# Patient Record
Sex: MALE | Race: WHITE | Employment: FULL TIME | URBAN - METROPOLITAN AREA
[De-identification: names, ages, dates, MRNs, and addresses within clinical notes are randomized per-mention and may not be internally consistent; named-entity substitution may affect disease eponyms.]

---

## 2017-04-10 RX ORDER — LANOLIN ALCOHOL/MO/W.PET/CERES
1000 CREAM (GRAM) TOPICAL DAILY
COMMUNITY
End: 2019-09-23

## 2017-04-10 RX ORDER — BISMUTH SUBSALICYLATE 262 MG
1 TABLET,CHEWABLE ORAL DAILY
COMMUNITY
End: 2019-09-23

## 2017-04-10 RX ORDER — AMLODIPINE BESYLATE 2.5 MG/1
2.5 TABLET ORAL DAILY
COMMUNITY
End: 2019-09-23

## 2017-04-10 NOTE — PERIOP NOTES
[facility]  Ambulatory Surgery Unit  Pre-operative Instructions    Surgery/Procedure Date  04/12/2017            Tentative Arrival Time will call day       1. On the day of your surgery/procedure, please report to the Ambulatory Surgery Unit Registration Desk and sign in at your designated time. The Ambulatory Surgery Unit is located in [facility] on the [facility] side of the [facility] across from the [address] [place] [address]. Please have all of your health insurance cards and a photo ID. 2. You must have someone with you to drive you home, as you should not drive a car for 24 hours following anesthesia. Please make arrangements for a responsible adult friend or family member to stay with you for at least the first 24 hours after your surgery. 3. Do not have anything to eat or drink (including water, gum, mints, coffee, juice) after midnight   04/11/2017  . This may not apply to medications prescribed by your physician. (Please note below the special instructions with medications to take the morning of surgery, if applicable.)    4. We recommend you do not drink any alcoholic beverages for 24 hours before and after your surgery. 5. Stop all Aspirin and non-steroidal anti-inflammatory drugs (i.e. Advil, Aleve) as directed by your surgeon's office. Stop all vitamins and herbal supplements seven days prior to your surgery. If you are currently taking Plavix, Coumadin, or other blood-thinning agents, contact your surgeon for instructions. 6. In an effort to help prevent surgical site infection, we ask that you shower with an anti-bacterial soap (i.e. Dial or Safeguard) for 3 days prior to and on the morning of surgery, using a fresh towel after each shower. Do not apply any lotions, powders or deodorants after the shower on the day of your procedure. If applicable, please do not shave the operative site for 48 hours prior to surgery. 7. Wear comfortable clothes.   Wear glasses instead of contacts. Do not bring any money or jewelry. Do not wear make-up, particularly mascara, the morning of your surgery. Do not wear nail polish, particularly if you are having foot /hand surgery. Wear your hair loose or down, no ponytails, buns, dorothy pins or clips. All body piercings must be removed. 8. You should understand that if you do not follow these instructions your surgery may be cancelled. If your physical condition changes (i.e. fever, cold or flu) please contact your surgeon as soon as possible. 9. It is important that you be on time. If a situation occurs where you may be late, or if you have any questions or problems, please call [phone].    10. Your surgery time may be subject to change. You will receive a phone call the day prior to surgery to confirm your arrival time. 11. Pediatric patients: please bring a change of clothes, diapers, bottle/sippy cup, pacifier, etc.      Special Instructions:    MEDICATIONS TO TAKE THE MORNING OF SURGERY WITH A SIP OF WATER: Norvasc      I understand a pre-operative phone call will be made to verify my surgery time. In the event that I am not available, I give permission for a message to be left on my answering service and/or with another person? yes         ___________________      ___________________  _________  Pt verbalized understanding of preop instructions via telephone.   (Signature of Patient)          (Witness)                              (Date and Time)

## 2017-04-11 ENCOUNTER — ANESTHESIA EVENT (OUTPATIENT)
Dept: SURGERY | Age: 52
End: 2017-04-11
Payer: COMMERCIAL

## 2017-04-12 ENCOUNTER — ANESTHESIA (OUTPATIENT)
Dept: SURGERY | Age: 52
End: 2017-04-12
Payer: COMMERCIAL

## 2017-04-12 ENCOUNTER — HOSPITAL ENCOUNTER (OUTPATIENT)
Age: 52
Setting detail: OUTPATIENT SURGERY
Discharge: HOME | End: 2017-04-12
Attending: OTOLARYNGOLOGY | Admitting: OTOLARYNGOLOGY
Payer: COMMERCIAL

## 2017-04-12 ENCOUNTER — APPOINTMENT (OUTPATIENT)
Dept: GENERAL RADIOLOGY | Age: 52
End: 2017-04-12
Attending: ANESTHESIOLOGY
Payer: COMMERCIAL

## 2017-04-12 VITALS
BODY MASS INDEX: 24.87 KG/M2 | OXYGEN SATURATION: 92 % | RESPIRATION RATE: 11 BRPM | WEIGHT: 200 LBS | TEMPERATURE: 98.3 F | HEIGHT: 75 IN | SYSTOLIC BLOOD PRESSURE: 135 MMHG | HEART RATE: 99 BPM | DIASTOLIC BLOOD PRESSURE: 85 MMHG

## 2017-04-12 PROCEDURE — 77030013079 HC BLNKT BAIR HGGR 3M -A: Performed by: ANESTHESIOLOGY

## 2017-04-12 PROCEDURE — 77030012602 HC SPNG PTTY NEUR J&J -B: Performed by: OTOLARYNGOLOGY

## 2017-04-12 PROCEDURE — 77030006689 HC BLD OPHTH BVR BD -A: Performed by: OTOLARYNGOLOGY

## 2017-04-12 PROCEDURE — 74011000250 HC RX REV CODE- 250: Performed by: OTOLARYNGOLOGY

## 2017-04-12 PROCEDURE — 77030008356 HC SPLNT NSL INT MEDT -B: Performed by: OTOLARYNGOLOGY

## 2017-04-12 PROCEDURE — 76210000050 HC AMBSU PH II REC 0.5 TO 1 HR: Performed by: OTOLARYNGOLOGY

## 2017-04-12 PROCEDURE — 77030002916 HC SUT ETHLN J&J -A: Performed by: OTOLARYNGOLOGY

## 2017-04-12 PROCEDURE — 74011250636 HC RX REV CODE- 250/636

## 2017-04-12 PROCEDURE — 74011250637 HC RX REV CODE- 250/637: Performed by: OTOLARYNGOLOGY

## 2017-04-12 PROCEDURE — 77030014006 HC SPNG HEMSTAT J&J -A: Performed by: OTOLARYNGOLOGY

## 2017-04-12 PROCEDURE — 77030020255 HC SOL INJ LR 1000ML BG: Performed by: OTOLARYNGOLOGY

## 2017-04-12 PROCEDURE — 74011250636 HC RX REV CODE- 250/636: Performed by: OTOLARYNGOLOGY

## 2017-04-12 PROCEDURE — 74011250636 HC RX REV CODE- 250/636: Performed by: ANESTHESIOLOGY

## 2017-04-12 PROCEDURE — 77030002974 HC SUT PLN J&J -A: Performed by: OTOLARYNGOLOGY

## 2017-04-12 PROCEDURE — 76210000036 HC AMBSU PH I REC 1.5 TO 2 HR: Performed by: OTOLARYNGOLOGY

## 2017-04-12 PROCEDURE — 76030000001 HC AMB SURG OR TIME 1 TO 1.5: Performed by: OTOLARYNGOLOGY

## 2017-04-12 PROCEDURE — 77030002888 HC SUT CHRMC J&J -A: Performed by: OTOLARYNGOLOGY

## 2017-04-12 PROCEDURE — 74011000250 HC RX REV CODE- 250

## 2017-04-12 PROCEDURE — 77030026438 HC STYL ET INTUB CARD -A: Performed by: ANESTHESIOLOGY

## 2017-04-12 PROCEDURE — 74011000272 HC RX REV CODE- 272: Performed by: OTOLARYNGOLOGY

## 2017-04-12 PROCEDURE — 74011000250 HC RX REV CODE- 250: Performed by: ANESTHESIOLOGY

## 2017-04-12 PROCEDURE — 77030006907 HC BLD SNUS SHV MEDT -C: Performed by: OTOLARYNGOLOGY

## 2017-04-12 PROCEDURE — 77030018836 HC SOL IRR NACL ICUM -A: Performed by: OTOLARYNGOLOGY

## 2017-04-12 PROCEDURE — 71010 XR CHEST PORT: CPT

## 2017-04-12 PROCEDURE — 77030021668 HC NEB PREFIL KT VYRM -A: Performed by: OTOLARYNGOLOGY

## 2017-04-12 PROCEDURE — 76060000062 HC AMB SURG ANES 1 TO 1.5 HR: Performed by: OTOLARYNGOLOGY

## 2017-04-12 PROCEDURE — 77030008684 HC TU ET CUF COVD -B: Performed by: ANESTHESIOLOGY

## 2017-04-12 RX ORDER — ROCURONIUM BROMIDE 10 MG/ML
INJECTION, SOLUTION INTRAVENOUS AS NEEDED
Status: DISCONTINUED | OUTPATIENT
Start: 2017-04-12 | End: 2017-04-12 | Stop reason: HOSPADM

## 2017-04-12 RX ORDER — SODIUM CHLORIDE, SODIUM LACTATE, POTASSIUM CHLORIDE, CALCIUM CHLORIDE 600; 310; 30; 20 MG/100ML; MG/100ML; MG/100ML; MG/100ML
INJECTION, SOLUTION INTRAVENOUS
Status: DISCONTINUED | OUTPATIENT
Start: 2017-04-12 | End: 2017-04-12 | Stop reason: HOSPADM

## 2017-04-12 RX ORDER — CLINDAMYCIN PHOSPHATE 900 MG/50ML
900 INJECTION INTRAVENOUS ONCE
Status: COMPLETED | OUTPATIENT
Start: 2017-04-12 | End: 2017-04-12

## 2017-04-12 RX ORDER — BACITRACIN ZINC 500 UNIT/G
OINTMENT (GRAM) TOPICAL ONCE
Status: COMPLETED | OUTPATIENT
Start: 2017-04-12 | End: 2017-04-12

## 2017-04-12 RX ORDER — ONDANSETRON 2 MG/ML
INJECTION INTRAMUSCULAR; INTRAVENOUS AS NEEDED
Status: DISCONTINUED | OUTPATIENT
Start: 2017-04-12 | End: 2017-04-12 | Stop reason: HOSPADM

## 2017-04-12 RX ORDER — GLYCOPYRROLATE 0.2 MG/ML
INJECTION INTRAMUSCULAR; INTRAVENOUS AS NEEDED
Status: DISCONTINUED | OUTPATIENT
Start: 2017-04-12 | End: 2017-04-12 | Stop reason: HOSPADM

## 2017-04-12 RX ORDER — FENTANYL CITRATE 50 UG/ML
25 INJECTION, SOLUTION INTRAMUSCULAR; INTRAVENOUS
Status: DISCONTINUED | OUTPATIENT
Start: 2017-04-12 | End: 2017-04-12 | Stop reason: HOSPADM

## 2017-04-12 RX ORDER — DEXAMETHASONE SODIUM PHOSPHATE 4 MG/ML
INJECTION, SOLUTION INTRA-ARTICULAR; INTRALESIONAL; INTRAMUSCULAR; INTRAVENOUS; SOFT TISSUE AS NEEDED
Status: DISCONTINUED | OUTPATIENT
Start: 2017-04-12 | End: 2017-04-12 | Stop reason: HOSPADM

## 2017-04-12 RX ORDER — MORPHINE SULFATE 10 MG/ML
2 INJECTION, SOLUTION INTRAMUSCULAR; INTRAVENOUS
Status: DISCONTINUED | OUTPATIENT
Start: 2017-04-12 | End: 2017-04-12 | Stop reason: HOSPADM

## 2017-04-12 RX ORDER — NEOSTIGMINE METHYLSULFATE 1 MG/ML
INJECTION INTRAVENOUS AS NEEDED
Status: DISCONTINUED | OUTPATIENT
Start: 2017-04-12 | End: 2017-04-12 | Stop reason: HOSPADM

## 2017-04-12 RX ORDER — DEXAMETHASONE SODIUM PHOSPHATE 4 MG/ML
10 INJECTION, SOLUTION INTRA-ARTICULAR; INTRALESIONAL; INTRAMUSCULAR; INTRAVENOUS; SOFT TISSUE EVERY 4 HOURS
Status: DISCONTINUED | OUTPATIENT
Start: 2017-04-12 | End: 2017-04-12 | Stop reason: HOSPADM

## 2017-04-12 RX ORDER — SODIUM CHLORIDE 0.9 % (FLUSH) 0.9 %
5-10 SYRINGE (ML) INJECTION EVERY 8 HOURS
Status: DISCONTINUED | OUTPATIENT
Start: 2017-04-12 | End: 2017-04-12 | Stop reason: HOSPADM

## 2017-04-12 RX ORDER — ALBUTEROL SULFATE 0.83 MG/ML
SOLUTION RESPIRATORY (INHALATION)
Status: COMPLETED
Start: 2017-04-12 | End: 2017-04-12

## 2017-04-12 RX ORDER — SODIUM CHLORIDE 0.9 % (FLUSH) 0.9 %
5-10 SYRINGE (ML) INJECTION AS NEEDED
Status: DISCONTINUED | OUTPATIENT
Start: 2017-04-12 | End: 2017-04-12 | Stop reason: HOSPADM

## 2017-04-12 RX ORDER — SODIUM CHLORIDE, SODIUM LACTATE, POTASSIUM CHLORIDE, CALCIUM CHLORIDE 600; 310; 30; 20 MG/100ML; MG/100ML; MG/100ML; MG/100ML
25 INJECTION, SOLUTION INTRAVENOUS CONTINUOUS
Status: DISCONTINUED | OUTPATIENT
Start: 2017-04-12 | End: 2017-04-12 | Stop reason: HOSPADM

## 2017-04-12 RX ORDER — OXYCODONE AND ACETAMINOPHEN 5; 325 MG/1; MG/1
1 TABLET ORAL ONCE
Status: DISCONTINUED | OUTPATIENT
Start: 2017-04-12 | End: 2017-04-12 | Stop reason: HOSPADM

## 2017-04-12 RX ORDER — HYDROMORPHONE HYDROCHLORIDE 2 MG/ML
INJECTION, SOLUTION INTRAMUSCULAR; INTRAVENOUS; SUBCUTANEOUS AS NEEDED
Status: DISCONTINUED | OUTPATIENT
Start: 2017-04-12 | End: 2017-04-12 | Stop reason: HOSPADM

## 2017-04-12 RX ORDER — DIPHENHYDRAMINE HYDROCHLORIDE 50 MG/ML
12.5 INJECTION, SOLUTION INTRAMUSCULAR; INTRAVENOUS AS NEEDED
Status: DISCONTINUED | OUTPATIENT
Start: 2017-04-12 | End: 2017-04-12 | Stop reason: HOSPADM

## 2017-04-12 RX ORDER — OXYMETAZOLINE HCL 0.05 %
3 SPRAY, NON-AEROSOL (ML) NASAL ONCE
Status: COMPLETED | OUTPATIENT
Start: 2017-04-12 | End: 2017-04-12

## 2017-04-12 RX ORDER — ALBUTEROL SULFATE 0.83 MG/ML
2.5 SOLUTION RESPIRATORY (INHALATION)
Status: COMPLETED | OUTPATIENT
Start: 2017-04-12 | End: 2017-04-12

## 2017-04-12 RX ORDER — PROPOFOL 10 MG/ML
INJECTION, EMULSION INTRAVENOUS AS NEEDED
Status: DISCONTINUED | OUTPATIENT
Start: 2017-04-12 | End: 2017-04-12 | Stop reason: HOSPADM

## 2017-04-12 RX ORDER — HYDROMORPHONE HYDROCHLORIDE 1 MG/ML
.2-.5 INJECTION, SOLUTION INTRAMUSCULAR; INTRAVENOUS; SUBCUTANEOUS ONCE
Status: DISCONTINUED | OUTPATIENT
Start: 2017-04-12 | End: 2017-04-12 | Stop reason: HOSPADM

## 2017-04-12 RX ORDER — OXYMETAZOLINE HCL 0.05 %
2 SPRAY, NON-AEROSOL (ML) NASAL ONCE
Status: COMPLETED | OUTPATIENT
Start: 2017-04-12 | End: 2017-04-12

## 2017-04-12 RX ORDER — MIDAZOLAM HYDROCHLORIDE 1 MG/ML
INJECTION, SOLUTION INTRAMUSCULAR; INTRAVENOUS AS NEEDED
Status: DISCONTINUED | OUTPATIENT
Start: 2017-04-12 | End: 2017-04-12

## 2017-04-12 RX ORDER — LIDOCAINE HYDROCHLORIDE 10 MG/ML
0.1 INJECTION, SOLUTION EPIDURAL; INFILTRATION; INTRACAUDAL; PERINEURAL AS NEEDED
Status: DISCONTINUED | OUTPATIENT
Start: 2017-04-12 | End: 2017-04-12 | Stop reason: HOSPADM

## 2017-04-12 RX ORDER — FENTANYL CITRATE 50 UG/ML
INJECTION, SOLUTION INTRAMUSCULAR; INTRAVENOUS AS NEEDED
Status: DISCONTINUED | OUTPATIENT
Start: 2017-04-12 | End: 2017-04-12 | Stop reason: HOSPADM

## 2017-04-12 RX ORDER — MIDAZOLAM HYDROCHLORIDE 1 MG/ML
INJECTION, SOLUTION INTRAMUSCULAR; INTRAVENOUS AS NEEDED
Status: DISCONTINUED | OUTPATIENT
Start: 2017-04-12 | End: 2017-04-12 | Stop reason: HOSPADM

## 2017-04-12 RX ADMIN — OXYMETAZOLINE HYDROCHLORIDE 3 SPRAY: 5 SPRAY NASAL at 09:43

## 2017-04-12 RX ADMIN — HYDROMORPHONE HYDROCHLORIDE 1 MG: 2 INJECTION, SOLUTION INTRAMUSCULAR; INTRAVENOUS; SUBCUTANEOUS at 11:12

## 2017-04-12 RX ADMIN — FENTANYL CITRATE 100 MCG: 50 INJECTION, SOLUTION INTRAMUSCULAR; INTRAVENOUS at 11:03

## 2017-04-12 RX ADMIN — NEOSTIGMINE METHYLSULFATE 3 MG: 1 INJECTION INTRAVENOUS at 11:59

## 2017-04-12 RX ADMIN — DEXAMETHASONE SODIUM PHOSPHATE 4 MG: 4 INJECTION, SOLUTION INTRA-ARTICULAR; INTRALESIONAL; INTRAMUSCULAR; INTRAVENOUS; SOFT TISSUE at 11:15

## 2017-04-12 RX ADMIN — PROPOFOL 100 MG: 10 INJECTION, EMULSION INTRAVENOUS at 11:35

## 2017-04-12 RX ADMIN — ALBUTEROL SULFATE 2.5 MG: 2.5 SOLUTION RESPIRATORY (INHALATION) at 13:04

## 2017-04-12 RX ADMIN — ONDANSETRON 4 MG: 2 INJECTION INTRAMUSCULAR; INTRAVENOUS at 11:15

## 2017-04-12 RX ADMIN — SODIUM CHLORIDE, SODIUM LACTATE, POTASSIUM CHLORIDE, AND CALCIUM CHLORIDE 25 ML/HR: 600; 310; 30; 20 INJECTION, SOLUTION INTRAVENOUS at 09:39

## 2017-04-12 RX ADMIN — ROCURONIUM BROMIDE 50 MG: 10 INJECTION, SOLUTION INTRAVENOUS at 11:03

## 2017-04-12 RX ADMIN — LIDOCAINE HYDROCHLORIDE 0.1 ML: 10 INJECTION, SOLUTION EPIDURAL; INFILTRATION; INTRACAUDAL; PERINEURAL at 09:39

## 2017-04-12 RX ADMIN — DEXAMETHASONE SODIUM PHOSPHATE 10 MG: 4 INJECTION, SOLUTION INTRAMUSCULAR; INTRAVENOUS at 09:45

## 2017-04-12 RX ADMIN — SODIUM CHLORIDE, SODIUM LACTATE, POTASSIUM CHLORIDE, CALCIUM CHLORIDE: 600; 310; 30; 20 INJECTION, SOLUTION INTRAVENOUS at 11:10

## 2017-04-12 RX ADMIN — GLYCOPYRROLATE 0.6 MG: 0.2 INJECTION INTRAMUSCULAR; INTRAVENOUS at 11:57

## 2017-04-12 RX ADMIN — PROPOFOL 200 MG: 10 INJECTION, EMULSION INTRAVENOUS at 11:03

## 2017-04-12 RX ADMIN — MIDAZOLAM HYDROCHLORIDE 2 MG: 1 INJECTION, SOLUTION INTRAMUSCULAR; INTRAVENOUS at 10:53

## 2017-04-12 RX ADMIN — ALBUTEROL SULFATE 2.5 MG: 0.83 SOLUTION RESPIRATORY (INHALATION) at 13:04

## 2017-04-12 RX ADMIN — HYDROMORPHONE HYDROCHLORIDE 1 MG: 2 INJECTION, SOLUTION INTRAMUSCULAR; INTRAVENOUS; SUBCUTANEOUS at 11:37

## 2017-04-12 RX ADMIN — CLINDAMYCIN PHOSPHATE 900 MG: 18 INJECTION, SOLUTION INTRAVENOUS at 11:10

## 2017-04-12 NOTE — ANESTHESIA POSTPROCEDURE EVALUATION
Post-Anesthesia Evaluation and Assessment    Patient: Zara Miranda MRN: [id number]  SSN: [id number]    YOB: 1965  Age: 45 y.o. Sex: male       Cardiovascular Function/Vital Signs  Visit Vitals    /84    Pulse 93    Temp 36.8 °C (98.3 °F)    Resp 12    Ht 6' 3\" (1.905 m)    Wt 90.7 kg (200 lb)    SpO2 (!) 89%    BMI 25 kg/m2       Patient is status post general anesthesia for Procedure(s):  SEPTOPLASTY, TURBINATE REDUCTION. Nausea/Vomiting: None    Postoperative hydration reviewed and adequate. Pain:  Pain Scale 1: Numeric (0 - 10) (04/12/17 1245)  Pain Intensity 1: 0 (04/12/17 1245)   Managed    Neurological Status:   Neuro (WDL): Exceptions to WDL (04/12/17 1213)  Neuro  Neurologic State: Alert (04/12/17 1217)  LUE Motor Response: Purposeful (04/12/17 1213)  LLE Motor Response: Purposeful (04/12/17 1213)  RUE Motor Response: Purposeful (04/12/17 1213)  RLE Motor Response: Purposeful (04/12/17 1213)   At baseline    Mental Status and Level of Consciousness: Arousable    Pulmonary Status:   O2 Device: 02 face tent (04/12/17 1213)   Adequate oxygenation and airway patent    Complications related to anesthesia: None    Post-anesthesia assessment completed.  No concerns    Signed By: Opal Garcia MD     April 12, 2017

## 2017-04-12 NOTE — DISCHARGE INSTRUCTIONS
[facility], [facility]. Rupesh Jimenez M.D., Ph.D., F.A.C.S. [address], [address] [facility]  [phone]    POST OPERATIVE INSTRUCTIONS-SEPTOPLASTY    The following instructions are designed to help you recover from surgery as easily as possible. Taking care of yourself can prevent complications. It is very important that you read this sheet often and follow the instructions carefully while you are at home. Your doctor or nurse will be happy to answer any questions. 1. DIET:  You may resume your normal diet in 24 hours. We suggest nothing heavy or greasy and avoid dairy products the first 24 hours. It is important to remember that good overall diet and health promotes healing. 2.  ACTIVITY RESTRICTIONS:  The following guidelines should be followed until your doctor tells you otherwise:   Do not blow your nose, sniff and if you have to sneeze or cough-do with mouth open   Do not lift anything over five pounds.  Do not bend over. Avoid doing things like tying your shoes or picking up things off the floor.  Do not do heavy exercise or play contact sports   Do not strain for a bowel movement. If you are constipated, take a stool softener or a gentle laxative.  Go back to work or school in one week. 3.  HOW TO TAKE CARE OF YOUR NOSE AND SINUSES:  Take the antibiotic prescribed by your doctor. Call if you have any problems or questions. We expect bloody drainage from your nose. You are to change the dressing below your nose as needed and expect to do this 10 to 12 times the first day. We want the drainage to come forward and not down your throat. We suggest you rest and sleep elevated on several pillows on your side. You will have less drainage each day and the swelling will reduce in several days. Call the office if you have bloody saturated gauze more than once an hour.     Clean the nasal openings twice daily with a Q-tip soaked in hydrogen peroxide until clear of all crusts. Take the pain medication prescribed by your doctor as needed for discomfort. No Aspirin, Motrin, Alleve, Advil or similar products for 3 weeks. You make take Tylenol (Acetaminophen) when you no longer need prescribed medication. Do not take Tylenol on top of pain medication because Tylenol is in the pain medication. Avoid smoke, dust, fumes or anything else that might irritate the nose. Protect yourself from any unexpected injury to your nose or face, such as being hit by small children or a restless bedmate. You may find that a cool mist room humidifier is helpful in decreasing the amount of dryness in your nose and mouth. After surgery you should use a nasal saline spray such as Ayr or Simply Saline. Use 4 sprays in each nostril every two hours while awake to help prevent crusts from forming in your nose. 4. RETURN APPOINTMENT:   You need to make a follow-up appointment with your doctor in 5 days. At this time your nose will be gently and carefully examined and your packing will be removed. 5. NOTIFY YOUR DOCTOR IF YOU HAVE:    A sudden increase in the amount of bleeding from the nose   A fever above 101 degrees F, which persists despite increasing the amount of fluids you take. A person with a fever should try to drink one cup of water each waking hour.  Persistent sharp pain that is not relieved by the pain medication you receive.  Increased swelling or redness of your nose. If you have any questions or concerns following your surgery do not hesitate to contact our office at [phone]     DO  [facility], [address] [facility] TAKE NARCOTIC PAIN MEDICATIONS WITH FOOD   Narcotics tend to be constipating, we suggest taking a stool softener such as Colace or Miralax (follow package instructions). DO NOT DRIVE WHILE TAKING NARCOTIC PAIN MEDICATIONS.     DO NOT TAKE SLEEPING MEDICATIONS OR ANTIANXIETY MEDICATIONS WHILE TAKING NARCOTIC PAIN MEDICATIONS,  ESPECIALLY THE NIGHT OF ANESTHESIA. CPAP PATIENTS BE SURE TO WEAR MACHINE WHENEVER NAPPING OR SLEEPING. DISCHARGE SUMMARY from Nurse    The following personal items collected during your admission are returned to you:   Dental Appliance: Dental Appliances: None  Vision: Visual Aid: Glasses  Hearing Aid:    Jewelry: Jewelry: None  Clothing: Clothing: Other (comment) (belonging bag)  Other Valuables: Other Valuables: None  Valuables sent to safe:        PATIENT INSTRUCTIONS:    After General Anesthesia or Intravenous Sedation, for 24 hours or while taking prescription Narcotics:        Someone should be with you for the next 24 hours. For your own safety, a responsible adult must drive you home. · Limit your activities  · Recommended activity: Rest today, up with assistance today. Do not climb stairs or shower unattended for the next 24 hours. · Please start with a soft bland diet and advance as tolerated (no nausea) to regular diet. · If you have a sore throat you should try the following: fluids, warm salt water gargles, or throat lozenges. If it does not improve after several days please follow up with your primary physician. · Do not drive and operate hazardous machinery  · Do not make important personal or business decisions  · Do  not drink alcoholic beverages  · If you have not urinated within 8 hours after discharge, please contact your surgeon on call.     Report the following to your surgeon:  · Excessive pain, swelling, redness or odor of or around the surgical area  · Temperature over 100.5  · Nausea and vomiting lasting longer than 4 hours or if unable to take medications  · Any signs of decreased circulation or nerve impairment to extremity: change in color, persistent  numbness, tingling, coldness or increase pain      · You will receive a Post Operative Call from one of the Recovery Room Nurses on the day after your surgery to check on you. It is very important for us to know how you are recovering after your surgery. If you have an issue or need to speak with someone, please call your surgeon, do not wait for the post operative call. · You may receive an e-mail or letter in the mail from [facility] regarding your experience with us in the Ambulatory Surgery Unit. Your feedback is valuable to us and we appreciate your participation in the survey. · If the above instructions are not adequate, please contact Mavis Charlton RN, [facility] anesthesia Nurse Manager or our Anesthesiologist, at [phone]. If you are having problems after your surgery, call the physician at his office number. · We wish you a speedy recovery ? What to do at Home:      *  Please give a list of your current medications to your Primary Care Provider. *  Please update this list whenever your medications are discontinued, doses are      changed, or new medications (including over-the-counter products) are added. *  Please carry medication information at all times in case of emergency situations. These are general instructions for a healthy lifestyle:    No smoking/ No tobacco products/ Avoid exposure to second hand smoke    Surgeon General's Warning:  Quitting smoking now greatly reduces serious risk to your health. Obesity, smoking, and sedentary lifestyle greatly increases your risk for illness    A healthy diet, regular physical exercise & weight monitoring are important for maintaining a healthy lifestyle    You may be retaining fluid if you have a history of heart failure or if you experience any of the following symptoms:  Weight gain of 3 pounds or more overnight or 5 pounds in a week, increased swelling in our hands or feet or shortness of breath while lying flat in bed.   Please call your doctor as soon as you notice any of these symptoms; do not wait until your next office visit. Recognize signs and symptoms of STROKE:    F-face looks uneven    A-arms unable to move or move even    S-speech slurred or non-existent    T-time-call 911 as soon as signs and symptoms begin-DO NOT go       Back to bed or wait to see if you get better-TIME IS BRAIN. If you have not received your influenza and/or pneumococcal vaccine, please follow up with your primary care physician. The discharge information has been reviewed with the patient and caregiver. The patient and caregiver verbalized understanding.

## 2017-04-12 NOTE — OP NOTES
[address], [address]   OP NOTE       Name:  Deena Mcclendon   MR#:  [id number]   :  1965   Account #:  [de-identified]    Surgery Date:  2017   Date of Adm:  2017       PREOPERATIVE DIAGNOSES:   1. Nasal septal deviation. 2. Turbinate hypertrophy. POSTOPERATIVE DIAGNOSES:   1. Nasal septal deviation. 2. Turbinate hypertrophy. PROCEDURES PERFORMED:   1. Septoplasty. 2. Bilateral inferior turbinate submucosal resection with therapeutic   outfracture. SURGEON: Linda Azar. Yvonne Browning MD    ANESTHESIA: General endotracheal tube anesthesia. ESTIMATED BLOOD LOSS:  35 mL. COMPLICATIONS: No surgical complications observed. SPECIMENS REMOVED:  None. INDICATIONS: The patient is a 58-year-old male with a long history of   chronic nasal airway obstruction which has been refractory to medical   therapy. A preoperative nasal endoscopy confirmed the presence of a   markedly deviated and obstructive septal deflection with hypertrophy of   the inferior turbinates. Preoperatively, the alternatives, potential   benefits, and possible risks of the procedure were explained to the   patient, who understood and requested to proceed. DESCRIPTION OF PROCEDURE: The patient was brought to the   operating room, placed supine on the operating table, and following the   smooth induction of general endotracheal tube anesthesia, the table   was turned 90 degrees and the patient was positioned for operation. Xylocaine 1% with 1:100,000 epinephrine was injected in a   submucoperichondrial and submucoperiosteal fashion from anterior to   posterior. Further oxymetazoline on neurosurgical cottonoids was   placed intranasally. A routine Betadine prep and drape was completed. A [address] [place] blade was used to initiate a left hemitransfixion incision.    The submucoperichondrial and submucoperiosteal elevation of   membranes was carried out from anterior to posterior. The   osteocartilaginous junction was identified and  with a caudal   calibrated elevator. The deformed and obstructing portions of posterior   osseous septum were removed with a Justo-Barker bone punch. A spur arising from the maxillary crest was removed with a 4 mm   osteotome. A 2 mm strip of anterosuperior quadrangular cartilage was   removed to allow a swinging door return to midline of the planar   portions of quadrangular cartilage. Satisfactory reduction of the nasal   septal constituents was observed. Attention was turned to turbinate   reduction. Using the StraightShot handpiece with a 2 mm turbinate blade   attached, a submucosal resection of the right and left inferior turbinate   soft tissues was carried out. After adequate soft tissue had been   resected, a Boies elevator was used to perform a therapeutic   outfracture of the inferior turbinate on each side. A satisfactory nasal   airway was subsequently observed and attention was turned to   closure. Interrupted 4-0 chromic was used to approximate the margins of the   left hemitransfixion incision. A running 4-0 plain gut suture was used to   approximate septal membranes to the midline. Douglass ventilated splints   were placed and secured with a nylon mattress suture. Bacitracin-  coated Gelfoam was placed on each side of the nasal cavity to further   support the inferior turbinates in a lateralized position. With application   of a sterile nasal dressing, the patient's procedure was concluded. The   patient was aroused from general anesthesia, extubated in the   operating room, and transferred to the recovery area in satisfactory   condition.         Bianka Sims MD      [address] [place] [address] / GRIC[place]   D:  04/12/2017   12:13   T:  04/12/2017   12:50   Job #:  [id number]

## 2017-04-12 NOTE — BRIEF OP NOTE
BRIEF OPERATIVE NOTE    Date of Procedure: 4/12/2017   Preoperative Diagnosis: DEVIATED SEPTUM, TURBINATE HYPERTROPHY  Postoperative Diagnosis: DEVIATED SEPTUM, TURBINATE HYPERTROPHY    Procedure(s):  SEPTOPLASTY, TURBINATE REDUCTION  Surgeon(s) and Role:     * Gonzalo Tompkins MD - Primary            Surgical Staff:  Circ-1: Gerald Cole  Circ-Relief: Miguel Regalado RN  Scrub Tech-1: Fern Michael  Scrub Tech-Relief: Jamaal Sierra  Event Time In   Incision Start 1121   Incision Close 1200     Anesthesia: General   Estimated Blood Loss: 25 ml  Specimens: * No specimens in log *   Findings: as expected   Complications: no surgical complications  Implants: * No implants in log *

## 2017-04-12 NOTE — ANESTHESIA PREPROCEDURE EVALUATION
Anesthetic History   No history of anesthetic complications            Review of Systems / Medical History  Patient summary reviewed, nursing notes reviewed and pertinent labs reviewed    Pulmonary          Smoker (1/2 ppd, none x 1 week)      Comments: Smoker  Deviated Septum  Turbinate Hypertrophy  Pulmonary Sarcoidosis   Neuro/Psych   Within defined limits           Cardiovascular    Hypertension              Exercise tolerance: >4 METS     GI/Hepatic/Renal  Within defined limits              Endo/Other             Other Findings   Comments: Gout  Alcohol - 1/2 gallon bourbon per week    sacoidosis of the lungs; asymptomatic           Physical Exam    Airway  Mallampati: II  TM Distance: 4 - 6 cm  Neck ROM: normal range of motion   Mouth opening: Normal     Cardiovascular  Regular rate and rhythm,  S1 and S2 normal,  no murmur, click, rub, or gallop  Rhythm: regular  Rate: normal      Pertinent negatives: No murmur   Dental      Comments: Cracked upper left molar   Pulmonary  Breath sounds clear to auscultation               Abdominal  GI exam deferred       Other Findings            Anesthetic Plan    ASA: 2  Anesthesia type: general    Monitoring Plan: BIS      Induction: Intravenous  Anesthetic plan and risks discussed with: Patient

## 2017-04-12 NOTE — PERIOP NOTES
June Garcia  1965  [id number]    Situation:  Verbal report given from: LARRY Macdonald RN and JOELLEN [place] CRNA  Procedure: Procedure(s):  SEPTOPLASTY, TURBINATE REDUCTION    Background:    Preoperative diagnosis: DEVIATED SEPTUM, TURBINATE HYPERTROPHY    Postoperative diagnosis: DEVIATED SEPTUM, TURBINATE HYPERTROPHY    :  Dr. Zach Eden    Assistant(s): Circ-1: Haylee Liu  Circ-Relief: Paige Howell RN  Scrub Tech-1: Fern Michael  Scrub Tech-Relief: Apryl Marlow    Specimens: * No specimens in log *    Assessment:  Intra-procedure medications         Anesthesia gave intra-procedure sedation and medications, see anesthesia flow sheet     Intravenous fluids: LR@ KVO     Vital signs stable       Recommendation:    Permission to share finding with family or friend yes    [phone] Pt continues with humidified face shield, encoured cough/deep breathing. Able to bring sats up to 95-96 % but unable to sustain, sats range from 88-91%  1304 Albuterol nebulizer treatment given per Dr. Jerry Hidalgo order. Pt using incentive spirometer prior to this and o2 sats still remaining high 80's, low 90's. 1315  to bedside, ordered stat CXR.  notified of pt's oxygen sat level and that CXR ordered. Report given to KARIME Pulliam RN to assume care of pt. Pt alert/awake, denies discomfort/nausea. 1740 [place] done

## 2017-04-12 NOTE — PERIOP NOTES
18  Dr. Trey Dinero updated by JACQUELINE Ladd,RN about low oxygen saturation and plan for cxr per Dr. Feliciano Ngo. Dr. Trey Dinero request to be notified about cxr results. [id number] CXR done. On 100% face mask. Oxygen saturation 98%. Placed on blow by at 70%    1416 CXR results back. Results shown to Dr. Feliciano Ngo and called to Dr. Trey Dinero. Oxygen saturation low at 92%,. Lungs remain clear. Dr. Trey Dinero and Dr. Feliciano Nog states ok for pt to be d/charley home.

## 2017-04-12 NOTE — IP AVS SNAPSHOT
[address] [facility] 
[phone] Patient: Haydee Carranza MRN: [id number] :1965 You are allergic to the following Allergen Reactions Contrast Agent (Iodine) Itching Penicillin G Hives Recent Documentation Height Weight BMI Smoking Status 1.905 m 90.7 kg 25 kg/m2 Light Tobacco Smoker Emergency Contacts Name Discharge Info Relation Home Work Mobile [address] [facility] CAREGIVER [3] Other Relative [6] [phone] Edrie Jamiljenn     [phone] About your hospitalization You were admitted on:  2017 You last received care in the:  [facility] PACU You were discharged on:  2017 Unit phone number:  [phone] Why you were hospitalized Your primary diagnosis was:  Not on File Providers Seen During Your Hospitalizations Provider Role Specialty Primary office phone Rigo Marie MD Attending Provider Otolaryngology [phone] Your Primary Care Physician (PCP) Primary Care Physician Office Phone Office Fax Arely Glory [phone] Follow-up Information Follow up With Details Comments Contact Info MD Sonal Small [address] [facility] 
[phone] Current Discharge Medication List  
  
CONTINUE these medications which have NOT CHANGED Dose & Instructions Dispensing Information Comments Morning Noon Evening Bedtime  
 cyanocobalamin 1,000 mcg tablet Your last dose was: Your next dose is:    
   
   
 Dose:  1000 mcg Take 1,000 mcg by mouth daily. Indications: PREVENTION OF VITAMIN B12 DEFICIENCY Refills:  0  
     
   
   
   
  
 multivitamin tablet Commonly known as:  ONE A DAY Your last dose was: Your next dose is:    
   
   
 Dose:  1 Tab Take 1 Tab by mouth daily. Indications: VITAMIN DEFICIENCY PREVENTION Refills:  0 NORVASC 2.5 mg tablet Generic drug:  amLODIPine Your last dose was: Your next dose is:    
   
   
 Dose:  2.5 mg Take 2.5 mg by mouth daily. Indications: hypertension Refills:  0 Discharge Instructions [facility]. Lito Hernández M.D., Ph.D., F.A.C.S. Otolaryngology 95  [facility] [address], [address] [facility] 
[phone] POST OPERATIVE INSTRUCTIONS-SEPTOPLASTY The following instructions are designed to help you recover from surgery as easily as possible. Taking care of yourself can prevent complications. It is very important that you read this sheet often and follow the instructions carefully while you are at home. Your doctor or nurse will be happy to answer any questions. 1. DIET: 
You may resume your normal diet in 24 hours. We suggest nothing heavy or greasy and avoid dairy products the first 24 hours. It is important to remember that good overall diet and health promotes healing. 2.  ACTIVITY RESTRICTIONS: 
The following guidelines should be followed until your doctor tells you otherwise: ? Do not blow your nose, sniff and if you have to sneeze or cough-do with mouth open ? Do not lift anything over five pounds. ? Do not bend over. Avoid doing things like tying your shoes or picking up things off the floor. ? Do not do heavy exercise or play contact sports ? Do not strain for a bowel movement. If you are constipated, take a stool softener or a gentle laxative. ? Go back to work or school in one week. 3.  HOW TO TAKE CARE OF YOUR NOSE AND SINUSES: 
Take the antibiotic prescribed by your doctor. Call if you have any problems or questions. We expect bloody drainage from your nose.   You are to change the dressing below your nose as needed and expect to do this 10 to 12 times the first day. We want the drainage to come forward and not down your throat. We suggest you rest and sleep elevated on several pillows on your side. You will have less drainage each day and the swelling will reduce in several days. Call the office if you have bloody saturated gauze more than once an hour. Clean the nasal openings twice daily with a Q-tip soaked in hydrogen peroxide until clear of all crusts. Take the pain medication prescribed by your doctor as needed for discomfort. No Aspirin, Motrin, Alleve, Advil or similar products for 3 weeks. You make take Tylenol (Acetaminophen) when you no longer need prescribed medication. Do not take Tylenol on top of pain medication because Tylenol is in the pain medication. Avoid smoke, dust, fumes or anything else that might irritate the nose. Protect yourself from any unexpected injury to your nose or face, such as being hit by small children or a restless bedmate. You may find that a cool mist room humidifier is helpful in decreasing the amount of dryness in your nose and mouth. After surgery you should use a nasal saline spray such as Ayr or Simply Saline. Use 4 sprays in each nostril every two hours while awake to help prevent crusts from forming in your nose. 4. RETURN APPOINTMENT:  
You need to make a follow-up appointment with your doctor in 5 days. At this time your nose will be gently and carefully examined and your packing will be removed. 5. NOTIFY YOUR DOCTOR IF YOU HAVE:  
? A sudden increase in the amount of bleeding from the nose ? A fever above 101 degrees F, which persists despite increasing the amount of fluids you take. A person with a fever should try to drink one cup of water each waking hour. ? Persistent sharp pain that is not relieved by the pain medication you receive. ? Increased swelling or redness of your nose. If you have any questions or concerns following your surgery do not hesitate to contact our office at [phone] DO NOT TAKE TYLENOL/ACETAMINOPHEN WITH PERCOCET, LORTAB, [address] TAKE NARCOTIC PAIN MEDICATIONS WITH FOOD Narcotics tend to be constipating, we suggest taking a stool softener such as Colace or Miralax (follow package instructions). DO NOT DRIVE WHILE TAKING NARCOTIC PAIN MEDICATIONS. DO NOT TAKE SLEEPING MEDICATIONS OR ANTIANXIETY MEDICATIONS WHILE TAKING NARCOTIC PAIN MEDICATIONS,  ESPECIALLY THE NIGHT OF ANESTHESIA. CPAP PATIENTS BE SURE TO WEAR MACHINE WHENEVER NAPPING OR SLEEPING. DISCHARGE SUMMARY from Nurse The following personal items collected during your admission are returned to you:  
Dental Appliance: Dental Appliances: None Vision: Visual Aid: Glasses Hearing Aid:   
Jewelry: Jewelry: None Clothing: Clothing: Other (comment) (belonging bag) Other Valuables: Other Valuables: None Valuables sent to safe:   
 
 
PATIENT INSTRUCTIONS: 
 
 
F-face looks uneven A-arms unable to move or move even S-speech slurred or non-existent T-time-call 911 as soon as signs and symptoms begin-DO NOT go Back to bed or wait to see if you get better-TIME IS BRAIN. If you have not received your influenza and/or pneumococcal vaccine, please follow up with your primary care physician. The discharge information has been reviewed with the patient and caregiver. The patient and caregiver verbalized understanding. Discharge Orders None Introducing [facility]! [facility] introduces [employer] patient portal. Now you can access parts of your medical record, email your doctor's office, and request medication refills online. 1. In your internet browser, go to [email or web address] 2. Click on the First Time User? Click Here link in the Sign In box. You will see the New Member Sign Up page. 3. Enter your [employer] Access Code exactly as it appears below. You will not need to use this code after youve completed the sign-up process. If you do not sign up before the expiration date, you must request a new code. · [employer] Access Code: [email or web address] Expires: 6/25/2017  2:04 PM 
 
4. Enter the last four digits of your Social Security Number (xxxx) and Date of Birth (mm/dd/yyyy) as indicated and click Submit. You will be taken to the next sign-up page. 5. Create a Unified ID. This will be your Unified login ID and cannot be changed, so think of one that is secure and easy to remember. 6. Create a Unified password. You can change your password at any time. 7. Enter your Password Reset Question and Answer. This can be used at a later time if you forget your password. 8. Enter your e-mail address. You will receive e-mail notification when new information is available in [address] 9. Click Sign Up. You can now view and download portions of your medical record. 10. Click the Download Summary menu link to download a portable copy of your medical information. If you have questions, please visit the Frequently Asked Questions section of the Unified website. Remember, Unified is NOT to be used for urgent needs. For medical emergencies, dial 911. Now available from your iPhone and Android! General Information Please provide this summary of care documentation to your next provider. Patient Signature:  ____________________________________________________________ Date:  ____________________________________________________________  
  
Anna Tatum Provider Signature:  ____________________________________________________________ Date:  ____________________________________________________________

## 2019-08-29 ENCOUNTER — HOSPITAL ENCOUNTER (OUTPATIENT)
Dept: CT IMAGING | Age: 54
Discharge: HOME | End: 2019-08-29
Attending: OTOLARYNGOLOGY
Payer: COMMERCIAL

## 2019-08-29 DIAGNOSIS — J32.9 CHRONIC SINUSITIS: ICD-10-CM

## 2019-08-29 PROCEDURE — 70486 CT MAXILLOFACIAL W/O DYE: CPT

## 2019-09-26 ENCOUNTER — ANESTHESIA EVENT (OUTPATIENT)
Dept: SURGERY | Age: 54
End: 2019-09-26
Payer: COMMERCIAL

## 2019-09-27 ENCOUNTER — HOSPITAL ENCOUNTER (OUTPATIENT)
Age: 54
Setting detail: OUTPATIENT SURGERY
Discharge: HOME | End: 2019-09-27
Attending: OTOLARYNGOLOGY | Admitting: OTOLARYNGOLOGY
Payer: COMMERCIAL

## 2019-09-27 ENCOUNTER — ANESTHESIA (OUTPATIENT)
Dept: SURGERY | Age: 54
End: 2019-09-27
Payer: COMMERCIAL

## 2019-09-27 VITALS
OXYGEN SATURATION: 95 % | RESPIRATION RATE: 17 BRPM | HEIGHT: 75 IN | SYSTOLIC BLOOD PRESSURE: 143 MMHG | BODY MASS INDEX: 23.5 KG/M2 | WEIGHT: 189 LBS | HEART RATE: 84 BPM | DIASTOLIC BLOOD PRESSURE: 95 MMHG | TEMPERATURE: 98.6 F

## 2019-09-27 PROCEDURE — 77030036727 HC DEV INFL BLN SNUS SE DISP ACCL -B: Performed by: OTOLARYNGOLOGY

## 2019-09-27 PROCEDURE — 76030000019 HC AMB SURG 1 TO 1.5 HR INTENSV-TIER 1: Performed by: OTOLARYNGOLOGY

## 2019-09-27 PROCEDURE — 77030021352 HC CBL LD SYS DISP COVD -B: Performed by: OTOLARYNGOLOGY

## 2019-09-27 PROCEDURE — 74011250636 HC RX REV CODE- 250/636: Performed by: ANESTHESIOLOGY

## 2019-09-27 PROCEDURE — 77030003666 HC NDL SPINAL BD -A: Performed by: OTOLARYNGOLOGY

## 2019-09-27 PROCEDURE — 77030020255 HC SOL INJ LR 1000ML BG: Performed by: OTOLARYNGOLOGY

## 2019-09-27 PROCEDURE — 76210000040 HC AMBSU PH I REC FIRST 0.5 HR: Performed by: OTOLARYNGOLOGY

## 2019-09-27 PROCEDURE — 88305 TISSUE EXAM BY PATHOLOGIST: CPT

## 2019-09-27 PROCEDURE — 74011250636 HC RX REV CODE- 250/636: Performed by: NURSE ANESTHETIST, CERTIFIED REGISTERED

## 2019-09-27 PROCEDURE — 74011000250 HC RX REV CODE- 250: Performed by: OTOLARYNGOLOGY

## 2019-09-27 PROCEDURE — 74011250636 HC RX REV CODE- 250/636: Performed by: OTOLARYNGOLOGY

## 2019-09-27 PROCEDURE — C1887 CATHETER, GUIDING: HCPCS | Performed by: OTOLARYNGOLOGY

## 2019-09-27 PROCEDURE — 77030008684 HC TU ET CUF COVD -B: Performed by: NURSE ANESTHETIST, CERTIFIED REGISTERED

## 2019-09-27 PROCEDURE — 76210000057 HC AMBSU PH II REC 1 TO 1.5 HR: Performed by: OTOLARYNGOLOGY

## 2019-09-27 PROCEDURE — 77030006908 HC BLD SNUS SHV MEDT -D: Performed by: OTOLARYNGOLOGY

## 2019-09-27 PROCEDURE — 74011000250 HC RX REV CODE- 250: Performed by: NURSE ANESTHETIST, CERTIFIED REGISTERED

## 2019-09-27 PROCEDURE — 74011250637 HC RX REV CODE- 250/637

## 2019-09-27 PROCEDURE — 88304 TISSUE EXAM BY PATHOLOGIST: CPT

## 2019-09-27 PROCEDURE — 77030034511 HC PK NSL XEROGEL DISOLV ENTM -C: Performed by: OTOLARYNGOLOGY

## 2019-09-27 PROCEDURE — 76060000062 HC AMB SURG ANES 1 TO 1.5 HR: Performed by: OTOLARYNGOLOGY

## 2019-09-27 PROCEDURE — 77030018836 HC SOL IRR NACL ICUM -A: Performed by: OTOLARYNGOLOGY

## 2019-09-27 PROCEDURE — 74011000250 HC RX REV CODE- 250: Performed by: ANESTHESIOLOGY

## 2019-09-27 PROCEDURE — 77030020256 HC SOL INJ NACL 0.9%  500ML: Performed by: OTOLARYNGOLOGY

## 2019-09-27 PROCEDURE — 74011250637 HC RX REV CODE- 250/637: Performed by: OTOLARYNGOLOGY

## 2019-09-27 PROCEDURE — 77030041022 HC TRKR INSTR ENT NAV MEDT -C: Performed by: OTOLARYNGOLOGY

## 2019-09-27 RX ORDER — OXYMETAZOLINE HCL 0.05 %
2 SPRAY, NON-AEROSOL (ML) NASAL ONCE
Status: COMPLETED | OUTPATIENT
Start: 2019-09-27 | End: 2019-09-27

## 2019-09-27 RX ORDER — SODIUM CHLORIDE 0.9 % (FLUSH) 0.9 %
5-40 SYRINGE (ML) INJECTION EVERY 8 HOURS
Status: DISCONTINUED | OUTPATIENT
Start: 2019-09-27 | End: 2019-09-27 | Stop reason: HOSPADM

## 2019-09-27 RX ORDER — ROCURONIUM BROMIDE 10 MG/ML
INJECTION, SOLUTION INTRAVENOUS AS NEEDED
Status: DISCONTINUED | OUTPATIENT
Start: 2019-09-27 | End: 2019-09-27 | Stop reason: HOSPADM

## 2019-09-27 RX ORDER — ONDANSETRON 2 MG/ML
INJECTION INTRAMUSCULAR; INTRAVENOUS AS NEEDED
Status: DISCONTINUED | OUTPATIENT
Start: 2019-09-27 | End: 2019-09-27 | Stop reason: HOSPADM

## 2019-09-27 RX ORDER — LABETALOL HYDROCHLORIDE 5 MG/ML
INJECTION, SOLUTION INTRAVENOUS
Status: DISCONTINUED
Start: 2019-09-27 | End: 2019-09-27 | Stop reason: HOSPADM

## 2019-09-27 RX ORDER — DEXAMETHASONE SODIUM PHOSPHATE 4 MG/ML
INJECTION, SOLUTION INTRA-ARTICULAR; INTRALESIONAL; INTRAMUSCULAR; INTRAVENOUS; SOFT TISSUE AS NEEDED
Status: DISCONTINUED | OUTPATIENT
Start: 2019-09-27 | End: 2019-09-27 | Stop reason: HOSPADM

## 2019-09-27 RX ORDER — MORPHINE SULFATE 10 MG/ML
2 INJECTION, SOLUTION INTRAMUSCULAR; INTRAVENOUS
Status: DISCONTINUED | OUTPATIENT
Start: 2019-09-27 | End: 2019-09-27 | Stop reason: HOSPADM

## 2019-09-27 RX ORDER — DEXAMETHASONE SODIUM PHOSPHATE 4 MG/ML
10 INJECTION, SOLUTION INTRA-ARTICULAR; INTRALESIONAL; INTRAMUSCULAR; INTRAVENOUS; SOFT TISSUE ONCE
Status: COMPLETED | OUTPATIENT
Start: 2019-09-27 | End: 2019-09-27

## 2019-09-27 RX ORDER — GLYCOPYRROLATE 0.2 MG/ML
INJECTION INTRAMUSCULAR; INTRAVENOUS AS NEEDED
Status: DISCONTINUED | OUTPATIENT
Start: 2019-09-27 | End: 2019-09-27 | Stop reason: HOSPADM

## 2019-09-27 RX ORDER — LABETALOL HCL 20 MG/4 ML
10 SYRINGE (ML) INTRAVENOUS ONCE
Status: COMPLETED | OUTPATIENT
Start: 2019-09-27 | End: 2019-09-27

## 2019-09-27 RX ORDER — LACTULOSE 10 G/15ML
10 SOLUTION ORAL; RECTAL DAILY
COMMUNITY
End: 2022-01-01

## 2019-09-27 RX ORDER — PROPOFOL 10 MG/ML
INJECTION, EMULSION INTRAVENOUS AS NEEDED
Status: DISCONTINUED | OUTPATIENT
Start: 2019-09-27 | End: 2019-09-27 | Stop reason: HOSPADM

## 2019-09-27 RX ORDER — CLINDAMYCIN PHOSPHATE 900 MG/50ML
900 INJECTION INTRAVENOUS ONCE
Status: COMPLETED | OUTPATIENT
Start: 2019-09-27 | End: 2019-09-27

## 2019-09-27 RX ORDER — FENTANYL CITRATE 50 UG/ML
INJECTION, SOLUTION INTRAMUSCULAR; INTRAVENOUS AS NEEDED
Status: DISCONTINUED | OUTPATIENT
Start: 2019-09-27 | End: 2019-09-27 | Stop reason: HOSPADM

## 2019-09-27 RX ORDER — SODIUM CHLORIDE 0.9 % (FLUSH) 0.9 %
5-40 SYRINGE (ML) INJECTION AS NEEDED
Status: DISCONTINUED | OUTPATIENT
Start: 2019-09-27 | End: 2019-09-27 | Stop reason: HOSPADM

## 2019-09-27 RX ORDER — OXYCODONE AND ACETAMINOPHEN 5; 325 MG/1; MG/1
1 TABLET ORAL
Status: DISCONTINUED | OUTPATIENT
Start: 2019-09-27 | End: 2019-09-27 | Stop reason: HOSPADM

## 2019-09-27 RX ORDER — FENTANYL CITRATE 50 UG/ML
25 INJECTION, SOLUTION INTRAMUSCULAR; INTRAVENOUS
Status: DISCONTINUED | OUTPATIENT
Start: 2019-09-27 | End: 2019-09-27 | Stop reason: HOSPADM

## 2019-09-27 RX ORDER — HYDROMORPHONE HYDROCHLORIDE 1 MG/ML
.2-.5 INJECTION, SOLUTION INTRAMUSCULAR; INTRAVENOUS; SUBCUTANEOUS ONCE
Status: DISCONTINUED | OUTPATIENT
Start: 2019-09-27 | End: 2019-09-27 | Stop reason: HOSPADM

## 2019-09-27 RX ORDER — OXYMETAZOLINE HCL 0.05 %
SPRAY, NON-AEROSOL (ML) NASAL
Status: COMPLETED
Start: 2019-09-27 | End: 2019-09-27

## 2019-09-27 RX ORDER — HYDROMORPHONE HYDROCHLORIDE 2 MG/ML
INJECTION, SOLUTION INTRAMUSCULAR; INTRAVENOUS; SUBCUTANEOUS AS NEEDED
Status: DISCONTINUED | OUTPATIENT
Start: 2019-09-27 | End: 2019-09-27 | Stop reason: HOSPADM

## 2019-09-27 RX ORDER — MIDAZOLAM HYDROCHLORIDE 1 MG/ML
INJECTION, SOLUTION INTRAMUSCULAR; INTRAVENOUS AS NEEDED
Status: DISCONTINUED | OUTPATIENT
Start: 2019-09-27 | End: 2019-09-27 | Stop reason: HOSPADM

## 2019-09-27 RX ORDER — BACITRACIN ZINC 500 UNIT/G
OINTMENT (GRAM) TOPICAL ONCE
Status: COMPLETED | OUTPATIENT
Start: 2019-09-27 | End: 2019-09-27

## 2019-09-27 RX ORDER — OXYMETAZOLINE HCL 0.05 %
3 SPRAY, NON-AEROSOL (ML) NASAL ONCE
Status: COMPLETED | OUTPATIENT
Start: 2019-09-27 | End: 2019-09-27

## 2019-09-27 RX ORDER — LIDOCAINE HYDROCHLORIDE 20 MG/ML
INJECTION, SOLUTION EPIDURAL; INFILTRATION; INTRACAUDAL; PERINEURAL AS NEEDED
Status: DISCONTINUED | OUTPATIENT
Start: 2019-09-27 | End: 2019-09-27 | Stop reason: HOSPADM

## 2019-09-27 RX ORDER — SODIUM CHLORIDE, SODIUM LACTATE, POTASSIUM CHLORIDE, CALCIUM CHLORIDE 600; 310; 30; 20 MG/100ML; MG/100ML; MG/100ML; MG/100ML
25 INJECTION, SOLUTION INTRAVENOUS CONTINUOUS
Status: DISCONTINUED | OUTPATIENT
Start: 2019-09-27 | End: 2019-09-27 | Stop reason: HOSPADM

## 2019-09-27 RX ORDER — TRIAMCINOLONE ACETONIDE 1 MG/G
CREAM TOPICAL ONCE
Status: DISCONTINUED | OUTPATIENT
Start: 2019-09-27 | End: 2019-09-27 | Stop reason: HOSPADM

## 2019-09-27 RX ORDER — DIPHENHYDRAMINE HYDROCHLORIDE 50 MG/ML
12.5 INJECTION, SOLUTION INTRAMUSCULAR; INTRAVENOUS AS NEEDED
Status: DISCONTINUED | OUTPATIENT
Start: 2019-09-27 | End: 2019-09-27 | Stop reason: HOSPADM

## 2019-09-27 RX ORDER — LIDOCAINE HYDROCHLORIDE 10 MG/ML
0.1 INJECTION, SOLUTION EPIDURAL; INFILTRATION; INTRACAUDAL; PERINEURAL AS NEEDED
Status: DISCONTINUED | OUTPATIENT
Start: 2019-09-27 | End: 2019-09-27 | Stop reason: HOSPADM

## 2019-09-27 RX ORDER — LIDOCAINE HYDROCHLORIDE AND EPINEPHRINE 10; 10 MG/ML; UG/ML
1.5 INJECTION, SOLUTION INFILTRATION; PERINEURAL ONCE
Status: COMPLETED | OUTPATIENT
Start: 2019-09-27 | End: 2019-09-27

## 2019-09-27 RX ORDER — NEOSTIGMINE METHYLSULFATE 1 MG/ML
INJECTION, SOLUTION INTRAVENOUS AS NEEDED
Status: DISCONTINUED | OUTPATIENT
Start: 2019-09-27 | End: 2019-09-27 | Stop reason: HOSPADM

## 2019-09-27 RX ORDER — GENTAMICIN SULFATE 1 MG/G
CREAM TOPICAL ONCE
Status: DISCONTINUED | OUTPATIENT
Start: 2019-09-27 | End: 2019-09-27 | Stop reason: HOSPADM

## 2019-09-27 RX ADMIN — DEXAMETHASONE SODIUM PHOSPHATE 10 MG: 4 INJECTION, SOLUTION INTRAMUSCULAR; INTRAVENOUS at 08:51

## 2019-09-27 RX ADMIN — HYDROMORPHONE HYDROCHLORIDE 0.5 MG: 2 INJECTION, SOLUTION INTRAMUSCULAR; INTRAVENOUS; SUBCUTANEOUS at 10:28

## 2019-09-27 RX ADMIN — ROCURONIUM BROMIDE 40 MG: 10 INJECTION INTRAVENOUS at 10:03

## 2019-09-27 RX ADMIN — HYDROMORPHONE HYDROCHLORIDE 0.5 MG: 2 INJECTION, SOLUTION INTRAMUSCULAR; INTRAVENOUS; SUBCUTANEOUS at 10:42

## 2019-09-27 RX ADMIN — SODIUM CHLORIDE, SODIUM LACTATE, POTASSIUM CHLORIDE, AND CALCIUM CHLORIDE 25 ML/HR: 600; 310; 30; 20 INJECTION, SOLUTION INTRAVENOUS at 08:51

## 2019-09-27 RX ADMIN — Medication 3 MG: at 11:06

## 2019-09-27 RX ADMIN — PROPOFOL 200 MG: 10 INJECTION, EMULSION INTRAVENOUS at 10:03

## 2019-09-27 RX ADMIN — Medication 3 SPRAY: at 09:00

## 2019-09-27 RX ADMIN — LIDOCAINE HYDROCHLORIDE 40 MG: 20 INJECTION, SOLUTION EPIDURAL; INFILTRATION; INTRACAUDAL; PERINEURAL at 10:03

## 2019-09-27 RX ADMIN — FENTANYL CITRATE 100 MCG: 50 INJECTION, SOLUTION INTRAMUSCULAR; INTRAVENOUS at 10:03

## 2019-09-27 RX ADMIN — LABETALOL HYDROCHLORIDE 10 MG: 5 INJECTION INTRAVENOUS at 12:13

## 2019-09-27 RX ADMIN — ONDANSETRON HYDROCHLORIDE 4 MG: 2 INJECTION, SOLUTION INTRAMUSCULAR; INTRAVENOUS at 10:51

## 2019-09-27 RX ADMIN — OXYMETAZOLINE HCL 3 SPRAY: 0.05 SPRAY NASAL at 09:00

## 2019-09-27 RX ADMIN — GLYCOPYRROLATE 0.5 MG: 0.2 INJECTION, SOLUTION INTRAMUSCULAR; INTRAVENOUS at 10:59

## 2019-09-27 RX ADMIN — DEXAMETHASONE SODIUM PHOSPHATE 4 MG: 4 INJECTION, SOLUTION INTRAMUSCULAR; INTRAVENOUS at 11:09

## 2019-09-27 RX ADMIN — MIDAZOLAM HYDROCHLORIDE 2 MG: 1 INJECTION, SOLUTION INTRAMUSCULAR; INTRAVENOUS at 09:58

## 2019-09-27 RX ADMIN — CLINDAMYCIN PHOSPHATE 900 MG: 18 INJECTION, SOLUTION INTRAVENOUS at 09:58

## 2019-09-27 NOTE — PERIOP NOTES
Patient: Jh Gardner MRN: [id number]  SSN: [id number]   YOB: 1965  Age: 62 y.o. Sex: male     Patient is status post Procedure(s):  IMAGE GUIDANCE FUNCTIONAL ENDOSCOPIC WITH BILATERAL MAXILLARY ANTROSTOMY, ETHMOIDECTOMY WITH EXPLORATION RIGHT NASOFRONTAL DUCT,  BALLON SINUPLASTY. Surgeon(s) and Role:     * Aditya Fish MD - Primary    Local/Dose/Irrigation:  SEE MAR                  Peripheral IV 09/27/19 Left Forearm (Active)   Site Assessment Clean, dry, & intact 9/27/2019  8:46 AM   Phlebitis Assessment 0 9/27/2019  8:46 AM   Infiltration Assessment 0 9/27/2019  8:46 AM   Dressing Status New 9/27/2019  8:46 AM   Dressing Type Transparent;Tape 9/27/2019  8:46 AM   Hub Color/Line Status Pink; Infusing 9/27/2019  8:46 AM            Airway - Endotracheal Tube 09/27/19 Oral (Active)   Line Rinku Lips 9/27/2019 12:00 AM                   Dressing/Packing:  Wound Nose-Dressing Type: 2 x 2;Other (Comment)(XEROGEL NASAL/EPISTAXIS PACK X2) (09/27/19 0900)

## 2019-09-27 NOTE — PERIOP NOTES
Sugey Bennett  1965  [id number]    Situation:  Verbal report given from: JOELLEN North CRNA and DICK HainesRN  Procedure: Procedure(s):  IMAGE GUIDANCE FUNCTIONAL ENDOSCOPIC WITH BILATERAL MAXILLARY ANTROSTOMY, ETHMOIDECTOMY WITH EXPLORATION RIGHT NASOFRONTAL DUCT,  BALLON SINUPLASTY    Background:    Preoperative diagnosis: CHRONIC SINUSITIS    Postoperative diagnosis: CHRONIC SINUSITIS    :  Dr. Jojo Amaya    Assistant(s): Circ-1: Janis Bolton RN  Scrub Tech-1: Rolf Casanova    Specimens:   ID Type Source Tests Collected by Time Destination   1 : Nilo Ballesteros MD 9/27/2019 1036 Pathology   2 : Nilo Ballesteros MD 9/27/2019 1041 Pathology       Assessment:  Intra-procedure medications         Anesthesia gave intra-procedure sedation and medications, see anesthesia flow sheet     Intravenous fluids: LR@ KVO     Vital signs stable       Recommendation:    Permission to share finding with sister Ladigema Pulidos

## 2019-09-27 NOTE — PERIOP NOTES
Permission received to review discharge instructions and discuss private health information with Da Grmialdo, sister.

## 2019-09-27 NOTE — DISCHARGE INSTRUCTIONS
[facility], [place] Ross. Ryan Tellez M.D., Ph.D., F.A.C.S. [address], [address] [facility]  [phone]    POST OPERATIVE INSTRUCTIONS-SINUS SURGERY    The following instructions are designed to help you recover from surgery as easily as possible. Taking care of yourself can prevent complications. It is very important that you read this sheet often and follow the instructions carefully while you are at home. Your doctor or nurse will be happy to answer any questions. 1. DIET:  You may resume your normal diet in 24 hours. We suggest nothing heavy or greasy and avoid dairy products the first 24 hours. It is important to remember that good overall diet and health promotes healing. 2.  ACTIVITY RESTRICTIONS:  The following guidelines should be followed until your doctor tells you otherwise:   Do not blow your nose, sniff and if you have to sneeze or cough-do with mouth open   Do not lift anything over five pounds.  Do not bend over. Avoid doing things like tying your shoes or picking up things off the floor.  Do not do heavy exercise or play contact sports   Do not strain for a bowel movement. If you are constipated, take a stool softener or a gentle laxative.  Go back to work or school in one week. 3.  HOW TO TAKE CARE OF YOUR NOSE AND SINUSES:  Take the antibiotic prescribed by your doctor. Call if you have any problems or questions. We expect bloody drainage from your nose. You are to change the dressing below your nose as needed and expect to do this 10 to 12 times the first day. We want the drainage to come forward and not down your throat. We suggest you rest and sleep elevated on several pillows on your side. You will have less drainage each day and the swelling will reduce in several days. Call the office if you have bloody saturated gauze more than once an hour.     Clean the nasal openings twice daily with a Q-tip soaked in hydrogen peroxide until clear of all crusts. Take the pain medication prescribed by your doctor as needed for discomfort. No Aspirin, Motrin, Alleve, Advil or similar products for 3 weeks. You make take Tylenol (Acetaminophen) when you no longer need prescribed medication. Do not take Tylenol on top of pain medication because Tylenol is in the pain medication. Avoid smoke, dust, fumes or anything else that might irritate the nose. Protect yourself from any unexpected injury to your nose or face, such as being hit by small children or a restless bedmate. You may find that a cool mist room humidifier is helpful in decreasing the amount of dryness in your nose and mouth. After surgery you should use a nasal saline spray such as Ayr or Simply Saline. Use 4 sprays in each nostril every two hours while awake to help prevent crusts from forming in your nose. 4. RETURN APPOINTMENT:   You need to make a follow-up appointment with your doctor in 6 days. At this time your nose will be gently and carefully examined and your packing will be removed. 5. NOTIFY YOUR DOCTOR IF YOU HAVE:    A sudden increase in the amount of bleeding from the nose   A fever above 101 degrees F, which persists despite increasing the amount of fluids you take. A person with a fever should try to drink one cup of water each waking hour.  Persistent sharp pain that is not relieved by the pain medication you receive.  Increased swelling or redness of your nose. If you have any questions or concerns following your surgery do not hesitate to contact our office at [phone]     DO  [facility], [address] [facility], [address] [facility] TAKE NARCOTIC PAIN MEDICATIONS WITH FOOD     Narcotics tend to be constipating, we suggest taking a stool softener such as Colace or Miralax (follow package instructions).     DO NOT DRIVE WHILE TAKING NARCOTIC PAIN MEDICATIONS. DO NOT TAKE SLEEPING MEDICATIONS OR ANTIANXIETY MEDICATIONS WHILE TAKING NARCOTIC PAIN MEDICATIONS,  ESPECIALLY THE NIGHT OF ANESTHESIA! CPAP PATIENTS BE SURE TO WEAR MACHINE WHENEVER NAPPING OR SLEEPING! DISCHARGE SUMMARY from Nurse    The following personal items collected during your admission are returned to you:   Dental Appliance: Dental Appliances: None  Vision: Visual Aid: None  Hearing Aid:    Jewelry: Jewelry: None  Clothing: Clothing: With patient  Other Valuables: Other Valuables: None  Valuables sent to safe:        PATIENT INSTRUCTIONS:    After General Anesthesia or Intravenous Sedation, for 24 hours or while taking prescription Narcotics:        Someone should be with you for the next 24 hours. For your own safety, a responsible adult must drive you home. · Limit your activities  · Recommended activity: Rest today, up with assistance today. Do not climb stairs or shower unattended for the next 24 hours. · Please start with a soft bland diet and advance as tolerated (no nausea) to regular diet. · If you have a sore throat you should try the following: fluids, warm salt water gargles, or throat lozenges. If it does not improve after several days please follow up with your primary physician. · Do not drive and operate hazardous machinery  · Do not make important personal or business decisions  · Do  not drink alcoholic beverages  · If you have not urinated within 8 hours after discharge, please contact your surgeon on call.     Report the following to your surgeon:  · Excessive pain, swelling, redness or odor of or around the surgical area  · Temperature over 100.5  · Nausea and vomiting lasting longer than 4 hours or if unable to take medications  · Any signs of decreased circulation or nerve impairment to extremity: change in color, persistent  numbness, tingling, coldness or increase pain      · You will receive a Post Operative Call from one of the Recovery Room Nurses on the day after your surgery to check on you. It is very important for us to know how you are recovering after your surgery. If you have an issue or need to speak with someone, please call your surgeon, do not wait for the post operative call. · You may receive an e-mail or letter in the mail from [employer] regarding your experience with us in the Ambulatory Surgery Unit. Your feedback is valuable to us and we appreciate your participation in the survey. · If the above instructions are not adequate or you are having problems after your surgery, call the physician at their office number. · We wish you a speedy recovery ? What to do at Home:      *  Please give a list of your current medications to your Primary Care Provider. *  Please update this list whenever your medications are discontinued, doses are      changed, or new medications (including over-the-counter products) are added. *  Please carry medication information at all times in case of emergency situations. If you have not received your influenza and/or pneumococcal vaccine, please follow up with your primary care physician. The discharge information has been reviewed with the patient and caregiver. The patient and caregiver verbalized understanding.

## 2019-09-27 NOTE — ANESTHESIA PREPROCEDURE EVALUATION
Relevant Problems   No relevant active problems       Anesthetic History   No history of anesthetic complications            Review of Systems / Medical History  Patient summary reviewed, nursing notes reviewed and pertinent labs reviewed    Pulmonary          Smoker (quit 06/19)      Comments: Sarcoidosis   Neuro/Psych              Cardiovascular    Hypertension              Exercise tolerance: >4 METS  Comments: MVP   GI/Hepatic/Renal           Liver disease (cirrhosis )     Endo/Other        Arthritis (gout)     Other Findings   Comments: H/o heavy ETOH abuse; quit 10/18         Physical Exam    Airway  Mallampati: III  TM Distance: 4 - 6 cm  Neck ROM: normal range of motion   Mouth opening: Normal     Cardiovascular    Rhythm: regular  Rate: normal         Dental    Dentition: Caps/crowns     Pulmonary  Breath sounds clear to auscultation               Abdominal  GI exam deferred       Other Findings            Anesthetic Plan    ASA: 2  Anesthesia type: general          Induction: Intravenous  Anesthetic plan and risks discussed with: Patient

## 2019-09-27 NOTE — BRIEF OP NOTE
BRIEF OPERATIVE NOTE    Date of Procedure: 9/27/2019   Preoperative Diagnosis: CHRONIC BILATERAL MAXILLARY, ETHMOID RIGHT FRONTAL SINUSITIS  Postoperative Diagnosis: CHRONIC BILATERAL MAXILLARY, ETHMOID RIGHT FRONTAL SINUSITIS    Procedure(s):  IMAGE GUIDANCE FUNCTIONAL ENDOSCOPIC BILATERAL MAXILLARY ANTROSTOMY, ETHMOIDECTOMY WITH EXPLORATION RIGHT NASOFRONTAL DUCT WITH BALLON SINUPLASTY  Surgeon(s) and Role:     * Troy Castaneda MD - Primary         Surgical Assistant: None    Surgical Staff:  Circ-1: Ady Eubanks RN  Scrub Tech-1: Michaela Obando  Event Time In Time Out   Incision Start 1022    Incision Close 1059      Anesthesia: General   Estimated Blood Loss: 40 ml  Specimens:   ID Type Source Tests Collected by Time Destination   1 : Troy Martin MD 9/27/2019 1036 Pathology   2 : Gee Salvador MD 9/27/2019 1041 Pathology      Findings: As expected. Complications: None apparent.   Implants: * No implants in log *

## 2019-09-27 NOTE — ANESTHESIA POSTPROCEDURE EVALUATION
Procedure(s):  IMAGE GUIDANCE FUNCTIONAL ENDOSCOPIC WITH BILATERAL MAXILLARY ANTROSTOMY, ETHMOIDECTOMY WITH EXPLORATION RIGHT NASOFRONTAL DUCT,  BALLON SINUPLASTY. general    Anesthesia Post Evaluation      Multimodal analgesia: multimodal analgesia used between 6 hours prior to anesthesia start to PACU discharge  Patient location during evaluation: bedside  Patient participation: complete - patient participated  Level of consciousness: awake and alert  Pain score: 0  Airway patency: patent  Anesthetic complications: no  Cardiovascular status: acceptable, blood pressure returned to baseline and hypertensive  Respiratory status: acceptable  Hydration status: acceptable  Comments: Pt's BP runs high. He has been off medication for awhile. Told him to watch BP & referred to PCP.   Post anesthesia nausea and vomiting:  none      Vitals Value Taken Time   /101 9/27/2019 11:25 AM   Temp 36.8 °C (98.2 °F) 9/27/2019 11:16 AM   Pulse 104 9/27/2019 11:25 AM   Resp 18 9/27/2019 11:25 AM   SpO2 93 % 9/27/2019 11:25 AM

## 2019-09-28 NOTE — OP NOTES
[address]  OPERATIVE REPORT    Name:  Magdalena Calloway  MR#:  [id number]  :  1965  ACCOUNT #:  [de-identified]  DATE OF SERVICE:  2019    PREOPERATIVE DIAGNOSIS:  Chronic maxillary, ethmoid and right frontal sinusitis. POSTOPERATIVE DIAGNOSIS:  Chronic maxillary, ethmoid and right frontal sinusitis. PROCEDURE PERFORMED:  Bilateral endoscopic image guidance ethmoidectomy, bilateral endoscopic image guidance maxillary antrostomy, exploration of right nasofrontal duct with balloon sinuplasty. INDICATION:  The patient is a 73-year-old male with a long history of chronic sinusitis difficulty, which has been refractory to medical therapy. Preoperatively, the alternatives, potential benefits and possible risks of the procedure were explained to the patient who understood and requested to proceed. SURGEON:  Christelle Jacobson MD    ASSISTANT:  None. ANESTHESIA:  General endotracheal tube anesthesia. COMPLICATIONS:  None apparent. SPECIMENS REMOVED:  1. Contents of left middle meatus. 2.  Contents of right middle meatus. IMPLANTS:  None. ESTIMATED BLOOD LOSS:  40 mL. PROCEDURE:  The patient received 0.25% oxymetazoline in the preanesthesia holding area. The patient was brought to the operating room, placed supine on the operating table and following the smooth induction of general endotracheal tube anesthesia, the table was turned 90 degrees and the patient was positioned for operation. Further oxymetazoline on neurosurgical cottonoids was placed intranasally. The nasal vibrissae were trimmed. The Fusion image guidance system was applied and tested and found to function satisfactorily. A routine Betadine prep and drape was completed. Using a combination of 0-degree and 30-degree nasal telescopes, the nasal cavity was examined. Bilateral middle turbinate polypoid changes were observed consistent with the patient's longstanding chronic sinus condition.   No specific isolated intranasal polyps were specifically identified. 1% Xylocaine with epinephrine was injected to the middle meatus, middle turbinate and sphenopalatine artery regions bilaterally. After satisfactory vasoconstriction was observed, attention was turned to the left sinus cavity. Using a [place] elevator, the left middle turbinate was medialized to give access to the middle meatus. A sharp sickle knife was used to make an uncinate incision, which was carried inferiorly and posteriorly. Care was taken with use of the image guidance to avoid the nasolacrimal duct. A straight Blakesley forceps was used to remove the uncinate process and surrounding edematous mucosa. An anterior ethmoidectomy was carried out with use of the Blakesley forceps. The margins of the mucosa of the maxillary antrostomy and ethmoid cavity were optimized with a StraightShot handpiece and 4-mm sinus blade. A satisfactory maxillary antrostomy and ethmoid cavity was observed and attention was turned to the contralateral side. In nearly identical fashion, the right middle turbinate was medialized to give access to the right middle meatus. Edematous mucosa was incised to allow creation of a right maxillary antrostomy. Further dissection of the anterior ethmoid bulla was carried out with a straight Blakesley forceps. Once this was accomplished, the StraightShot handpiece and 4-mm sinus blade was used to further enlarge the right maxillary antrostomy and optimize the margins of mucosa at the right ethmoid cavity. The image guidance system was used throughout and bilaterally to allow identification and preservation of the lamina papyracea and fovea ethmoidalis. Once this was completed, attention was turned to exploration of the right nasofrontal duct. The right nasofrontal duct was crowded by surrounding agger nasi and anterior ethmoid cells prior to the patient's procedure.   It was felt that right nasofrontal duct balloon sinuplasty would be beneficial and limiting recurrence of his sinus difficulties. The balloon apparatus was brought into the field and after passage of the light catheter to the right frontal region and with confident transillumination of the right frontal bone, the balloon was passed and inflated superiorly and inferiorly. Once this was accomplished, satisfactory and relatively easy cannulation of the nasal frontal duct could be accomplished. The nasal cavity was subsequently irrigated after removal of the balloon apparatus. The final inspection using 0-degree and 30-degree telescopes showed satisfactory ethmoid and maxillary surgical sites. A sheet of XeroGel was placed to the sinus cavity on each side to conclude the patient's operation. The patient was subsequently aroused from general anesthesia, extubated in the operating room and transferred to the recovery area in satisfactory condition.       Alberto Baig MD [email or web address]  D:  09/27/2019 11:32  T:  09/27/2019 21:27  JOB #:  [id number]  CC:  DO Michael Tillman MD

## 2020-06-02 ENCOUNTER — HOSPITAL ENCOUNTER (OUTPATIENT)
Dept: ULTRASOUND IMAGING | Age: 55
Discharge: HOME | End: 2020-06-02
Attending: FAMILY MEDICINE
Payer: COMMERCIAL

## 2020-06-02 DIAGNOSIS — N50.89 TESTICULAR MASS: ICD-10-CM

## 2020-06-02 PROCEDURE — 76870 US EXAM SCROTUM: CPT

## 2022-01-01 ENCOUNTER — TELEPHONE (OUTPATIENT)
Dept: HEMATOLOGY | Age: 57
End: 2022-01-01

## 2022-01-01 ENCOUNTER — APPOINTMENT (OUTPATIENT)
Dept: GENERAL RADIOLOGY | Age: 57
DRG: 871 | End: 2022-01-01
Attending: EMERGENCY MEDICINE
Payer: COMMERCIAL

## 2022-01-01 ENCOUNTER — HOSPITAL ENCOUNTER (OUTPATIENT)
Dept: PULMONOLOGY | Age: 57
Discharge: HOME | End: 2022-04-25
Attending: INTERNAL MEDICINE
Payer: COMMERCIAL

## 2022-01-01 ENCOUNTER — HOSPITAL ENCOUNTER (OUTPATIENT)
Dept: NON INVASIVE DIAGNOSTICS | Age: 57
Discharge: HOME | End: 2022-04-28
Attending: INTERNAL MEDICINE
Payer: COMMERCIAL

## 2022-01-01 ENCOUNTER — TRANSCRIBE ORDER (OUTPATIENT)
Dept: SCHEDULING | Age: 57
End: 2022-01-01

## 2022-01-01 ENCOUNTER — ANESTHESIA EVENT (OUTPATIENT)
Dept: ENDOSCOPY | Age: 57
End: 2022-01-01
Payer: COMMERCIAL

## 2022-01-01 ENCOUNTER — HOSPITAL ENCOUNTER (EMERGENCY)
Age: 57
Discharge: OTHER INSTITUTION | End: 2022-05-16
Attending: EMERGENCY MEDICINE | Admitting: EMERGENCY MEDICINE
Payer: COMMERCIAL

## 2022-01-01 ENCOUNTER — HOSPITAL ENCOUNTER (OUTPATIENT)
Dept: ULTRASOUND IMAGING | Age: 57
Discharge: HOME | End: 2022-04-11
Attending: INTERNAL MEDICINE
Payer: COMMERCIAL

## 2022-01-01 ENCOUNTER — HOSPITAL ENCOUNTER (OUTPATIENT)
Dept: MRI IMAGING | Age: 57
Discharge: HOME | End: 2022-05-03
Attending: INTERNAL MEDICINE
Payer: COMMERCIAL

## 2022-01-01 ENCOUNTER — HOSPITAL ENCOUNTER (OUTPATIENT)
Dept: NUCLEAR MEDICINE | Age: 57
Discharge: HOME | End: 2022-04-28
Attending: INTERNAL MEDICINE
Payer: COMMERCIAL

## 2022-01-01 ENCOUNTER — OFFICE VISIT (OUTPATIENT)
Dept: HEMATOLOGY | Age: 57
End: 2022-01-01
Payer: COMMERCIAL

## 2022-01-01 ENCOUNTER — HOSPITAL ENCOUNTER (OUTPATIENT)
Dept: CT IMAGING | Age: 57
Discharge: HOME | End: 2022-03-10
Attending: FAMILY MEDICINE

## 2022-01-01 ENCOUNTER — ANESTHESIA (OUTPATIENT)
Dept: ENDOSCOPY | Age: 57
End: 2022-01-01
Payer: COMMERCIAL

## 2022-01-01 ENCOUNTER — HOSPITAL ENCOUNTER (INPATIENT)
Age: 57
LOS: 4 days | Discharge: HOME HEALTH | DRG: 871 | End: 2022-05-13
Attending: EMERGENCY MEDICINE | Admitting: INTERNAL MEDICINE
Payer: COMMERCIAL

## 2022-01-01 ENCOUNTER — HOSPITAL ENCOUNTER (OUTPATIENT)
Dept: ULTRASOUND IMAGING | Age: 57
Discharge: HOME | End: 2022-03-14
Attending: FAMILY MEDICINE
Payer: COMMERCIAL

## 2022-01-01 ENCOUNTER — APPOINTMENT (OUTPATIENT)
Dept: GENERAL RADIOLOGY | Age: 57
End: 2022-01-01
Attending: EMERGENCY MEDICINE
Payer: COMMERCIAL

## 2022-01-01 ENCOUNTER — HOSPITAL ENCOUNTER (OUTPATIENT)
Age: 57
Setting detail: OUTPATIENT SURGERY
Discharge: HOME | End: 2022-04-07
Attending: INTERNAL MEDICINE | Admitting: INTERNAL MEDICINE
Payer: COMMERCIAL

## 2022-01-01 ENCOUNTER — APPOINTMENT (OUTPATIENT)
Dept: CT IMAGING | Age: 57
DRG: 871 | End: 2022-01-01
Attending: EMERGENCY MEDICINE
Payer: COMMERCIAL

## 2022-01-01 ENCOUNTER — HOSPITAL ENCOUNTER (OUTPATIENT)
Dept: CT IMAGING | Age: 57
Discharge: HOME | End: 2022-05-03
Attending: FAMILY MEDICINE
Payer: COMMERCIAL

## 2022-01-01 ENCOUNTER — HOSPITAL ENCOUNTER (OUTPATIENT)
Dept: GENERAL RADIOLOGY | Age: 57
Discharge: HOME | End: 2022-03-03
Payer: COMMERCIAL

## 2022-01-01 ENCOUNTER — APPOINTMENT (OUTPATIENT)
Dept: NON INVASIVE DIAGNOSTICS | Age: 57
DRG: 871 | End: 2022-01-01
Attending: INTERNAL MEDICINE
Payer: COMMERCIAL

## 2022-01-01 VITALS
HEART RATE: 106 BPM | BODY MASS INDEX: 22.38 KG/M2 | SYSTOLIC BLOOD PRESSURE: 161 MMHG | RESPIRATION RATE: 11 BRPM | HEIGHT: 75 IN | WEIGHT: 180 LBS | OXYGEN SATURATION: 99 % | DIASTOLIC BLOOD PRESSURE: 73 MMHG | TEMPERATURE: 97.7 F

## 2022-01-01 VITALS
RESPIRATION RATE: 20 BRPM | TEMPERATURE: 98 F | DIASTOLIC BLOOD PRESSURE: 66 MMHG | SYSTOLIC BLOOD PRESSURE: 141 MMHG | WEIGHT: 186 LBS | HEIGHT: 75 IN | OXYGEN SATURATION: 94 % | HEART RATE: 103 BPM | BODY MASS INDEX: 23.13 KG/M2

## 2022-01-01 VITALS
TEMPERATURE: 97.7 F | HEART RATE: 103 BPM | WEIGHT: 186 LBS | DIASTOLIC BLOOD PRESSURE: 53 MMHG | BODY MASS INDEX: 23.13 KG/M2 | SYSTOLIC BLOOD PRESSURE: 119 MMHG | OXYGEN SATURATION: 98 % | RESPIRATION RATE: 16 BRPM | HEIGHT: 75 IN

## 2022-01-01 VITALS
TEMPERATURE: 98.5 F | RESPIRATION RATE: 12 BRPM | HEART RATE: 106 BPM | HEIGHT: 75 IN | WEIGHT: 186 LBS | DIASTOLIC BLOOD PRESSURE: 86 MMHG | BODY MASS INDEX: 23.13 KG/M2 | OXYGEN SATURATION: 97 % | SYSTOLIC BLOOD PRESSURE: 147 MMHG

## 2022-01-01 VITALS
HEART RATE: 106 BPM | TEMPERATURE: 97.2 F | OXYGEN SATURATION: 98 % | RESPIRATION RATE: 17 BRPM | SYSTOLIC BLOOD PRESSURE: 125 MMHG | DIASTOLIC BLOOD PRESSURE: 67 MMHG

## 2022-01-01 VITALS
BODY MASS INDEX: 23.13 KG/M2 | SYSTOLIC BLOOD PRESSURE: 136 MMHG | TEMPERATURE: 96.7 F | DIASTOLIC BLOOD PRESSURE: 71 MMHG | HEIGHT: 75 IN | WEIGHT: 186 LBS | OXYGEN SATURATION: 99 % | RESPIRATION RATE: 17 BRPM | HEART RATE: 81 BPM

## 2022-01-01 VITALS
WEIGHT: 186.07 LBS | HEIGHT: 75 IN | SYSTOLIC BLOOD PRESSURE: 119 MMHG | BODY MASS INDEX: 23.14 KG/M2 | DIASTOLIC BLOOD PRESSURE: 53 MMHG

## 2022-01-01 VITALS — WEIGHT: 186 LBS | HEIGHT: 75 IN | BODY MASS INDEX: 23.13 KG/M2

## 2022-01-01 DIAGNOSIS — D72.829 LEUKOCYTOSIS, UNSPECIFIED TYPE: ICD-10-CM

## 2022-01-01 DIAGNOSIS — D68.9 COAGULOPATHY (HCC): ICD-10-CM

## 2022-01-01 DIAGNOSIS — R31.9 HEMATURIA: ICD-10-CM

## 2022-01-01 DIAGNOSIS — K70.30 ALCOHOLIC CIRRHOSIS OF LIVER WITHOUT ASCITES (HCC): ICD-10-CM

## 2022-01-01 DIAGNOSIS — R04.2 HEMOPTYSIS: Primary | ICD-10-CM

## 2022-01-01 DIAGNOSIS — D64.9 ANEMIA, UNSPECIFIED TYPE: ICD-10-CM

## 2022-01-01 DIAGNOSIS — R17 JAUNDICE: ICD-10-CM

## 2022-01-01 DIAGNOSIS — K70.31 ASCITES DUE TO ALCOHOLIC CIRRHOSIS (HCC): ICD-10-CM

## 2022-01-01 DIAGNOSIS — D72.819 LEUCOPENIA: ICD-10-CM

## 2022-01-01 DIAGNOSIS — R04.2 COUGHING UP BLOOD: Primary | ICD-10-CM

## 2022-01-01 DIAGNOSIS — R04.2 HEMOPTYSIS: ICD-10-CM

## 2022-01-01 DIAGNOSIS — K70.9 ALCOHOLIC LIVER DISEASE (HCC): ICD-10-CM

## 2022-01-01 DIAGNOSIS — K70.30 ALCOHOLIC CIRRHOSIS OF LIVER WITHOUT ASCITES (HCC): Primary | ICD-10-CM

## 2022-01-01 DIAGNOSIS — A41.9 SEPSIS, DUE TO UNSPECIFIED ORGANISM, UNSPECIFIED WHETHER ACUTE ORGAN DYSFUNCTION PRESENT (HCC): ICD-10-CM

## 2022-01-01 DIAGNOSIS — K74.60 CIRRHOSIS (HCC): ICD-10-CM

## 2022-01-01 DIAGNOSIS — K70.31 ASCITES DUE TO ALCOHOLIC CIRRHOSIS (HCC): Primary | ICD-10-CM

## 2022-01-01 DIAGNOSIS — K76.6 PORTAL HYPERTENSIVE GASTROPATHY (HCC): ICD-10-CM

## 2022-01-01 DIAGNOSIS — K74.60 CIRRHOSIS (HCC): Primary | ICD-10-CM

## 2022-01-01 DIAGNOSIS — R31.9 URINARY TRACT INFECTION WITH HEMATURIA, SITE UNSPECIFIED: ICD-10-CM

## 2022-01-01 DIAGNOSIS — R41.0 ACUTE DELIRIUM: ICD-10-CM

## 2022-01-01 DIAGNOSIS — S27.2XXA TRAUMATIC HEMOPNEUMOTHORAX, INITIAL ENCOUNTER: Primary | ICD-10-CM

## 2022-01-01 DIAGNOSIS — K31.89 PORTAL HYPERTENSIVE GASTROPATHY (HCC): ICD-10-CM

## 2022-01-01 DIAGNOSIS — K76.82 HEPATIC ENCEPHALOPATHY: Primary | ICD-10-CM

## 2022-01-01 DIAGNOSIS — N30.01 ACUTE CYSTITIS WITH HEMATURIA: ICD-10-CM

## 2022-01-01 DIAGNOSIS — K72.10 END STAGE LIVER DISEASE (HCC): ICD-10-CM

## 2022-01-01 DIAGNOSIS — D69.6 THROMBOCYTOPENIA (HCC): ICD-10-CM

## 2022-01-01 DIAGNOSIS — K20.90 ESOPHAGITIS: ICD-10-CM

## 2022-01-01 DIAGNOSIS — R53.1 WEAKNESS GENERALIZED: ICD-10-CM

## 2022-01-01 DIAGNOSIS — N39.0 URINARY TRACT INFECTION WITH HEMATURIA, SITE UNSPECIFIED: ICD-10-CM

## 2022-01-01 LAB
A1AT SERPL-MCNC: 189 MG/DL (ref 101–187)
ABO + RH BLD: NORMAL
ABO GROUP BLD: NORMAL
AFP L3 MFR SERPL: 8 % (ref 0–9.9)
AFP SERPL-MCNC: 9.9 NG/ML (ref 0–8.4)
ALBUMIN FLD-MCNC: 0.1 G/DL
ALBUMIN SERPL-MCNC: 2 G/DL (ref 3.5–5)
ALBUMIN SERPL-MCNC: 2.2 G/DL (ref 3.5–5)
ALBUMIN SERPL-MCNC: 2.4 G/DL (ref 3.5–5)
ALBUMIN SERPL-MCNC: 3 G/DL (ref 3.8–4.9)
ALBUMIN SERPL-MCNC: 3.4 G/DL (ref 3.8–4.9)
ALBUMIN/GLOB SERPL: 0.6 {RATIO} (ref 1.1–2.2)
ALP SERPL-CCNC: 182 IU/L (ref 44–121)
ALP SERPL-CCNC: 192 IU/L (ref 44–121)
ALP SERPL-CCNC: 251 U/L (ref 45–117)
ALP SERPL-CCNC: 288 U/L (ref 45–117)
ALP SERPL-CCNC: 325 U/L (ref 45–117)
ALT SERPL-CCNC: 34 IU/L (ref 0–44)
ALT SERPL-CCNC: 57 IU/L (ref 0–44)
ALT SERPL-CCNC: 76 U/L (ref 12–78)
ALT SERPL-CCNC: 78 U/L (ref 12–78)
ALT SERPL-CCNC: 93 U/L (ref 12–78)
AMMONIA PLAS-SCNC: <10 UMOL/L
AMPHET UR QL SCN: NEGATIVE
AMPHETAMINES BLD QL SCN: NEGATIVE NG/ML
ANA TITR SER IF: NEGATIVE {TITER}
ANION GAP SERPL CALC-SCNC: 12 MMOL/L (ref 5–15)
ANION GAP SERPL CALC-SCNC: 6 MMOL/L (ref 5–15)
ANION GAP SERPL CALC-SCNC: 7 MMOL/L (ref 5–15)
ANION GAP SERPL CALC-SCNC: 8 MMOL/L (ref 5–15)
ANION GAP SERPL CALC-SCNC: 8 MMOL/L (ref 5–15)
ANION GAP SERPL CALC-SCNC: 9 MMOL/L (ref 5–15)
ANION GAP SERPL CALC-SCNC: 9 MMOL/L (ref 5–15)
APAP SERPL-MCNC: <2 UG/ML (ref 10–30)
APPEARANCE FLD: CLEAR
APPEARANCE UR: ABNORMAL
APTT PPP: 39.1 SEC (ref 22.1–31)
ARTERIAL PATENCY WRIST A: POSITIVE
ARTERIAL PATENCY WRIST A: POSITIVE
AST SERPL-CCNC: 49 U/L (ref 15–37)
AST SERPL-CCNC: 56 U/L (ref 15–37)
AST SERPL-CCNC: 62 U/L (ref 15–37)
AST SERPL-CCNC: 65 IU/L (ref 0–40)
AST SERPL-CCNC: 91 IU/L (ref 0–40)
ATRIAL RATE: 105 BPM
ATRIAL RATE: 116 BPM
ATRIAL RATE: 119 BPM
BACTERIA SPEC CULT: ABNORMAL
BACTERIA SPEC CULT: NORMAL
BACTERIA URNS QL MICRO: ABNORMAL /HPF
BARBITURATES SERPLBLD QL: NEGATIVE UG/ML
BARBITURATES UR QL SCN: NEGATIVE
BASE EXCESS BLD CALC-SCNC: 0.3 MMOL/L
BASE EXCESS BLD CALC-SCNC: 0.7 MMOL/L
BASOPHILS # BLD AUTO: 0 X10E3/UL (ref 0–0.2)
BASOPHILS # BLD AUTO: 0 X10E3/UL (ref 0–0.2)
BASOPHILS # BLD: 0 K/UL (ref 0–0.1)
BASOPHILS NFR BLD AUTO: 0 %
BASOPHILS NFR BLD AUTO: 1 %
BASOPHILS NFR BLD: 0 % (ref 0–1)
BDY SITE: ABNORMAL
BDY SITE: ABNORMAL
BENZODIAZ UR QL: NEGATIVE
BILIRUB DIRECT SERPL-MCNC: 4.58 MG/DL (ref 0–0.4)
BILIRUB DIRECT SERPL-MCNC: 9.17 MG/DL (ref 0–0.4)
BILIRUB SERPL-MCNC: 13.9 MG/DL (ref 0.2–1)
BILIRUB SERPL-MCNC: 18 MG/DL (ref 0.2–1)
BILIRUB SERPL-MCNC: 19.4 MG/DL (ref 0–1.2)
BILIRUB SERPL-MCNC: 20.3 MG/DL (ref 0.2–1)
BILIRUB SERPL-MCNC: 9.6 MG/DL (ref 0–1.2)
BILIRUB UR QL CFM: POSITIVE
BLOOD GROUP ANTIBODIES SERPL: NORMAL
BNP SERPL-MCNC: 565 PG/ML
BUN SERPL-MCNC: 20 MG/DL (ref 6–20)
BUN SERPL-MCNC: 21 MG/DL (ref 6–20)
BUN SERPL-MCNC: 25 MG/DL (ref 6–20)
BUN SERPL-MCNC: 26 MG/DL (ref 6–20)
BUN SERPL-MCNC: 27 MG/DL (ref 6–24)
BUN SERPL-MCNC: 30 MG/DL (ref 6–20)
BUN SERPL-MCNC: 34 MG/DL (ref 6–20)
BUN SERPL-MCNC: 36 MG/DL (ref 6–20)
BUN SERPL-MCNC: 7 MG/DL (ref 6–24)
BUN/CREAT SERPL: 10 (ref 9–20)
BUN/CREAT SERPL: 19 (ref 9–20)
BUN/CREAT SERPL: 23 (ref 12–20)
BUN/CREAT SERPL: 24 (ref 12–20)
BUN/CREAT SERPL: 25 (ref 12–20)
BUN/CREAT SERPL: 25 (ref 12–20)
BUN/CREAT SERPL: 27 (ref 12–20)
BUN/CREAT SERPL: 31 (ref 12–20)
BUN/CREAT SERPL: 32 (ref 12–20)
CALCIUM SERPL-MCNC: 7.6 MG/DL (ref 8.5–10.1)
CALCIUM SERPL-MCNC: 7.6 MG/DL (ref 8.5–10.1)
CALCIUM SERPL-MCNC: 7.7 MG/DL (ref 8.5–10.1)
CALCIUM SERPL-MCNC: 7.9 MG/DL (ref 8.5–10.1)
CALCIUM SERPL-MCNC: 7.9 MG/DL (ref 8.5–10.1)
CALCIUM SERPL-MCNC: 8.4 MG/DL (ref 8.5–10.1)
CALCIUM SERPL-MCNC: 8.4 MG/DL (ref 8.7–10.2)
CALCIUM SERPL-MCNC: 8.5 MG/DL (ref 8.5–10.1)
CALCIUM SERPL-MCNC: 8.6 MG/DL (ref 8.7–10.2)
CALCULATED P AXIS, ECG09: -2 DEGREES
CALCULATED P AXIS, ECG09: 85 DEGREES
CALCULATED P AXIS, ECG09: 87 DEGREES
CALCULATED R AXIS, ECG10: 67 DEGREES
CALCULATED R AXIS, ECG10: 68 DEGREES
CALCULATED R AXIS, ECG10: 71 DEGREES
CALCULATED T AXIS, ECG11: 63 DEGREES
CALCULATED T AXIS, ECG11: 79 DEGREES
CALCULATED T AXIS, ECG11: 84 DEGREES
CANNABINOIDS BLD QL SCN: NEGATIVE NG/ML
CANNABINOIDS UR QL SCN: NEGATIVE
CC UR VC: ABNORMAL
CERULOPLASMIN SERPL-MCNC: 21.5 MG/DL (ref 16–31)
CHLORIDE SERPL-SCNC: 100 MMOL/L (ref 96–106)
CHLORIDE SERPL-SCNC: 102 MMOL/L (ref 97–108)
CHLORIDE SERPL-SCNC: 103 MMOL/L (ref 97–108)
CHLORIDE SERPL-SCNC: 104 MMOL/L (ref 97–108)
CHLORIDE SERPL-SCNC: 106 MMOL/L (ref 97–108)
CHLORIDE SERPL-SCNC: 106 MMOL/L (ref 97–108)
CHLORIDE SERPL-SCNC: 107 MMOL/L (ref 97–108)
CHLORIDE SERPL-SCNC: 95 MMOL/L (ref 96–106)
CHLORIDE SERPL-SCNC: 98 MMOL/L (ref 97–108)
CHOLEST SERPL-MCNC: 105 MG/DL (ref 100–199)
CHOLEST SERPL-MCNC: 76 MG/DL
CMV IGG SERPL IA-ACNC: <0.6 U/ML (ref 0–0.59)
CMV IGM SERPL IA-ACNC: <30 AU/ML (ref 0–29.9)
CO2 SERPL-SCNC: 18 MMOL/L (ref 21–32)
CO2 SERPL-SCNC: 20 MMOL/L (ref 20–29)
CO2 SERPL-SCNC: 20 MMOL/L (ref 21–32)
CO2 SERPL-SCNC: 21 MMOL/L (ref 20–29)
CO2 SERPL-SCNC: 22 MMOL/L (ref 21–32)
CO2 SERPL-SCNC: 23 MMOL/L (ref 21–32)
CO2 SERPL-SCNC: 24 MMOL/L (ref 21–32)
COCAINE UR QL SCN: NEGATIVE
COCAINE+BZE SERPLBLD QL SCN: NEGATIVE NG/ML
COLOR FLD: YELLOW
COLOR UR: ABNORMAL
COMMENT, HOLDF: NORMAL
COMMENT, HOLDF: NORMAL
CREAT SERPL-MCNC: 0.68 MG/DL (ref 0.76–1.27)
CREAT SERPL-MCNC: 0.81 MG/DL (ref 0.7–1.3)
CREAT SERPL-MCNC: 0.83 MG/DL (ref 0.7–1.3)
CREAT SERPL-MCNC: 0.88 MG/DL (ref 0.7–1.3)
CREAT SERPL-MCNC: 0.93 MG/DL (ref 0.7–1.3)
CREAT SERPL-MCNC: 1.08 MG/DL (ref 0.7–1.3)
CREAT SERPL-MCNC: 1.27 MG/DL (ref 0.7–1.3)
CREAT SERPL-MCNC: 1.4 MG/DL (ref 0.76–1.27)
CREAT SERPL-MCNC: 1.45 MG/DL (ref 0.7–1.3)
CRP SERPL-MCNC: 1.51 MG/DL (ref 0–0.6)
D DIMER PPP FEU-MCNC: 5.41 MG/L FEU (ref 0–0.65)
DIAGNOSIS, 93000: NORMAL
DIFFERENTIAL METHOD BLD: ABNORMAL
DRUG SCRN COMMENT,DRGCM: NORMAL
EBV NA IGG SER IA-ACNC: 132 U/ML (ref 0–17.9)
EBV VCA IGG SER IA-ACNC: 262 U/ML (ref 0–17.9)
EBV VCA IGM SER IA-ACNC: <36 U/ML (ref 0–35.9)
ECHO AO ROOT DIAM: 3.8 CM
ECHO AO ROOT DIAM: 4 CM
ECHO AO ROOT INDEX: 1.78 CM/M2
ECHO AO ROOT INDEX: 1.9 CM/M2
ECHO AV AREA PEAK VELOCITY: 3.6 CM2
ECHO AV AREA/BSA PEAK VELOCITY: 1.7 CM2/M2
ECHO AV MEAN GRADIENT: 6 MMHG
ECHO AV MEAN VELOCITY: 1.2 M/S
ECHO AV PEAK GRADIENT: 12 MMHG
ECHO AV PEAK GRADIENT: 15 MMHG
ECHO AV PEAK VELOCITY: 1.7 M/S
ECHO AV PEAK VELOCITY: 2 M/S
ECHO AV VELOCITY RATIO: 0.85
ECHO AV VTI: 32.9 CM
ECHO EST RA PRESSURE: 3 MMHG
ECHO LA DIAMETER INDEX: 1.92 CM/M2
ECHO LA DIAMETER INDEX: 2.43 CM/M2
ECHO LA DIAMETER: 4.1 CM
ECHO LA DIAMETER: 5.1 CM
ECHO LA TO AORTIC ROOT RATIO: 1.08
ECHO LA TO AORTIC ROOT RATIO: 1.28
ECHO LA VOL 2C: 59 ML (ref 18–58)
ECHO LA VOL 4C: 105 ML (ref 18–58)
ECHO LA VOLUME AREA LENGTH: 97 ML
ECHO LA VOLUME INDEX A2C: 28 ML/M2 (ref 16–34)
ECHO LA VOLUME INDEX A4C: 49 ML/M2 (ref 16–34)
ECHO LA VOLUME INDEX AREA LENGTH: 46 ML/M2 (ref 16–34)
ECHO LV E' LATERAL VELOCITY: 10 CM/S
ECHO LV E' LATERAL VELOCITY: 13 CM/S
ECHO LV E' SEPTAL VELOCITY: 10 CM/S
ECHO LV E' SEPTAL VELOCITY: 9 CM/S
ECHO LV EDV A2C: 110 ML
ECHO LV EDV A4C: 114 ML
ECHO LV EDV BP: 114 ML (ref 67–155)
ECHO LV EDV INDEX A4C: 54 ML/M2
ECHO LV EDV INDEX BP: 54 ML/M2
ECHO LV EDV NDEX A2C: 52 ML/M2
ECHO LV EJECTION FRACTION A2C: 58 %
ECHO LV EJECTION FRACTION A4C: 66 %
ECHO LV EJECTION FRACTION BIPLANE: 63 % (ref 55–100)
ECHO LV ESV A2C: 46 ML
ECHO LV ESV A4C: 39 ML
ECHO LV ESV BP: 43 ML (ref 22–58)
ECHO LV ESV INDEX A2C: 22 ML/M2
ECHO LV ESV INDEX A4C: 18 ML/M2
ECHO LV ESV INDEX BP: 20 ML/M2
ECHO LV FRACTIONAL SHORTENING: 38 % (ref 28–44)
ECHO LV FRACTIONAL SHORTENING: 41 % (ref 28–44)
ECHO LV INTERNAL DIMENSION DIASTOLE INDEX: 2.19 CM/M2
ECHO LV INTERNAL DIMENSION DIASTOLE INDEX: 2.58 CM/M2
ECHO LV INTERNAL DIMENSION DIASTOLIC: 4.6 CM (ref 4.2–5.9)
ECHO LV INTERNAL DIMENSION DIASTOLIC: 5.5 CM (ref 4.2–5.9)
ECHO LV INTERNAL DIMENSION SYSTOLIC INDEX: 1.29 CM/M2
ECHO LV INTERNAL DIMENSION SYSTOLIC INDEX: 1.6 CM/M2
ECHO LV INTERNAL DIMENSION SYSTOLIC: 2.7 CM
ECHO LV INTERNAL DIMENSION SYSTOLIC: 3.4 CM
ECHO LV IVSD: 0.9 CM (ref 0.6–1)
ECHO LV IVSD: 1.1 CM (ref 0.6–1)
ECHO LV MASS 2D: 172.7 G (ref 88–224)
ECHO LV MASS 2D: 192.9 G (ref 88–224)
ECHO LV MASS INDEX 2D: 81.1 G/M2 (ref 49–115)
ECHO LV MASS INDEX 2D: 91.9 G/M2 (ref 49–115)
ECHO LV POSTERIOR WALL DIASTOLIC: 0.8 CM (ref 0.6–1)
ECHO LV POSTERIOR WALL DIASTOLIC: 1.2 CM (ref 0.6–1)
ECHO LV RELATIVE WALL THICKNESS RATIO: 0.29
ECHO LV RELATIVE WALL THICKNESS RATIO: 0.52
ECHO LVOT AREA: 4.2 CM2
ECHO LVOT DIAM: 2.3 CM
ECHO LVOT PEAK GRADIENT: 12 MMHG
ECHO LVOT PEAK VELOCITY: 1.7 M/S
ECHO MV A VELOCITY: 1.13 M/S
ECHO MV A VELOCITY: 1.13 M/S
ECHO MV AREA PHT: 2.7 CM2
ECHO MV AREA PHT: 2.9 CM2
ECHO MV E DECELERATION TIME (DT): 260.6 MS
ECHO MV E DECELERATION TIME (DT): 281.6 MS
ECHO MV E VELOCITY: 0.85 M/S
ECHO MV E VELOCITY: 0.99 M/S
ECHO MV E/A RATIO: 0.75
ECHO MV E/A RATIO: 0.88
ECHO MV E/E' LATERAL: 7.62
ECHO MV E/E' LATERAL: 8.5
ECHO MV E/E' RATIO (AVERAGED): 8.76
ECHO MV E/E' RATIO (AVERAGED): 8.97
ECHO MV E/E' SEPTAL: 9.44
ECHO MV E/E' SEPTAL: 9.9
ECHO MV PRESSURE HALF TIME (PHT): 75.6 MS
ECHO MV PRESSURE HALF TIME (PHT): 81.7 MS
ECHO PULMONARY ARTERY SYSTOLIC PRESSURE (PASP): 60 MMHG
ECHO PV MAX VELOCITY: 1.5 M/S
ECHO PV PEAK GRADIENT: 9 MMHG
ECHO RV FREE WALL PEAK S': 19 CM/S
ECHO RV TAPSE: 3.1 CM (ref 1.7–?)
ECHO RV TAPSE: 3.6 CM (ref 1.7–?)
ECHO TV REGURGITANT MAX VELOCITY: 3.54 M/S
ECHO TV REGURGITANT PEAK GRADIENT: 50 MMHG
EGFR: 107 ML/MIN/1.73
EGFR: 58 ML/MIN/1.73
EOSINOPHIL # BLD AUTO: 0.1 X10E3/UL (ref 0–0.4)
EOSINOPHIL # BLD AUTO: 0.1 X10E3/UL (ref 0–0.4)
EOSINOPHIL # BLD: 0 K/UL (ref 0–0.4)
EOSINOPHIL # BLD: 0 K/UL (ref 0–0.4)
EOSINOPHIL # BLD: 0.1 K/UL (ref 0–0.4)
EOSINOPHIL # BLD: 0.1 K/UL (ref 0–0.4)
EOSINOPHIL # BLD: 0.2 K/UL (ref 0–0.4)
EOSINOPHIL # BLD: 0.2 K/UL (ref 0–0.4)
EOSINOPHIL # BLD: 0.3 K/UL (ref 0–0.4)
EOSINOPHIL NFR BLD AUTO: 2 %
EOSINOPHIL NFR BLD AUTO: 2 %
EOSINOPHIL NFR BLD: 0 % (ref 0–7)
EOSINOPHIL NFR BLD: 0 % (ref 0–7)
EOSINOPHIL NFR BLD: 1 % (ref 0–7)
EOSINOPHIL NFR BLD: 1 % (ref 0–7)
EOSINOPHIL NFR BLD: 2 % (ref 0–7)
EPITH CASTS URNS QL MICRO: ABNORMAL /LPF
ERYTHROCYTE [DISTWIDTH] IN BLOOD BY AUTOMATED COUNT: 11.6 % (ref 11.6–15.4)
ERYTHROCYTE [DISTWIDTH] IN BLOOD BY AUTOMATED COUNT: 15.5 % (ref 11.5–14.5)
ERYTHROCYTE [DISTWIDTH] IN BLOOD BY AUTOMATED COUNT: 15.6 % (ref 11.5–14.5)
ERYTHROCYTE [DISTWIDTH] IN BLOOD BY AUTOMATED COUNT: 15.7 % (ref 11.5–14.5)
ERYTHROCYTE [DISTWIDTH] IN BLOOD BY AUTOMATED COUNT: 15.7 % (ref 11.5–14.5)
ERYTHROCYTE [DISTWIDTH] IN BLOOD BY AUTOMATED COUNT: 15.9 % (ref 11.5–14.5)
ERYTHROCYTE [DISTWIDTH] IN BLOOD BY AUTOMATED COUNT: 16 % (ref 11.6–15.4)
ERYTHROCYTE [DISTWIDTH] IN BLOOD BY AUTOMATED COUNT: 16.2 % (ref 11.5–14.5)
ERYTHROCYTE [DISTWIDTH] IN BLOOD BY AUTOMATED COUNT: 17.4 % (ref 11.5–14.5)
EST. AVERAGE GLUCOSE BLD GHB EST-MCNC: <74 MG/DL
ETHANOL BLD GC-MCNC: <.01 %
ETHANOL BLD GC-MCNC: <.01 %
ETHANOL SERPL-MCNC: <10 MG/DL
FERRITIN SERPL-MCNC: 514 NG/ML (ref 26–388)
FERRITIN SERPL-MCNC: 937 NG/ML (ref 30–400)
FOLATE SERPL-MCNC: 11.5 NG/ML (ref 5–21)
GAMMA INTERFERON BACKGROUND BLD IA-ACNC: 0.02 IU/ML
GAS FLOW.O2 O2 DELIVERY SYS: ABNORMAL L/MIN
GAS FLOW.O2 O2 DELIVERY SYS: ABNORMAL L/MIN
GLOBULIN SER CALC-MCNC: 3.3 G/DL (ref 2–4)
GLOBULIN SER CALC-MCNC: 3.6 G/DL (ref 2–4)
GLOBULIN SER CALC-MCNC: 4.2 G/DL (ref 2–4)
GLUCOSE SERPL-MCNC: 108 MG/DL (ref 65–99)
GLUCOSE SERPL-MCNC: 114 MG/DL (ref 65–100)
GLUCOSE SERPL-MCNC: 117 MG/DL (ref 65–100)
GLUCOSE SERPL-MCNC: 125 MG/DL (ref 65–100)
GLUCOSE SERPL-MCNC: 127 MG/DL (ref 65–100)
GLUCOSE SERPL-MCNC: 129 MG/DL (ref 65–100)
GLUCOSE SERPL-MCNC: 156 MG/DL (ref 65–99)
GLUCOSE SERPL-MCNC: 193 MG/DL (ref 65–100)
GLUCOSE SERPL-MCNC: 201 MG/DL (ref 65–100)
GLUCOSE UR STRIP.AUTO-MCNC: NEGATIVE MG/DL
HAV AB SER QL IA: POSITIVE
HBA1C MFR BLD: <4.2 % (ref 4.8–5.6)
HBV CORE AB SERPL QL IA: NEGATIVE
HBV SURFACE AB SER QL: NON REACTIVE
HBV SURFACE AG SERPL QL IA: NEGATIVE
HCO3 BLD-SCNC: 22.5 MMOL/L (ref 22–26)
HCO3 BLD-SCNC: 22.9 MMOL/L (ref 22–26)
HCT VFR BLD AUTO: 19.6 % (ref 37.5–51)
HCT VFR BLD AUTO: 20.7 % (ref 36.6–50.3)
HCT VFR BLD AUTO: 22.7 % (ref 36.6–50.3)
HCT VFR BLD AUTO: 22.9 % (ref 36.6–50.3)
HCT VFR BLD AUTO: 23.6 % (ref 36.6–50.3)
HCT VFR BLD AUTO: 23.8 % (ref 36.6–50.3)
HCT VFR BLD AUTO: 25.5 % (ref 36.6–50.3)
HCT VFR BLD AUTO: 28.9 % (ref 36.6–50.3)
HCT VFR BLD AUTO: 31 % (ref 37.5–51)
HCV AB S/CO SERPL IA: <0.1 S/CO RATIO (ref 0–0.9)
HCV AB SERPL QL IA: NORMAL
HDLC SERPL-MCNC: 27 MG/DL
HDLC SERPL-MCNC: 57 MG/DL
HDLC SERPL: 1.3 {RATIO} (ref 0–5)
HFE GENE MUT ANL BLD/T: NORMAL
HGB BLD-MCNC: 11.2 G/DL (ref 13–17.7)
HGB BLD-MCNC: 7 G/DL (ref 12.1–17)
HGB BLD-MCNC: 7.1 G/DL (ref 13–17.7)
HGB BLD-MCNC: 7.6 G/DL (ref 12.1–17)
HGB BLD-MCNC: 7.7 G/DL (ref 12.1–17)
HGB BLD-MCNC: 7.8 G/DL (ref 12.1–17)
HGB BLD-MCNC: 7.8 G/DL (ref 12.1–17)
HGB BLD-MCNC: 8.5 G/DL (ref 12.1–17)
HGB BLD-MCNC: 9.5 G/DL (ref 12.1–17)
HGB UR QL STRIP: ABNORMAL
HIV 1+2 AB+HIV1 P24 AG SERPL QL IA: NON REACTIVE
HSV1 IGG SER IA-ACNC: 19.5 INDEX (ref 0–0.9)
HSV2 IGG SER IA-ACNC: <0.91 INDEX (ref 0–0.9)
IMM GRANULOCYTES # BLD AUTO: 0 K/UL
IMM GRANULOCYTES # BLD AUTO: 0 X10E3/UL (ref 0–0.1)
IMM GRANULOCYTES # BLD AUTO: 0.1 K/UL (ref 0–0.04)
IMM GRANULOCYTES # BLD AUTO: 0.1 X10E3/UL (ref 0–0.1)
IMM GRANULOCYTES # BLD AUTO: 0.2 K/UL (ref 0–0.04)
IMM GRANULOCYTES # BLD AUTO: 0.3 K/UL (ref 0–0.04)
IMM GRANULOCYTES # BLD AUTO: 0.5 K/UL (ref 0–0.04)
IMM GRANULOCYTES NFR BLD AUTO: 0 %
IMM GRANULOCYTES NFR BLD AUTO: 1 %
IMM GRANULOCYTES NFR BLD AUTO: 1 %
IMM GRANULOCYTES NFR BLD AUTO: 1 % (ref 0–0.5)
IMM GRANULOCYTES NFR BLD AUTO: 2 % (ref 0–0.5)
IMM GRANULOCYTES NFR BLD AUTO: 2 % (ref 0–0.5)
IMM GRANULOCYTES NFR BLD AUTO: 3 % (ref 0–0.5)
INR PPP: 2 (ref 0.9–1.1)
INR PPP: 2.1 (ref 0.9–1.1)
INR PPP: 2.1 (ref 0.9–1.2)
INR PPP: 2.4 (ref 0.9–1.2)
IRON SATN MFR SERPL: 43 % (ref 20–50)
IRON SATN MFR SERPL: 87 % (ref 15–55)
IRON SERPL-MCNC: 162 UG/DL (ref 38–169)
IRON SERPL-MCNC: 94 UG/DL (ref 35–150)
KETONES UR QL STRIP.AUTO: NEGATIVE MG/DL
LACTATE SERPL-SCNC: 2.2 MMOL/L (ref 0.4–2)
LACTATE SERPL-SCNC: 2.3 MMOL/L (ref 0.4–2)
LDLC SERPL CALC-MCNC: 4.4 MG/DL (ref 0–100)
LDLC SERPL CALC-MCNC: 61 MG/DL (ref 0–99)
LEUKOCYTE ESTERASE UR QL STRIP.AUTO: ABNORMAL
LIPASE SERPL-CCNC: 596 U/L (ref 73–393)
LIPASE SERPL-CCNC: 865 U/L (ref 73–393)
LYMPHOCYTES # BLD AUTO: 0.9 X10E3/UL (ref 0.7–3.1)
LYMPHOCYTES # BLD AUTO: 1 X10E3/UL (ref 0.7–3.1)
LYMPHOCYTES # BLD: 0.4 K/UL (ref 0.8–3.5)
LYMPHOCYTES # BLD: 0.5 K/UL (ref 0.8–3.5)
LYMPHOCYTES # BLD: 0.5 K/UL (ref 0.8–3.5)
LYMPHOCYTES # BLD: 0.6 K/UL (ref 0.8–3.5)
LYMPHOCYTES # BLD: 0.7 K/UL (ref 0.8–3.5)
LYMPHOCYTES # BLD: 1.3 K/UL (ref 0.8–3.5)
LYMPHOCYTES # BLD: 1.5 K/UL (ref 0.8–3.5)
LYMPHOCYTES NFR BLD AUTO: 11 %
LYMPHOCYTES NFR BLD AUTO: 15 %
LYMPHOCYTES NFR BLD: 11 % (ref 12–49)
LYMPHOCYTES NFR BLD: 12 % (ref 12–49)
LYMPHOCYTES NFR BLD: 3 % (ref 12–49)
LYMPHOCYTES NFR BLD: 4 % (ref 12–49)
LYMPHOCYTES NFR BLD: 6 % (ref 12–49)
LYMPHOCYTES NFR FLD: 35 %
M TB IFN-G BLD-IMP: ABNORMAL
M TB IFN-G CD4+ BCKGRND COR BLD-ACNC: 0.01 IU/ML
MAGNESIUM SERPL-MCNC: 1.8 MG/DL (ref 1.6–2.4)
MAGNESIUM SERPL-MCNC: 1.9 MG/DL (ref 1.6–2.3)
MCH RBC QN AUTO: 39.3 PG (ref 26.6–33)
MCH RBC QN AUTO: 40 PG (ref 26–34)
MCH RBC QN AUTO: 40.2 PG (ref 26–34)
MCH RBC QN AUTO: 40.3 PG (ref 26.6–33)
MCH RBC QN AUTO: 40.4 PG (ref 26–34)
MCH RBC QN AUTO: 40.5 PG (ref 26–34)
MCH RBC QN AUTO: 40.7 PG (ref 26–34)
MCH RBC QN AUTO: 41.2 PG (ref 26–34)
MCH RBC QN AUTO: 41.3 PG (ref 26–34)
MCHC RBC AUTO-ENTMCNC: 32.4 G/DL (ref 30–36.5)
MCHC RBC AUTO-ENTMCNC: 32.9 G/DL (ref 30–36.5)
MCHC RBC AUTO-ENTMCNC: 33.1 G/DL (ref 30–36.5)
MCHC RBC AUTO-ENTMCNC: 33.2 G/DL (ref 30–36.5)
MCHC RBC AUTO-ENTMCNC: 33.3 G/DL (ref 30–36.5)
MCHC RBC AUTO-ENTMCNC: 33.8 G/DL (ref 30–36.5)
MCHC RBC AUTO-ENTMCNC: 34.4 G/DL (ref 30–36.5)
MCHC RBC AUTO-ENTMCNC: 36.1 G/DL (ref 31.5–35.7)
MCHC RBC AUTO-ENTMCNC: 36.2 G/DL (ref 31.5–35.7)
MCV RBC AUTO: 109 FL (ref 79–97)
MCV RBC AUTO: 111 FL (ref 79–97)
MCV RBC AUTO: 120.1 FL (ref 80–99)
MCV RBC AUTO: 120.5 FL (ref 80–99)
MCV RBC AUTO: 121.6 FL (ref 80–99)
MCV RBC AUTO: 121.8 FL (ref 80–99)
MCV RBC AUTO: 122 FL (ref 80–99)
MCV RBC AUTO: 123 FL (ref 80–99)
MCV RBC AUTO: 125.3 FL (ref 80–99)
MESOTHL CELL NFR FLD: 4 %
METHADONE UR QL: NEGATIVE
MITOGEN IGNF BLD-ACNC: 0.5 IU/ML
MONOCYTES # BLD AUTO: 0.5 X10E3/UL (ref 0.1–0.9)
MONOCYTES # BLD AUTO: 0.8 X10E3/UL (ref 0.1–0.9)
MONOCYTES # BLD: 0.6 K/UL (ref 0–1)
MONOCYTES # BLD: 0.8 K/UL (ref 0–1)
MONOCYTES # BLD: 0.9 K/UL (ref 0–1)
MONOCYTES # BLD: 1 K/UL (ref 0–1)
MONOCYTES # BLD: 1 K/UL (ref 0–1)
MONOCYTES # BLD: 1.1 K/UL (ref 0–1)
MONOCYTES # BLD: 1.6 K/UL (ref 0–1)
MONOCYTES NFR BLD AUTO: 7 %
MONOCYTES NFR BLD AUTO: 9 %
MONOCYTES NFR BLD: 10 % (ref 5–13)
MONOCYTES NFR BLD: 5 % (ref 5–13)
MONOCYTES NFR BLD: 7 % (ref 5–13)
MONOCYTES NFR BLD: 7 % (ref 5–13)
MONOCYTES NFR BLD: 8 % (ref 5–13)
MONOCYTES NFR BLD: 9 % (ref 5–13)
MONOCYTES NFR BLD: 9 % (ref 5–13)
MONOS+MACROS NFR FLD: 47 %
MORPHOLOGY BLD-IMP: ABNORMAL
MORPHOLOGY BLD-IMP: ABNORMAL
MYELOCYTES NFR BLD MANUAL: 1 %
NEUTROPHILS # BLD AUTO: 4.9 X10E3/UL (ref 1.4–7)
NEUTROPHILS # BLD AUTO: 6.3 X10E3/UL (ref 1.4–7)
NEUTROPHILS NFR BLD AUTO: 74 %
NEUTROPHILS NFR BLD AUTO: 77 %
NEUTROPHILS NFR FLD: 14 %
NEUTS BAND NFR BLD MANUAL: 4 % (ref 0–6)
NEUTS BAND NFR BLD MANUAL: 5 % (ref 0–6)
NEUTS SEG # BLD: 10.7 K/UL (ref 1.8–8)
NEUTS SEG # BLD: 10.9 K/UL (ref 1.8–8)
NEUTS SEG # BLD: 13.1 K/UL (ref 1.8–8)
NEUTS SEG # BLD: 15.5 K/UL (ref 1.8–8)
NEUTS SEG # BLD: 7.8 K/UL (ref 1.8–8)
NEUTS SEG # BLD: 8.1 K/UL (ref 1.8–8)
NEUTS SEG # BLD: 8.5 K/UL (ref 1.8–8)
NEUTS SEG NFR BLD: 73 % (ref 32–75)
NEUTS SEG NFR BLD: 75 % (ref 32–75)
NEUTS SEG NFR BLD: 80 % (ref 32–75)
NEUTS SEG NFR BLD: 84 % (ref 32–75)
NEUTS SEG NFR BLD: 86 % (ref 32–75)
NEUTS SEG NFR BLD: 88 % (ref 32–75)
NEUTS SEG NFR BLD: 89 % (ref 32–75)
NITRITE UR QL STRIP.AUTO: NEGATIVE
NRBC # BLD: 0 K/UL (ref 0–0.01)
NRBC # BLD: 0 K/UL (ref 0–0.01)
NRBC # BLD: 0.02 K/UL (ref 0–0.01)
NRBC # BLD: 0.03 K/UL (ref 0–0.01)
NRBC # BLD: 0.03 K/UL (ref 0–0.01)
NRBC BLD-RTO: 0 PER 100 WBC
NRBC BLD-RTO: 0 PER 100 WBC
NRBC BLD-RTO: 0.1 PER 100 WBC
NRBC BLD-RTO: 0.2 PER 100 WBC
NRBC BLD-RTO: 0.3 PER 100 WBC
NUC CELL # FLD: 150 /CU MM
O2/TOTAL GAS SETTING VFR VENT: 100 %
O2/TOTAL GAS SETTING VFR VENT: 21 %
OPIATES BLD QL SCN: NEGATIVE NG/ML
OPIATES UR QL: NEGATIVE
OXYCODONES, 738315: NEGATIVE NG/ML
P-R INTERVAL, ECG05: 126 MS
P-R INTERVAL, ECG05: 130 MS
P-R INTERVAL, ECG05: 132 MS
PCO2 BLD: 25.9 MMHG (ref 35–45)
PCO2 BLD: 26.4 MMHG (ref 35–45)
PCP BLD QL SCN: NEGATIVE NG/ML
PCP UR QL: NEGATIVE
PH BLD: 7.55 [PH] (ref 7.35–7.45)
PH BLD: 7.55 [PH] (ref 7.35–7.45)
PH UR STRIP: 5.5 [PH] (ref 5–8)
PHOSPHATE SERPL-MCNC: 2.3 MG/DL (ref 2.6–4.7)
PHOSPHATE SERPL-MCNC: 2.6 MG/DL (ref 2.8–4.1)
PHOSPHATIDYLETHANOL(PETH): NEGATIVE NG/ML
PLATELET # BLD AUTO: 36 K/UL (ref 150–400)
PLATELET # BLD AUTO: 39 K/UL (ref 150–400)
PLATELET # BLD AUTO: 50 K/UL (ref 150–400)
PLATELET # BLD AUTO: 54 K/UL (ref 150–400)
PLATELET # BLD AUTO: 63 K/UL (ref 150–400)
PLATELET # BLD AUTO: 73 X10E3/UL (ref 150–450)
PLATELET # BLD AUTO: 77 K/UL (ref 150–400)
PLATELET # BLD AUTO: 78 X10E3/UL (ref 150–450)
PLATELET # BLD AUTO: 79 K/UL (ref 150–400)
PLATELET COMMENTS,PCOM: ABNORMAL
PMV BLD AUTO: 10.2 FL (ref 8.9–12.9)
PMV BLD AUTO: 10.4 FL (ref 8.9–12.9)
PMV BLD AUTO: 11.1 FL (ref 8.9–12.9)
PMV BLD AUTO: 11.2 FL (ref 8.9–12.9)
PMV BLD AUTO: 11.3 FL (ref 8.9–12.9)
PMV BLD AUTO: 11.9 FL (ref 8.9–12.9)
PMV BLD AUTO: 12.1 FL (ref 8.9–12.9)
PO2 BLD: 106 MMHG (ref 80–100)
PO2 BLD: 81 MMHG (ref 80–100)
POTASSIUM SERPL-SCNC: 3.3 MMOL/L (ref 3.5–5.2)
POTASSIUM SERPL-SCNC: 3.3 MMOL/L (ref 3.5–5.2)
POTASSIUM SERPL-SCNC: 3.7 MMOL/L (ref 3.5–5.1)
POTASSIUM SERPL-SCNC: 3.8 MMOL/L (ref 3.5–5.1)
POTASSIUM SERPL-SCNC: 3.8 MMOL/L (ref 3.5–5.1)
POTASSIUM SERPL-SCNC: 4.1 MMOL/L (ref 3.5–5.1)
POTASSIUM SERPL-SCNC: 4.2 MMOL/L (ref 3.5–5.1)
POTASSIUM SERPL-SCNC: 4.3 MMOL/L (ref 3.5–5.1)
POTASSIUM SERPL-SCNC: 5 MMOL/L (ref 3.5–5.1)
PROT SERPL-MCNC: 5.3 G/DL (ref 6.4–8.2)
PROT SERPL-MCNC: 5.8 G/DL (ref 6.4–8.2)
PROT SERPL-MCNC: 6 G/DL (ref 6–8.5)
PROT SERPL-MCNC: 6.6 G/DL (ref 6.4–8.2)
PROT SERPL-MCNC: 6.8 G/DL (ref 6–8.5)
PROT UR STRIP-MCNC: NEGATIVE MG/DL
PROTHROMBIN TIME: 20.3 SEC (ref 9–11.1)
PROTHROMBIN TIME: 20.9 SEC (ref 9.1–12)
PROTHROMBIN TIME: 21.1 SEC (ref 9–11.1)
PROTHROMBIN TIME: 24.5 SEC (ref 9.1–12)
PSA FREE MFR SERPL: >10 %
PSA FREE SERPL-MCNC: 0.01 NG/ML
PSA SERPL-MCNC: <0.1 NG/ML (ref 0–4)
Q-T INTERVAL, ECG07: 356 MS
Q-T INTERVAL, ECG07: 368 MS
Q-T INTERVAL, ECG07: 370 MS
QRS DURATION, ECG06: 80 MS
QRS DURATION, ECG06: 82 MS
QRS DURATION, ECG06: 86 MS
QTC CALCULATION (BEZET), ECG08: 470 MS
QTC CALCULATION (BEZET), ECG08: 514 MS
QTC CALCULATION (BEZET), ECG08: 517 MS
QUANTIFERON INCUBATION, QF1T: ABNORMAL
QUANTIFERON TB2 AG: 0.01 IU/ML
RBC # BLD AUTO: 1.7 M/UL (ref 4.1–5.7)
RBC # BLD AUTO: 1.76 X10E6/UL (ref 4.14–5.8)
RBC # BLD AUTO: 1.89 M/UL (ref 4.1–5.7)
RBC # BLD AUTO: 1.9 M/UL (ref 4.1–5.7)
RBC # BLD AUTO: 1.9 M/UL (ref 4.1–5.7)
RBC # BLD AUTO: 1.94 M/UL (ref 4.1–5.7)
RBC # BLD AUTO: 2.09 M/UL (ref 4.1–5.7)
RBC # BLD AUTO: 2.35 M/UL (ref 4.1–5.7)
RBC # BLD AUTO: 2.85 X10E6/UL (ref 4.14–5.8)
RBC # FLD: >100 /CU MM
RBC #/AREA URNS HPF: ABNORMAL /HPF (ref 0–5)
RBC MORPH BLD: ABNORMAL
RH BLD: POSITIVE
SALICYLATES SERPL-MCNC: <1.7 MG/DL (ref 2.8–20)
SAMPLES BEING HELD,HOLD: NORMAL
SAMPLES BEING HELD,HOLD: NORMAL
SAO2 % BLD: 97.5 % (ref 92–97)
SAO2 % BLD: 98.8 % (ref 92–97)
SERVICE CMNT-IMP: ABNORMAL
SERVICE CMNT-IMP: NORMAL
SMA IGG SER-ACNC: 16 UNITS (ref 0–19)
SODIUM SERPL-SCNC: 131 MMOL/L (ref 136–145)
SODIUM SERPL-SCNC: 132 MMOL/L (ref 136–145)
SODIUM SERPL-SCNC: 133 MMOL/L (ref 136–145)
SODIUM SERPL-SCNC: 134 MMOL/L (ref 136–145)
SODIUM SERPL-SCNC: 134 MMOL/L (ref 136–145)
SODIUM SERPL-SCNC: 136 MMOL/L (ref 134–144)
SODIUM SERPL-SCNC: 136 MMOL/L (ref 134–144)
SP GR UR REFRACTOMETRY: 1.02 (ref 1–1.03)
SPECIMEN EXP DATE BLD: NORMAL
SPECIMEN SOURCE FLD: ABNORMAL
SPECIMEN SOURCE FLD: NORMAL
SPECIMEN STATUS REPORT, ROLRST: NORMAL
SPECIMEN TYPE: ABNORMAL
SPECIMEN TYPE: ABNORMAL
STRESS BASELINE DIAS BP: 74 MMHG
STRESS BASELINE HR: 104 BPM
STRESS BASELINE ST DEPRESSION: 0 MM
STRESS BASELINE SYS BP: 147 MMHG
STRESS ESTIMATED WORKLOAD: 1 METS
STRESS EXERCISE DUR MIN: 3 MIN
STRESS EXERCISE DUR SEC: 0 SEC
STRESS PEAK DIAS BP: 74 MMHG
STRESS PEAK SYS BP: 147 MMHG
STRESS PERCENT HR ACHIEVED: 68 %
STRESS POST PEAK HR: 110 BPM
STRESS RATE PRESSURE PRODUCT: NORMAL BPM*MMHG
STRESS ST DEPRESSION: 0 MM
STRESS STAGE 1 BP: NORMAL MMHG
STRESS STAGE 1 HR: 100 BPM
STRESS STAGE RECOVERY 1 BP: NORMAL MMHG
STRESS STAGE RECOVERY 1 HR: 100 BPM
STRESS TARGET HR: 161 BPM
T3FREE SERPL-MCNC: 1.7 PG/ML (ref 2–4.4)
T4 FREE SERPL-MCNC: 1.35 NG/DL (ref 0.82–1.77)
THERAPEUTIC RANGE,PTTT: ABNORMAL SECS (ref 58–77)
TIBC SERPL-MCNC: 187 UG/DL (ref 250–450)
TIBC SERPL-MCNC: 218 UG/DL (ref 250–450)
TREPONEMA PALLIDUM IGG+IGM AB [PRESENCE] IN SERUM OR PLASMA BY IMMUNOASSAY: NON REACTIVE
TRIGL SERPL-MCNC: 73 MG/DL (ref ?–150)
TRIGL SERPL-MCNC: 87 MG/DL (ref 0–149)
TROPONIN-HIGH SENSITIVITY: 21 NG/L (ref 0–76)
TROPONIN-HIGH SENSITIVITY: 24 NG/L (ref 0–76)
TSH SERPL DL<=0.005 MIU/L-ACNC: 1.65 UIU/ML (ref 0.45–4.5)
TSH SERPL DL<=0.05 MIU/L-ACNC: 0.67 UIU/ML (ref 0.36–3.74)
UIBC SERPL-MCNC: 25 UG/DL (ref 111–343)
UROBILINOGEN UR QL STRIP.AUTO: 1 EU/DL (ref 0.2–1)
VENTRICULAR RATE, ECG03: 105 BPM
VENTRICULAR RATE, ECG03: 116 BPM
VENTRICULAR RATE, ECG03: 119 BPM
VIT B12 SERPL-MCNC: 1545 PG/ML (ref 193–986)
VLDLC SERPL CALC-MCNC: 14.6 MG/DL
VLDLC SERPL CALC-MCNC: 17 MG/DL (ref 5–40)
VZV IGG SER IA-ACNC: 1512 INDEX
WBC # BLD AUTO: 11 K/UL (ref 4.1–11.1)
WBC # BLD AUTO: 12.3 K/UL (ref 4.1–11.1)
WBC # BLD AUTO: 13.4 K/UL (ref 4.1–11.1)
WBC # BLD AUTO: 15 K/UL (ref 4.1–11.1)
WBC # BLD AUTO: 18.3 K/UL (ref 4.1–11.1)
WBC # BLD AUTO: 6.5 X10E3/UL (ref 3.4–10.8)
WBC # BLD AUTO: 8.2 X10E3/UL (ref 3.4–10.8)
WBC # BLD AUTO: 9.5 K/UL (ref 4.1–11.1)
WBC # BLD AUTO: 9.8 K/UL (ref 4.1–11.1)
WBC URNS QL MICRO: ABNORMAL /HPF (ref 0–4)

## 2022-01-01 PROCEDURE — 96361 HYDRATE IV INFUSION ADD-ON: CPT

## 2022-01-01 PROCEDURE — 70450 CT HEAD/BRAIN W/O DYE: CPT

## 2022-01-01 PROCEDURE — 36600 WITHDRAWAL OF ARTERIAL BLOOD: CPT

## 2022-01-01 PROCEDURE — 87040 BLOOD CULTURE FOR BACTERIA: CPT

## 2022-01-01 PROCEDURE — 74011000250 HC RX REV CODE- 250: Performed by: INTERNAL MEDICINE

## 2022-01-01 PROCEDURE — 93005 ELECTROCARDIOGRAM TRACING: CPT

## 2022-01-01 PROCEDURE — 81001 URINALYSIS AUTO W/SCOPE: CPT

## 2022-01-01 PROCEDURE — 77030020847 HC STATLOK BARD -A

## 2022-01-01 PROCEDURE — 85025 COMPLETE CBC W/AUTO DIFF WBC: CPT

## 2022-01-01 PROCEDURE — 74011000258 HC RX REV CODE- 258: Performed by: INTERNAL MEDICINE

## 2022-01-01 PROCEDURE — 83735 ASSAY OF MAGNESIUM: CPT

## 2022-01-01 PROCEDURE — 93306 TTE W/DOPPLER COMPLETE: CPT

## 2022-01-01 PROCEDURE — 74011250636 HC RX REV CODE- 250/636: Performed by: INTERNAL MEDICINE

## 2022-01-01 PROCEDURE — 85610 PROTHROMBIN TIME: CPT

## 2022-01-01 PROCEDURE — 97116 GAIT TRAINING THERAPY: CPT

## 2022-01-01 PROCEDURE — 74011250636 HC RX REV CODE- 250/636: Performed by: NURSE ANESTHETIST, CERTIFIED REGISTERED

## 2022-01-01 PROCEDURE — 74011250636 HC RX REV CODE- 250/636: Performed by: EMERGENCY MEDICINE

## 2022-01-01 PROCEDURE — 84100 ASSAY OF PHOSPHORUS: CPT

## 2022-01-01 PROCEDURE — 74011250637 HC RX REV CODE- 250/637: Performed by: INTERNAL MEDICINE

## 2022-01-01 PROCEDURE — 02HV33Z INSERTION OF INFUSION DEVICE INTO SUPERIOR VENA CAVA, PERCUTANEOUS APPROACH: ICD-10-PCS | Performed by: INTERNAL MEDICINE

## 2022-01-01 PROCEDURE — 84443 ASSAY THYROID STIM HORMONE: CPT

## 2022-01-01 PROCEDURE — 36415 COLL VENOUS BLD VENIPUNCTURE: CPT

## 2022-01-01 PROCEDURE — 85730 THROMBOPLASTIN TIME PARTIAL: CPT

## 2022-01-01 PROCEDURE — 93321 DOPPLER ECHO F-UP/LMTD STD: CPT | Performed by: SPECIALIST

## 2022-01-01 PROCEDURE — 80053 COMPREHEN METABOLIC PANEL: CPT

## 2022-01-01 PROCEDURE — 80048 BASIC METABOLIC PNL TOTAL CA: CPT

## 2022-01-01 PROCEDURE — 83605 ASSAY OF LACTIC ACID: CPT

## 2022-01-01 PROCEDURE — 65660000001 HC RM ICU INTERMED STEPDOWN

## 2022-01-01 PROCEDURE — 87086 URINE CULTURE/COLONY COUNT: CPT

## 2022-01-01 PROCEDURE — 99285 EMERGENCY DEPT VISIT HI MDM: CPT

## 2022-01-01 PROCEDURE — 82728 ASSAY OF FERRITIN: CPT

## 2022-01-01 PROCEDURE — 93308 TTE F-UP OR LMTD: CPT

## 2022-01-01 PROCEDURE — A9577 INJ MULTIHANCE: HCPCS | Performed by: INTERNAL MEDICINE

## 2022-01-01 PROCEDURE — 83690 ASSAY OF LIPASE: CPT

## 2022-01-01 PROCEDURE — 85379 FIBRIN DEGRADATION QUANT: CPT

## 2022-01-01 PROCEDURE — 93325 DOPPLER ECHO COLOR FLOW MAPG: CPT | Performed by: SPECIALIST

## 2022-01-01 PROCEDURE — 87186 SC STD MICRODIL/AGAR DIL: CPT

## 2022-01-01 PROCEDURE — 74011000250 HC RX REV CODE- 250: Performed by: NURSE ANESTHETIST, CERTIFIED REGISTERED

## 2022-01-01 PROCEDURE — 99232 SBSQ HOSP IP/OBS MODERATE 35: CPT | Performed by: INTERNAL MEDICINE

## 2022-01-01 PROCEDURE — 76700 US EXAM ABDOM COMPLETE: CPT

## 2022-01-01 PROCEDURE — 87077 CULTURE AEROBIC IDENTIFY: CPT

## 2022-01-01 PROCEDURE — 86140 C-REACTIVE PROTEIN: CPT

## 2022-01-01 PROCEDURE — 97161 PT EVAL LOW COMPLEX 20 MIN: CPT | Performed by: PHYSICAL THERAPIST

## 2022-01-01 PROCEDURE — 76937 US GUIDE VASCULAR ACCESS: CPT

## 2022-01-01 PROCEDURE — 80179 DRUG ASSAY SALICYLATE: CPT

## 2022-01-01 PROCEDURE — 99223 1ST HOSP IP/OBS HIGH 75: CPT | Performed by: INTERNAL MEDICINE

## 2022-01-01 PROCEDURE — 74011000250 HC RX REV CODE- 250: Performed by: EMERGENCY MEDICINE

## 2022-01-01 PROCEDURE — 83880 ASSAY OF NATRIURETIC PEPTIDE: CPT

## 2022-01-01 PROCEDURE — 82042 OTHER SOURCE ALBUMIN QUAN EA: CPT

## 2022-01-01 PROCEDURE — 76060000031 HC ANESTHESIA FIRST 0.5 HR: Performed by: INTERNAL MEDICINE

## 2022-01-01 PROCEDURE — 97110 THERAPEUTIC EXERCISES: CPT

## 2022-01-01 PROCEDURE — 99215 OFFICE O/P EST HI 40 MIN: CPT | Performed by: INTERNAL MEDICINE

## 2022-01-01 PROCEDURE — 80307 DRUG TEST PRSMV CHEM ANLYZR: CPT

## 2022-01-01 PROCEDURE — 49083 ABD PARACENTESIS W/IMAGING: CPT

## 2022-01-01 PROCEDURE — C1751 CATH, INF, PER/CENT/MIDLINE: HCPCS

## 2022-01-01 PROCEDURE — 43235 EGD DIAGNOSTIC BRUSH WASH: CPT | Performed by: INTERNAL MEDICINE

## 2022-01-01 PROCEDURE — 82607 VITAMIN B-12: CPT

## 2022-01-01 PROCEDURE — 93308 TTE F-UP OR LMTD: CPT | Performed by: SPECIALIST

## 2022-01-01 PROCEDURE — 71046 X-RAY EXAM CHEST 2 VIEWS: CPT

## 2022-01-01 PROCEDURE — 36573 INSJ PICC RS&I 5 YR+: CPT | Performed by: NURSE PRACTITIONER

## 2022-01-01 PROCEDURE — 89050 BODY FLUID CELL COUNT: CPT

## 2022-01-01 PROCEDURE — 82803 BLOOD GASES ANY COMBINATION: CPT

## 2022-01-01 PROCEDURE — 80143 DRUG ASSAY ACETAMINOPHEN: CPT

## 2022-01-01 PROCEDURE — 71260 CT THORAX DX C+: CPT

## 2022-01-01 PROCEDURE — 74011000250 HC RX REV CODE- 250: Performed by: NURSE PRACTITIONER

## 2022-01-01 PROCEDURE — 77030020365 HC SOL INJ SOD CL 0.9% 50ML

## 2022-01-01 PROCEDURE — 74176 CT ABD & PELVIS W/O CONTRAST: CPT

## 2022-01-01 PROCEDURE — 99222 1ST HOSP IP/OBS MODERATE 55: CPT | Performed by: SURGERY

## 2022-01-01 PROCEDURE — A9500 TC99M SESTAMIBI: HCPCS

## 2022-01-01 PROCEDURE — 82140 ASSAY OF AMMONIA: CPT

## 2022-01-01 PROCEDURE — 94761 N-INVAS EAR/PLS OXIMETRY MLT: CPT

## 2022-01-01 PROCEDURE — 96374 THER/PROPH/DIAG INJ IV PUSH: CPT

## 2022-01-01 PROCEDURE — 2709999900 HC NON-CHARGEABLE SUPPLY: Performed by: INTERNAL MEDICINE

## 2022-01-01 PROCEDURE — 80061 LIPID PANEL: CPT

## 2022-01-01 PROCEDURE — 84484 ASSAY OF TROPONIN QUANT: CPT

## 2022-01-01 PROCEDURE — 86900 BLOOD TYPING SEROLOGIC ABO: CPT

## 2022-01-01 PROCEDURE — 97535 SELF CARE MNGMENT TRAINING: CPT

## 2022-01-01 PROCEDURE — 97116 GAIT TRAINING THERAPY: CPT | Performed by: PHYSICAL THERAPIST

## 2022-01-01 PROCEDURE — 74011250636 HC RX REV CODE- 250/636: Performed by: NURSE PRACTITIONER

## 2022-01-01 PROCEDURE — 71045 X-RAY EXAM CHEST 1 VIEW: CPT

## 2022-01-01 PROCEDURE — 99205 OFFICE O/P NEW HI 60 MIN: CPT | Performed by: INTERNAL MEDICINE

## 2022-01-01 PROCEDURE — 74011000636 HC RX REV CODE- 636

## 2022-01-01 PROCEDURE — 82746 ASSAY OF FOLIC ACID SERUM: CPT

## 2022-01-01 PROCEDURE — 97165 OT EVAL LOW COMPLEX 30 MIN: CPT

## 2022-01-01 PROCEDURE — 83540 ASSAY OF IRON: CPT

## 2022-01-01 PROCEDURE — 93017 CV STRESS TEST TRACING ONLY: CPT

## 2022-01-01 PROCEDURE — 74183 MRI ABD W/O CNTR FLWD CNTR: CPT

## 2022-01-01 PROCEDURE — 82077 ASSAY SPEC XCP UR&BREATH IA: CPT

## 2022-01-01 PROCEDURE — 94010 BREATHING CAPACITY TEST: CPT

## 2022-01-01 PROCEDURE — 76040000019: Performed by: INTERNAL MEDICINE

## 2022-01-01 RX ORDER — METHYLPREDNISOLONE 32 MG/1
TABLET ORAL
Qty: 2 TABLET | Refills: 0 | Status: SHIPPED | OUTPATIENT
Start: 2022-01-01 | End: 2022-01-01

## 2022-01-01 RX ORDER — LIDOCAINE HYDROCHLORIDE 10 MG/ML
8 INJECTION, SOLUTION EPIDURAL; INFILTRATION; INTRACAUDAL; PERINEURAL
Status: COMPLETED | OUTPATIENT
Start: 2022-01-01 | End: 2022-01-01

## 2022-01-01 RX ORDER — LACTULOSE 10 G/15ML
20 SOLUTION ORAL; RECTAL DAILY
Qty: 900 ML | Refills: 2 | Status: SHIPPED | OUTPATIENT
Start: 2022-01-01

## 2022-01-01 RX ORDER — VANCOMYCIN/0.9 % SOD CHLORIDE 750 MG/250
750 PLASTIC BAG, INJECTION (ML) INTRAVENOUS EVERY 12 HOURS
Status: DISCONTINUED | OUTPATIENT
Start: 2022-01-01 | End: 2022-01-01

## 2022-01-01 RX ORDER — LIDOCAINE HYDROCHLORIDE 20 MG/ML
INJECTION, SOLUTION EPIDURAL; INFILTRATION; INTRACAUDAL; PERINEURAL AS NEEDED
Status: DISCONTINUED | OUTPATIENT
Start: 2022-01-01 | End: 2022-01-01 | Stop reason: HOSPADM

## 2022-01-01 RX ORDER — TETRAKIS(2-METHOXYISOBUTYLISOCYANIDE)COPPER(I) TETRAFLUOROBORATE 1 MG/ML
33 INJECTION, POWDER, LYOPHILIZED, FOR SOLUTION INTRAVENOUS
Status: COMPLETED | OUTPATIENT
Start: 2022-01-01 | End: 2022-01-01

## 2022-01-01 RX ORDER — PANTOPRAZOLE SODIUM 40 MG/1
40 TABLET, DELAYED RELEASE ORAL DAILY
Status: DISCONTINUED | OUTPATIENT
Start: 2022-01-01 | End: 2022-01-01 | Stop reason: HOSPADM

## 2022-01-01 RX ORDER — SODIUM CHLORIDE 0.9 % (FLUSH) 0.9 %
5-40 SYRINGE (ML) INJECTION AS NEEDED
Status: DISCONTINUED | OUTPATIENT
Start: 2022-01-01 | End: 2022-01-01 | Stop reason: HOSPADM

## 2022-01-01 RX ORDER — EPINEPHRINE 0.1 MG/ML
1 INJECTION INTRACARDIAC; INTRAVENOUS
Status: DISCONTINUED | OUTPATIENT
Start: 2022-01-01 | End: 2022-01-01 | Stop reason: HOSPADM

## 2022-01-01 RX ORDER — SODIUM CHLORIDE 0.9 % (FLUSH) 0.9 %
20 SYRINGE (ML) INJECTION
Status: COMPLETED | OUTPATIENT
Start: 2022-01-01 | End: 2022-01-01

## 2022-01-01 RX ORDER — CITALOPRAM 20 MG/1
10 TABLET, FILM COATED ORAL DAILY
Status: DISCONTINUED | OUTPATIENT
Start: 2022-01-01 | End: 2022-01-01 | Stop reason: HOSPADM

## 2022-01-01 RX ORDER — ONDANSETRON 4 MG/1
4 TABLET, ORALLY DISINTEGRATING ORAL
Status: DISCONTINUED | OUTPATIENT
Start: 2022-01-01 | End: 2022-01-01 | Stop reason: HOSPADM

## 2022-01-01 RX ORDER — FUROSEMIDE 40 MG/1
40 TABLET ORAL DAILY
Qty: 90 TABLET | Refills: 3 | Status: SHIPPED | OUTPATIENT
Start: 2022-01-01

## 2022-01-01 RX ORDER — SODIUM CHLORIDE 9 MG/ML
INJECTION, SOLUTION INTRAVENOUS
Status: DISCONTINUED | OUTPATIENT
Start: 2022-01-01 | End: 2022-01-01 | Stop reason: HOSPADM

## 2022-01-01 RX ORDER — POLYETHYLENE GLYCOL 3350 17 G/17G
17 POWDER, FOR SOLUTION ORAL DAILY PRN
Status: DISCONTINUED | OUTPATIENT
Start: 2022-01-01 | End: 2022-01-01 | Stop reason: HOSPADM

## 2022-01-01 RX ORDER — SODIUM CHLORIDE, SODIUM LACTATE, POTASSIUM CHLORIDE, CALCIUM CHLORIDE 600; 310; 30; 20 MG/100ML; MG/100ML; MG/100ML; MG/100ML
75 INJECTION, SOLUTION INTRAVENOUS CONTINUOUS
Status: DISCONTINUED | OUTPATIENT
Start: 2022-01-01 | End: 2022-01-01 | Stop reason: HOSPADM

## 2022-01-01 RX ORDER — PANTOPRAZOLE SODIUM 20 MG/1
40 TABLET, DELAYED RELEASE ORAL DAILY
Qty: 90 TABLET | Refills: 1 | Status: SHIPPED | OUTPATIENT
Start: 2022-01-01

## 2022-01-01 RX ORDER — FLUMAZENIL 0.1 MG/ML
0.2 INJECTION INTRAVENOUS
Status: DISCONTINUED | OUTPATIENT
Start: 2022-01-01 | End: 2022-01-01 | Stop reason: HOSPADM

## 2022-01-01 RX ORDER — SODIUM CHLORIDE 0.9 % (FLUSH) 0.9 %
5-40 SYRINGE (ML) INJECTION EVERY 8 HOURS
Status: DISCONTINUED | OUTPATIENT
Start: 2022-01-01 | End: 2022-01-01 | Stop reason: HOSPADM

## 2022-01-01 RX ORDER — FENTANYL CITRATE 50 UG/ML
50-200 INJECTION, SOLUTION INTRAMUSCULAR; INTRAVENOUS
Status: DISCONTINUED | OUTPATIENT
Start: 2022-01-01 | End: 2022-01-01 | Stop reason: HOSPADM

## 2022-01-01 RX ORDER — DEXTROMETHORPHAN/PSEUDOEPHED 2.5-7.5/.8
1.2 DROPS ORAL
Status: DISCONTINUED | OUTPATIENT
Start: 2022-01-01 | End: 2022-01-01 | Stop reason: HOSPADM

## 2022-01-01 RX ORDER — PROPOFOL 10 MG/ML
INJECTION, EMULSION INTRAVENOUS AS NEEDED
Status: DISCONTINUED | OUTPATIENT
Start: 2022-01-01 | End: 2022-01-01 | Stop reason: HOSPADM

## 2022-01-01 RX ORDER — CITALOPRAM 10 MG/1
TABLET ORAL
COMMUNITY
Start: 2022-01-01

## 2022-01-01 RX ORDER — VANCOMYCIN 2 GRAM/500 ML IN 0.9 % SODIUM CHLORIDE INTRAVENOUS
2000 ONCE
Status: COMPLETED | OUTPATIENT
Start: 2022-01-01 | End: 2022-01-01

## 2022-01-01 RX ORDER — SODIUM CHLORIDE 0.9 % (FLUSH) 0.9 %
5-10 SYRINGE (ML) INJECTION AS NEEDED
Status: DISCONTINUED | OUTPATIENT
Start: 2022-01-01 | End: 2022-01-01 | Stop reason: HOSPADM

## 2022-01-01 RX ORDER — MIDAZOLAM HYDROCHLORIDE 1 MG/ML
5-10 INJECTION, SOLUTION INTRAMUSCULAR; INTRAVENOUS
Status: DISCONTINUED | OUTPATIENT
Start: 2022-01-01 | End: 2022-01-01 | Stop reason: HOSPADM

## 2022-01-01 RX ORDER — ALBUMIN HUMAN 250 G/1000ML
25 SOLUTION INTRAVENOUS ONCE
Status: DISCONTINUED | OUTPATIENT
Start: 2022-01-01 | End: 2022-01-01

## 2022-01-01 RX ORDER — NALOXONE HYDROCHLORIDE 0.4 MG/ML
0.4 INJECTION, SOLUTION INTRAMUSCULAR; INTRAVENOUS; SUBCUTANEOUS
Status: DISCONTINUED | OUTPATIENT
Start: 2022-01-01 | End: 2022-01-01 | Stop reason: HOSPADM

## 2022-01-01 RX ORDER — LEVOCETIRIZINE DIHYDROCHLORIDE 5 MG/1
5 TABLET, FILM COATED ORAL
COMMUNITY

## 2022-01-01 RX ORDER — ONDANSETRON 2 MG/ML
4 INJECTION INTRAMUSCULAR; INTRAVENOUS
Status: DISCONTINUED | OUTPATIENT
Start: 2022-01-01 | End: 2022-01-01 | Stop reason: HOSPADM

## 2022-01-01 RX ORDER — SPIRONOLACTONE 100 MG/1
100 TABLET, FILM COATED ORAL DAILY
Qty: 90 TABLET | Refills: 3 | Status: SHIPPED | OUTPATIENT
Start: 2022-01-01

## 2022-01-01 RX ORDER — ATROPINE SULFATE 0.1 MG/ML
0.5 INJECTION INTRAVENOUS
Status: DISCONTINUED | OUTPATIENT
Start: 2022-01-01 | End: 2022-01-01 | Stop reason: HOSPADM

## 2022-01-01 RX ORDER — TETRAKIS(2-METHOXYISOBUTYLISOCYANIDE)COPPER(I) TETRAFLUOROBORATE 1 MG/ML
10.9 INJECTION, POWDER, LYOPHILIZED, FOR SOLUTION INTRAVENOUS
Status: COMPLETED | OUTPATIENT
Start: 2022-01-01 | End: 2022-01-01

## 2022-01-01 RX ORDER — SODIUM CHLORIDE 9 MG/ML
50 INJECTION, SOLUTION INTRAVENOUS CONTINUOUS
Status: DISCONTINUED | OUTPATIENT
Start: 2022-01-01 | End: 2022-01-01 | Stop reason: HOSPADM

## 2022-01-01 RX ADMIN — SODIUM CHLORIDE 1000 ML: 9 INJECTION, SOLUTION INTRAVENOUS at 04:25

## 2022-01-01 RX ADMIN — Medication 10 ML: at 22:14

## 2022-01-01 RX ADMIN — TETRAKIS(2-METHOXYISOBUTYLISOCYANIDE)COPPER(I) TETRAFLUOROBORATE 10.9 MILLICURIE: 1 INJECTION, POWDER, LYOPHILIZED, FOR SOLUTION INTRAVENOUS at 08:00

## 2022-01-01 RX ADMIN — CEFTRIAXONE SODIUM 2 G: 2 INJECTION, POWDER, FOR SOLUTION INTRAMUSCULAR; INTRAVENOUS at 08:21

## 2022-01-01 RX ADMIN — CEFEPIME 2 G: 2 INJECTION, POWDER, FOR SOLUTION INTRAVENOUS at 02:38

## 2022-01-01 RX ADMIN — CITALOPRAM HYDROBROMIDE 10 MG: 20 TABLET ORAL at 08:30

## 2022-01-01 RX ADMIN — SODIUM CHLORIDE 500 ML: 9 INJECTION, SOLUTION INTRAVENOUS at 22:50

## 2022-01-01 RX ADMIN — CEFTRIAXONE SODIUM 2 G: 2 INJECTION, POWDER, FOR SOLUTION INTRAMUSCULAR; INTRAVENOUS at 08:30

## 2022-01-01 RX ADMIN — GADOBENATE DIMEGLUMINE 17 ML: 529 INJECTION, SOLUTION INTRAVENOUS at 13:34

## 2022-01-01 RX ADMIN — CEFEPIME 2 G: 2 INJECTION, POWDER, FOR SOLUTION INTRAVENOUS at 03:15

## 2022-01-01 RX ADMIN — LACTULOSE 30 ML: 20 SOLUTION ORAL at 09:54

## 2022-01-01 RX ADMIN — Medication 1 CAPSULE: at 08:15

## 2022-01-01 RX ADMIN — LACTULOSE 30 ML: 20 SOLUTION ORAL at 08:29

## 2022-01-01 RX ADMIN — VANCOMYCIN HYDROCHLORIDE 750 MG: 750 INJECTION, POWDER, LYOPHILIZED, FOR SOLUTION INTRAVENOUS at 04:31

## 2022-01-01 RX ADMIN — Medication 1 CAPSULE: at 08:30

## 2022-01-01 RX ADMIN — CITALOPRAM HYDROBROMIDE 10 MG: 20 TABLET ORAL at 08:36

## 2022-01-01 RX ADMIN — LACTULOSE 30 ML: 20 SOLUTION ORAL at 10:09

## 2022-01-01 RX ADMIN — SODIUM CHLORIDE 1000 ML: 9 INJECTION, SOLUTION INTRAVENOUS at 04:24

## 2022-01-01 RX ADMIN — REGADENOSON 0.4 MG: 0.08 INJECTION, SOLUTION INTRAVENOUS at 09:47

## 2022-01-01 RX ADMIN — CITALOPRAM HYDROBROMIDE 10 MG: 20 TABLET ORAL at 08:57

## 2022-01-01 RX ADMIN — CEFEPIME 2 G: 2 INJECTION, POWDER, FOR SOLUTION INTRAVENOUS at 16:41

## 2022-01-01 RX ADMIN — CITALOPRAM HYDROBROMIDE 10 MG: 20 TABLET ORAL at 08:15

## 2022-01-01 RX ADMIN — SODIUM CHLORIDE, POTASSIUM CHLORIDE, SODIUM LACTATE AND CALCIUM CHLORIDE 75 ML/HR: 600; 310; 30; 20 INJECTION, SOLUTION INTRAVENOUS at 16:41

## 2022-01-01 RX ADMIN — IOPAMIDOL 80 ML: 755 INJECTION, SOLUTION INTRAVENOUS at 12:15

## 2022-01-01 RX ADMIN — SODIUM CHLORIDE 500 ML: 9 INJECTION, SOLUTION INTRAVENOUS at 01:23

## 2022-01-01 RX ADMIN — Medication 1 CAPSULE: at 09:29

## 2022-01-01 RX ADMIN — SODIUM CHLORIDE 1 G: 9 INJECTION INTRAMUSCULAR; INTRAVENOUS; SUBCUTANEOUS at 01:23

## 2022-01-01 RX ADMIN — Medication 10 ML: at 22:48

## 2022-01-01 RX ADMIN — SODIUM CHLORIDE, POTASSIUM CHLORIDE, SODIUM LACTATE AND CALCIUM CHLORIDE 75 ML/HR: 600; 310; 30; 20 INJECTION, SOLUTION INTRAVENOUS at 23:33

## 2022-01-01 RX ADMIN — PROPOFOL 100 MG: 10 INJECTION, EMULSION INTRAVENOUS at 16:35

## 2022-01-01 RX ADMIN — CEFEPIME 2 G: 2 INJECTION, POWDER, FOR SOLUTION INTRAVENOUS at 17:00

## 2022-01-01 RX ADMIN — CEFEPIME 2 G: 2 INJECTION, POWDER, FOR SOLUTION INTRAVENOUS at 01:55

## 2022-01-01 RX ADMIN — Medication 20 ML: at 09:47

## 2022-01-01 RX ADMIN — PANTOPRAZOLE SODIUM 40 MG: 40 TABLET, DELAYED RELEASE ORAL at 08:57

## 2022-01-01 RX ADMIN — PANTOPRAZOLE SODIUM 40 MG: 40 TABLET, DELAYED RELEASE ORAL at 09:29

## 2022-01-01 RX ADMIN — PANTOPRAZOLE SODIUM 40 MG: 40 TABLET, DELAYED RELEASE ORAL at 08:36

## 2022-01-01 RX ADMIN — PANTOPRAZOLE SODIUM 40 MG: 40 TABLET, DELAYED RELEASE ORAL at 08:30

## 2022-01-01 RX ADMIN — Medication 10 ML: at 20:34

## 2022-01-01 RX ADMIN — Medication 10 ML: at 23:33

## 2022-01-01 RX ADMIN — SODIUM CHLORIDE, POTASSIUM CHLORIDE, SODIUM LACTATE AND CALCIUM CHLORIDE 75 ML/HR: 600; 310; 30; 20 INJECTION, SOLUTION INTRAVENOUS at 11:32

## 2022-01-01 RX ADMIN — Medication 1 CAPSULE: at 08:57

## 2022-01-01 RX ADMIN — SODIUM CHLORIDE, POTASSIUM CHLORIDE, SODIUM LACTATE AND CALCIUM CHLORIDE 125 ML/HR: 600; 310; 30; 20 INJECTION, SOLUTION INTRAVENOUS at 03:04

## 2022-01-01 RX ADMIN — LACTULOSE 30 ML: 20 SOLUTION ORAL at 08:37

## 2022-01-01 RX ADMIN — SODIUM CHLORIDE 526 ML: 9 INJECTION, SOLUTION INTRAVENOUS at 05:46

## 2022-01-01 RX ADMIN — Medication 5 ML: at 14:00

## 2022-01-01 RX ADMIN — VANCOMYCIN HYDROCHLORIDE 2000 MG: 10 INJECTION, POWDER, LYOPHILIZED, FOR SOLUTION INTRAVENOUS at 04:24

## 2022-01-01 RX ADMIN — CEFEPIME 2 G: 2 INJECTION, POWDER, FOR SOLUTION INTRAVENOUS at 08:57

## 2022-01-01 RX ADMIN — SODIUM CHLORIDE: 900 INJECTION, SOLUTION INTRAVENOUS at 16:04

## 2022-01-01 RX ADMIN — TETRAKIS(2-METHOXYISOBUTYLISOCYANIDE)COPPER(I) TETRAFLUOROBORATE 33 MILLICURIE: 1 INJECTION, POWDER, LYOPHILIZED, FOR SOLUTION INTRAVENOUS at 09:48

## 2022-01-01 RX ADMIN — PANTOPRAZOLE SODIUM 40 MG: 40 TABLET, DELAYED RELEASE ORAL at 08:15

## 2022-01-01 RX ADMIN — CEFEPIME 2 G: 2 INJECTION, POWDER, FOR SOLUTION INTRAVENOUS at 09:28

## 2022-01-01 RX ADMIN — LACTULOSE 30 ML: 20 SOLUTION ORAL at 08:15

## 2022-01-01 RX ADMIN — CEFEPIME 2 G: 2 INJECTION, POWDER, FOR SOLUTION INTRAVENOUS at 08:39

## 2022-01-01 RX ADMIN — CITALOPRAM HYDROBROMIDE 10 MG: 20 TABLET ORAL at 09:29

## 2022-01-01 RX ADMIN — LIDOCAINE HYDROCHLORIDE 8 ML: 10 INJECTION, SOLUTION EPIDURAL; INFILTRATION; INTRACAUDAL; PERINEURAL at 12:00

## 2022-01-01 RX ADMIN — CEFEPIME 2 G: 2 INJECTION, POWDER, FOR SOLUTION INTRAVENOUS at 16:42

## 2022-01-01 RX ADMIN — PROPOFOL 100 MG: 10 INJECTION, EMULSION INTRAVENOUS at 16:33

## 2022-01-01 RX ADMIN — VANCOMYCIN HYDROCHLORIDE 750 MG: 750 INJECTION, POWDER, LYOPHILIZED, FOR SOLUTION INTRAVENOUS at 15:53

## 2022-01-01 RX ADMIN — Medication 1 CAPSULE: at 08:36

## 2022-01-01 RX ADMIN — LIDOCAINE HYDROCHLORIDE 100 MG: 20 INJECTION, SOLUTION EPIDURAL; INFILTRATION; INTRACAUDAL; PERINEURAL at 16:33

## 2022-03-15 NOTE — PROGRESS NOTES
[facility], MD, [address], [place], [place]      Tika Bolton, DANNA Osorio, [facility]     Aileen Norwood, [facility]   FIDELINA Elias, [facility]       Elisabeth Cantu [address]    at [address], [address] [facility], [facility] 22.    [phone]    FAX: [address] [place] [address]    at [address], [address]    [phone]    FAX: [phone]       Patient Care Team:  Meche Coffey DO as PCP - General (Family Medicine)  Elissa Beltran MD (Gastroenterology)      Problem List  Date Reviewed: 3/15/2022          Codes Class Noted    Cirrhosis ([facility] ICD-10-CM: K74.60  ICD-9-CM: 571.5  Unknown        Alcoholic liver disease ([facility] ICD-10-CM: K70.9  ICD-9-CM: 571.3  Unknown        Sarcoidosis, lung ([facility] ICD-10-CM: D86.0  ICD-9-CM: 135, 517.8  Unknown        Mitral valve prolapse ICD-10-CM: I34.1  ICD-9-CM: 424.0  Unknown                The clinicians listed above have asked me to see Bari Manzano in consultation regarding management of cirrhosis secondary to alcohol. All medical records sent by the referring physicians were reviewed including imaging studies     The patient is a 59 y.o.  male who was found to have chronic liver disease and cirrhosis in 11/2018 when he was hospitalized with jaundice. An assessment of liver fibrosis with biopsy or elastography has not been performed. Serologic evaluation for markers of chronic liver disease has either not been performed or the results are not available. The patient has not developed any of the major complications of cirrhosis to date.     The patient has the following symptoms which could be attributed to the liver disorder:    Fatigue is slowly improving, yellowing of the eyes or skin,     The patient is not experiencing the following symptoms which are commonly seen in this liver disorder:   problems concentrating,   swelling of the abdomen,   swelling of the lower extremities,   hematemesis,   hematochezia. The patient completes all daily activities without any functional limitations. ASSESSMENT AND PLAN:  Cirrhosis  The diagnosis of cirrhosis is based upon imaging, laboratory studies, complications of cirrhosis. 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 The CTP is 10. Child class C. The MELD score is 24. Alcohol liver disease  The diagnosis is based upon a history of consuming significant alcohol on a daily basis for many years, pattern of AST>ALT, an elevation in ferritin and FE saturation, serology that is negative for other causes of chronic liver disease. The histologic severity has not been defined. The patient has consumed alcoholic in excess for many years. He reduced alcohol use to 6 per day when he found out he liver disease. The patient has been abstinent from alcohol since 11/2021 when he was hospitalized for jaundice. Discussed the need to reman abstinent from alcohol. Elevation in Ferritin  There is an elevation in ferritin with Elevated iron saturation. The patient may have hemochromatosis or be a carrier for an HFE gene. The elevation in ferritin may be secondary to alcohol use and will come down to normal with abstinence. Will order HFE genetic testing. Ascites   Ascites developed for the first time in 3/2022. Ascites is mild, seen on US only and not clinically apparent. The patient was counseled regarding the need to maintain sodium restriction and the types of foods containing high amounts of sodium to be avoided. Screening for Esophageal varices   The patient has not had an EGD to screen for varices.   Will schedule for EGD to assess for varices     Hepatic encephalopathy   Overt HE has not developed to date. There is no reason for treatment with lactulose of xifaxan  There is no need to restrict dietary protein at this time. Thrombocytopenia   This is secondary to cirrhosis. There is no evidence of overt bleeding. No treatment is required. The platelet count is adequate for the patient to undergo procedures without the need for platelet transfusion or platelet growth factors. Anemia   This is due to multifactorial causes including portal hypertension with chronic GI blood loss, bone marrow suppression secondary to alcohol. The most recent FE studies were from 3/2022. The serum ferritin is 937  The FE saturation is 87  Will schedule for EGD to assess for UGI blood loss. Screening for Hepatocellular Carcinoma  HCC screening has not been not been performed   Will perform liver US to screen for [facility] Treatment of other medical problems in patients with chronic liver disease  There are no contraindications for the patient to take most medications that are necessary for treatment of other medical issues. The patient has cirrhrosis and should avoid taking NSAIDs which are associated with a higher rate of developing GERALDINE. The patient can take any medications utilized for treatment of DM, statins to treat hypercholesterolemia    The patient consumes alcohol on a daily basis or has recently stopped consuming alcohol. Regular alcohol use increases the risk of toxicity from acetaminophen. This analgesic should be avoided until the patient has been abstinent from alcohol for 6 months. Osteoporosis  The risk of osteoporosis is increased in patients with cirrhosis. DEXA bone density to assess for osteoporosis has not been performed. This will be scheduled as part of liver transplant evaluation. Vaccinations   Vaccination for viral hepatitis A is not needed. The patient has serologic evidence of prior exposure or vaccination with immunity.     The patient has received 2 doses of COVID-19 vaccine. Routine vaccinations against other bacterial and viral agents can be performed as indicated. Annual flu vaccination should be administered if indicated. ALLERGIES  Allergies   Allergen Reactions    Contrast Agent [Iodine] Itching    Penicillin G Hives     30 years ago        MEDICATIONS  Current Outpatient Medications   Medication Sig    cefUROXime (CEFTIN) 500 mg tablet Take 500 mg by mouth two (2) times a day. No current facility-administered medications for this visit. SYSTEM REVIEW NOT RELATED TO LIVER DISEASE OR REVIEWED ABOVE:  Constitution systems: Negative for fever, chills, weight gain, weight loss. Eyes: Eyes are yellow. ENT: Negative for sore throat, painful swallowing. Respiratory: Negative for cough, hemoptysis, SOB. Cardiology: Negative for chest pain, palpitations. GI:  Negative for constipation or diarrhea. : Negative for urinary frequency, dysuria, hematuria, nocturia. Skin: Negative for rash. Hematology: Negative for easy bruising, blood clots. Musculo-skelatal: Negative for back pain, muscle pain, weakness. Neurologic: Negative for headaches, dizziness, vertigo, memory problems not related to HE. Psychology: Negative for anxiety, depression. FAMILY HISTORY:  The father  of AAA. The mother  of CVA. There is no family history of liver disease. SOCIAL HISTORY:  The patient has never been . The patient has no children. The patient stopped using tobacco products in     The patient had been consuming 1/5 of alcohol per day. He reduced this to 6 alcoholic beverages per day in 2018 when he found out he had liver disease. The patient has been abstinent from alcohol since 2021. The patient used to work as  for the "[employer].         PHYSICAL EXAMINATION:  Visit Vitals  /71 (BP 1 Location: Left upper arm, BP Patient Position: Sitting, BP Cuff Size: Adult)   Pulse 81   Temp (!) 96.7 °F (35.9 °C) (Temporal)   Resp 17   Ht 6' 3\" (1.905 m)   Wt 186 lb (84.4 kg)   SpO2 99%   BMI 23.25 kg/m²     General: No acute distress. Eyes: Sclera icteric. ENT: No oral lesions. Thyroid normal.  Nodes: No adenopathy. Skin: Spider angiomata. Jaundice. Respiratory: Lungs clear to auscultation. Cardiovascular: Regular heart rate. No murmurs. No JVD. Abdomen: Soft non-tender. Liver size normal to percussion/palpation. Spleen not palpable. No obvious ascites. Extremities: No edema. No muscle wasting. No gross arthritic changes. Neurologic: Alert and oriented. Cranial nerves grossly intact. No asterixis. LABORATORY STUDIES:  Liver [place] of [address]  [address] & Units 3/15/2022   WBC 3.4 - 10.8 x10E3/uL 6.5   ANC 1.4 - 7.0 x10E3/uL 4.9   HGB 13.0 - 17.7 g/dL 11.2 (L)    - 450 x10E3/uL 73 (LL)   INR 0.9 - 1.2 2.1 (H)   AST 0 - 40 IU/L 65 (H)   ALT 0 - 44 IU/L 34   Alk Phos 44 - 121 IU/L 182 (H)   Bili, Total 0.0 - 1.2 mg/dL 9.6 (H)   Bili, Direct 0.00 - 0.40 mg/dL 4.58 (H)   Albumin 3.8 - 4.9 g/dL 3.4 (L)   BUN 6 - 24 mg/dL 7   Creat 0.76 - 1.27 mg/dL 0.68 (L)   Na 134 - 144 mmol/L 136   K 3.5 - 5.2 mmol/L 3.3 (L)   Cl 96 - 106 mmol/L 100   CO2 20 - 29 mmol/L 20   Glucose 65 - 99 mg/dL 108 (H)     SEROLOGIES:  Serologies Latest Ref Rng & Units 3/15/2022   Hep A Ab, Total Negative Positive (A)   Hep B Surface Ag Negative Negative   Hep B Core Ab, Total Negative Negative   Hep B Surface AB QL  Non Reactive   Hep C Ab 0.0 - 0.9 s/co ratio <0.1   Ferritin 30 - 400 ng/mL 937 (H)   Iron % Saturation 15 - 55 % 87 (HH)   JOSE, IFA  Negative   ASMCA 0 - 19 Units 16   Ceruloplasmin 16.0 - 31.0 mg/dL 21.5   Alpha-1 antitrypsin level 101 - 187 mg/dL 189 (H)     LIVER HISTOLOGY:  Not available or performed    ENDOSCOPIC PROCEDURES:  Not available or performed    RADIOLOGY:  3/2022. Ultrasound of liver. Echogenic consistent with chronic liver disease.   No liver mass lesions. No dilated bile ducts. Mild ascites. OTHER TESTING:  Not available or performed    FOLLOW-UP:  All of the issues listed above in the Assessment and Plan were discussed with the patient. All questions were answered. The patient expressed a clear understanding of the above. [address] [place] [address] in 4 weeks for [facility] to review all data and determine the treatment plan.       MD [address]  [address] [place] [address] 22.  [phone]  [address] [place] [address]

## 2022-03-15 NOTE — Clinical Note
4/5/2022    Patient: Rand Kirkpatrick   YOB: 1965   Date of Visit: 3/15/2022     [address] [id number]  Via Fax: [phone]    Dear Tawanda Connors DO,      Thank you for referring Mr. Rand Kirkpatrick to [facility] for evaluation. My notes for this consultation are attached. If you have questions, please do not hesitate to call me. I look forward to following your patient along with you.       Sincerely,    Andre Trujillo MD

## 2022-03-15 NOTE — PROGRESS NOTES
Identified pt with two pt identifiers(name and ). Reviewed record in preparation for visit and have obtained necessary documentation. Chief Complaint   Patient presents with    New Patient     Establish care      Vitals:    03/15/22 1250   BP: 136/71   Pulse: 81   Resp: 17   Temp: (!) 96.7 °F (35.9 °C)   TempSrc: Temporal   SpO2: 99%   Weight: 186 lb (84.4 kg)   Height: 6' 3\" (1.905 m)   PainSc:   0 - No pain       Health Maintenance Review: Patient reminded of \"due or due soon\" health maintenance. I have asked the patient to contact his/her primary care provider (PCP) for follow-up on his/her health maintenance. Coordination of Care Questionnaire:  :   1) Have you been to an emergency room, urgent care, or hospitalized since your last visit? If yes, where when, and reason for visit? no       2. Have seen or consulted any other health care provider since your last visit? If yes, where when, and reason for visit? NO      Patient is accompanied by sister I have received verbal consent from Rand Kirkpatrick to discuss any/all medical information while they are present in the room.

## 2022-03-16 NOTE — PROGRESS NOTES
Next appt 4/7/22. Faxed labs to PCP office. Increase in PT/INR, Bili T (9.6), Bili Direct (4.58), alk phos slight increase (129), ferritin (937), iron profile low/high numbers.

## 2022-04-07 NOTE — PROCEDURES
[facility], MD, FACP, [address] Marjan [place], [place]      DANNA Covarrubias, [facility]     April S Enma [facility]   FIDELINA Chung [facility]       [facility]    at [place] [address], [address] [address]  22.    [phone]    FAX: [address] [place] [address], [address]    [phone]    FAX: [phone]         UPPER ENDOSCOPY PROCEDURE NOTE    [facility]  1965    INDICATION: Cirrhosis. Screening for esophageal varices with variceal ligation if  indicated. : Evette Bhat MD    SURGICAL ASSISTANT:  None    PROSTHETIC DEVISES, TISSUE GRAFTS, ORGAN TRANSPLANTS:  Not applicable     ANESTHESIA/SEDATION: MAC Sedation per anesthesiology    PROCEDURE DESCRIPTION:  [employer] consent was obtained from the patient for the procedure. All risks and benefits of the procedure explained. The procedure was performed in the endoscopy suite. The patient was laying on a stretcher and moved to the left lateral decubitus position prior to administration of sedation. Sedation was administered by anesthesiology. See their note for details. The endoscope was inserted into the mouth and advanced under direct vision to the second portion of the duodenum. Careful inspection of upper gastrointestinal tract was made as the endoscope was inserted and withdrawn. Retroflexion of the endoscope to view of the cardia of the stomach was performed. The findings and interventions are described below. FINDINGS:  Esophagus:    Esophagitis in the lower half of the esophagus with some bleeding and shallow ulcerations     Stomach:    Moderate portal hypertensive gastropathy of the body of the stomach  No gastric varices identified. Duodenum:   Normal bulb and second portion    SPECIMENS COLLECTED:   None    INTERVENTIONS:  None    COMPLICATIONS: None. The patient tolerated the procedure well. EBL: Negligible. RECOMMENDATIONS:  Observe until discharge parameters are achieved. May resume previous diet. Repeat endoscopy to reassess varices and need for banding in 2 years. Follow-up Liver [facility] office as scheduled.       MD Vito Pineda [address], [address] [place] [address] 22.  [phone]  [address] [place] [address]

## 2022-04-07 NOTE — ANESTHESIA PREPROCEDURE EVALUATION
Relevant Problems   CARDIOVASCULAR   (+) Mitral valve prolapse      GASTROINTESTINAL   (+) Alcoholic liver disease (HCC)   (+) Cirrhosis (HCC)       Anesthetic History   No history of anesthetic complications            Review of Systems / Medical History  Patient summary reviewed, nursing notes reviewed and pertinent labs reviewed    Pulmonary          Smoker (quit 06/19)      Comments: Sarcoidosis   Neuro/Psych              Cardiovascular    Hypertension              Exercise tolerance: >4 METS  Comments: MVP   GI/Hepatic/Renal           Liver disease (cirrhosis )     Endo/Other        Arthritis (gout)     Other Findings   Comments: H/o heavy ETOH abuse; quit 10/18  Jaundice  Ascites   tachy         Physical Exam    Airway  Mallampati: II  TM Distance: 4 - 6 cm  Neck ROM: normal range of motion   Mouth opening: Normal     Cardiovascular    Rhythm: regular  Rate: normal         Dental    Dentition: Caps/crowns     Pulmonary  Breath sounds clear to auscultation               Abdominal  GI exam deferred       Other Findings            Anesthetic Plan    ASA: 3  Anesthesia type: MAC          Induction: Intravenous  Anesthetic plan and risks discussed with: Patient

## 2022-04-07 NOTE — DISCHARGE INSTRUCTIONS
[facility], MD, [address], [place], [place]      DANNA Madera, [facility]     April S Enma, [facility]   Natasha Lemon, FIDELINA Kessler, [facility]       Elisabeth Vargas [address] 136    at [address], [facility] Rob De La Cruz  22.    [phone]    FAX: [address] [place] [address], [address]    [phone]    FAX: [phone]         ENDOSCOPY DISCHARGE INSTRUCTIONS      Flor Kevon  1965  Date: 4/7/2022    DISCOMFORT:  Use lozenges or warm salt water gargle for sore thoat  Apply warm compress to IV site if red. If redness or soreness persists call the office. You may experience gas and bloating. Walking and belching will help relieve this. You may experience chest discomfort or pressure especially if banding of esophageal varices was performed. DIET:  You may resume your normal diet. ACTIVITY:  Spend the remainder of the day resting. Avoid any strenuous activity. You may not operate a vehicle for 12 hours. You may not engage in an occupation involving machinery or appliances for rest of today. Avoid making any critical or financial decisions for at least 24 hour. Call the [employer] of [address] [place] [address] if you have any of the following:  Increasing chest or abdominal pain, nausea, vomiting, vomiting blood, abdominal distension or swelling, fever or chills, bloody discharge from nose or mouth or shortness of breath. Follow-up Instructions:  Call Dr. Caterina Walker for any questions or problems at the phone number listed above. If a biopsy was performed, you will be contacted by the office staff or Dr Caterina Walker within 1 week.    If you have not heard from us by then you may call the office at the phone number listed above to inquire about the results. ENDOSCOPY FINDINGS:  Your endoscopy demonstrated esophagitis, swelling of the stomach. Will repeat EGD to look for development of varices in 2 years. Keep follow-up appointment with MLS on 4/19/2022. DISCHARGE SUMMARY from the Nurse: The following personal items collected during your admission are returned to you:   Dental Appliance: Dental Appliances: None  Vision: Visual Aid: None  Hearing Aid:    Jewelry:    Clothing:    Other Valuables:    Valuables sent to safe:                          Learning About Coronavirus (COVID-19)  Coronavirus (COVID-19): Overview  What is coronavirus ([id number])? The coronavirus disease (COVID-19) is caused by a virus. It is an illness that was first found in [place], [place], in December 2019. It has since spread worldwide. The virus can cause fever, cough, and trouble breathing. In severe cases, it can cause pneumonia and make it hard to breathe without help. It can cause death. Coronaviruses are a large group of viruses. They cause the common cold. They also cause more serious illnesses like Middle East respiratory syndrome (MERS) and severe acute respiratory syndrome (SARS). COVID-19 is caused by a novel coronavirus. That means it's a new type that has not been seen in people before. This virus spreads person-to-person through droplets from coughing and sneezing. It can also spread when you are close to someone who is infected. And it can spread when you touch something that has the virus on it, such as a doorknob or a tabletop. What can you do to protect yourself from coronavirus (COVID-19)? The best way to protect yourself from getting sick is to:  · Avoid areas where there is an outbreak. · Avoid contact with people who may be infected. · Wash your hands often with soap or alcohol-based hand sanitizers. · Avoid crowds and try to stay at least 6 feet away from other people.   · Wash your hands often, especially after you cough or sneeze. Use soap and water, and scrub for at least 20 seconds. If soap and water aren't available, use an alcohol-based hand . · Avoid touching your mouth, nose, and eyes. What can you do to avoid spreading the virus to others? To help avoid spreading the virus to others:  · Cover your mouth with a tissue when you cough or sneeze. Then throw the tissue in the trash. · Use a disinfectant to clean things that you touch often. · Stay home if you are sick or have been exposed to the virus. Don't go to school, work, or public areas. And don't use public transportation. · If you are sick:  ? Leave your home only if you need to get medical care. But call the doctor's office first so they know you're coming. And wear a face mask, if you have one.  ? If you have a face mask, wear it whenever you're around other people. It can help stop the spread of the virus when you cough or sneeze. ? Clean and disinfect your home every day. Use household  and disinfectant wipes or sprays. Take special care to clean things that you grab with your hands. These include doorknobs, remote controls, phones, and handles on your refrigerator and microwave. And don't forget countertops, tabletops, bathrooms, and computer keyboards. When to call for help  Call 911 anytime you think you may need emergency care. For example, call if:  · You have severe trouble breathing. (You can't talk at all.)  · You have constant chest pain or pressure. · You are severely dizzy or lightheaded. · You are confused or can't think clearly. · Your face and lips have a blue color. · You pass out (lose consciousness) or are very hard to wake up. Call your doctor now if you develop symptoms such as:  · Shortness of breath. · Fever. · Cough. If you need to get care, call ahead to the doctor's office for instructions before you go.  Make sure you wear a face mask, if you have one, to prevent exposing other people to the virus.  Where can you get the latest information? The following health organizations are tracking and studying this virus. Their websites contain the most up-to-date information. Michaela Mcgraw also learn what to do if you think you may have been exposed to the virus. · U.S. Centers for Disease Control and Prevention (CDC): The CDC provides updated news about the disease and travel advice. The website also tells you how to prevent the spread of infection. www.cdc.gov  · World Health Organization [facility]): WHO offers information about the virus outbreaks. WHO also has travel advice. www.who.int  Current as of: April 1, 2020               Content Version: 12.4  © 0949-0916 Healthwise, Incorporated. Care instructions adapted under license by your healthcare professional. If you have questions about a medical condition or this instruction, always ask your healthcare professional. [address] any warranty or liability for your use of this information.

## 2022-04-07 NOTE — H&P
3340 [facility], MD, [address], [facility], [place]      DANNA Culver [facility] [place], [facility]   FIDELINA Gibbons, [facility]       Elisabeth Cantu [facility] 136    at [address], [facility] Rob De La Cruz  22.    [phone]    FAX: [address] [place] [address], [address]    [phone]    FAX: [phone]         PRE-PROCEDURE NOTE - EGD    H and P from last office visit reviewed. Allergies reviewed. Out-patient medicaton list reviewed. Patient Active Problem List   Diagnosis Code    Cirrhosis ([facility] B57.64    Alcoholic liver disease ([facility] K70.9    Sarcoidosis, lung ([facility] D86.0    Mitral valve prolapse I34.1       Allergies   Allergen Reactions    Contrast Agent [Iodine] Itching    Penicillin G Hives     30 years ago        No current facility-administered medications on file prior to encounter. Current Outpatient Medications on File Prior to Encounter   Medication Sig Dispense Refill    cefUROXime (Ceftin) 500 mg tablet Take 500 mg by mouth two (2) times a day. (Patient not taking: Reported on 4/7/2022)         For EGD to assess for esophageal and gastric varices. Plan to perform banding if indicated based upon variceal size and appearance. The risks of the procedure were discussed with the patient. These included reaction to anesthesia, pain, perforation and bleeding. All questions were answered. The patient wishes to proceed with the procedure. The patient was counseled at length about the risks of bonita Covid-19 in the tia-operative and post-operative states including the recovery window of their procedure.   The patient was made aware that bointa Covid-19 after a surgical procedure may worsen their prognosis for recovering from the virus and lend to a higher morbidity and or mortality risk. The patient was given the options of postponing their procedure. All of the risks, benefits, and alternatives were discussed. The patient does  wish to proceed with the procedure. PHYSICAL EXAMINATION:  Visit Vitals  /72   Pulse (!) 112   Temp 97.8 °F (36.6 °C)   Resp 16   Ht 6' 3\" (1.905 m)   Wt 186 lb (84.4 kg)   SpO2 99%   BMI 23.25 kg/m²       General: No acute distress. Eyes: Sclera anicteric. ENT: No oral lesions. Thyroid normal.  Nodes: No adenopathy. Skin: No spider angiomata. No jaundice. No palmar erythema. Respiratory: Lungs clear to auscultation. Cardiovascular: Regular heart rate. No murmurs. No JVD. Abdomen: Soft non-tender, liver size normal to percussion/palpation. Spleen not palpable. No obvious ascites. Extremities: No edema. No muscle wasting. No gross arthritic changes. Neurologic: Alert and oriented. Cranial nerves grossly intact. No asterixis. MOST RECENT LABORATORY STUDIES:  Lab Results   Component Value Date/Time    WBC 6.5 03/15/2022 02:36 PM    HGB 11.2 (L) 03/15/2022 02:36 PM    HCT 31.0 (L) 03/15/2022 02:36 PM    PLATELET 73 (LL) 25/17/0374 02:36 PM     (H) 03/15/2022 02:36 PM     Lab Results   Component Value Date/Time    INR 2.1 (H) 03/15/2022 02:36 PM    INR 1.1 05/29/2011 04:00 PM    Prothrombin time 20.9 (H) 03/15/2022 02:36 PM    Prothrombin time 10.9 05/29/2011 04:00 PM       ASSESSMENT AND PLAN:  EGD to assess for esophageal and/or gastric varices.   Sedation per anesthesiology      MD Vito Rizzo [address] of [address], [address] [place] [address] 22.  [phone]  [address] [place] [address]

## 2022-04-07 NOTE — PERIOP NOTES

## 2022-04-08 NOTE — TELEPHONE ENCOUNTER
Patient called indicating that provider informed him that he would have someone to call him back today regarding getting a paracentesis since he was bloated from a procedure he had yesterday. Patient states he looks like is is 6 months pregnant and that he has some SOB but that this has been going on for a few days. Requests a call back to schedule a paracentesis as soon as possible.

## 2022-04-08 NOTE — TELEPHONE ENCOUNTER
[email or web address] Scheduled para for 4/11/22 arrival time 0930 am for 10 am appt @[facility]. Light meal, , medication list and ID/insurance card. Patient has been informed of all information date/time/location and verbalize understanding. (KF)

## 2022-04-08 NOTE — ANESTHESIA POSTPROCEDURE EVALUATION
Post-Anesthesia Evaluation and Assessment    Patient: Macy Bedoya MRN: [id number]  SSN: [id number]    YOB: 1965  Age: 59 y.o. Sex: male      I have evaluated the patient and they are stable and ready for discharge from the PACU. Cardiovascular Function/Vital Signs  Visit Vitals  BP (!) 147/86   Pulse (!) 106   Temp 36.9 °C (98.5 °F)   Resp 12   Ht 6' 3\" (1.905 m)   Wt 84.4 kg (186 lb)   SpO2 97%   BMI 23.25 kg/m²       Patient is status post MAC anesthesia for Procedure(s):  ESOPHAGOGASTRODUODENOSCOPY (EGD). Nausea/Vomiting: None    Postoperative hydration reviewed and adequate. Pain:  Pain Scale 1: Numeric (0 - 10) (04/07/22 1700)  Pain Intensity 1: 0 (04/07/22 1700)   Managed    Neurological Status: At baseline    Mental Status, Level of Consciousness: Alert and  oriented to person, place, and time    Pulmonary Status:   O2 Device: None (Room air) (04/07/22 1700)   Adequate oxygenation and airway patent    Complications related to anesthesia: None    Post-anesthesia assessment completed. No concerns    Signed By: Wolf Henson MD     April 8, 2022              Procedure(s):  ESOPHAGOGASTRODUODENOSCOPY (EGD). general    <[email or web address]>    INITIAL Post-op Vital signs:   Vitals Value Taken Time   /86 04/07/22 1700   Temp 36.9 °C (98.5 °F) 04/07/22 1643   Pulse 108 04/07/22 1700   Resp 15 04/07/22 1700   SpO2 97 % 04/07/22 1700   Vitals shown include unvalidated device data.

## 2022-04-11 NOTE — ROUTINE PROCESS
I have reviewed discharge instructions with the patient. The patient verbalized understanding. Copy of discharge instructions per AVS copied, reviewed with pt., signature sheet signed and sent to [employer] for scanning r/t penpad not working and copy to pt. Prior to discharge. Pt pain free at this time. bandaid from paracentesis site noted clean/dry/intact without bleeding, swelling or pain noted. Pt voicing relief of abdomen feeling \"swollen\". Pt dressed self and sat into w/c for discharge to home via private vehicle with his sister driving him home. Pt accomp by nurse to car for discharge.

## 2022-04-11 NOTE — ROUTINE PROCESS
Pt to xray recovery via w/c accomp. By nurse. Pt awake, alert and oriented x 3. Pt noted with yellowing of his sclera, skin. Pt abd. Noted distended, soft and pt.  States \"it wasn't like that a couple days ago\"

## 2022-04-11 NOTE — DISCHARGE INSTRUCTIONS
[facility]  Radiology Department  [phone]    Radiologist:  Dr. Bonnie Zepeda, [address]    Date:  April 11, 2022      Paracentesis Discharge Instructions    You may have an aching pain in your abdomen at the puncture site tonight as the numbing medicine wears off. You may take Tylenol if allowed, as directed on the label. Resume your previous diet and prescribed medications. Rest the remainder of today. If we have removed a large volume of fluid, you could be lightheaded or dizzy when making position changes. You may shower in 24 hours. Keep your dressing clean and dry. You may replace the dressing or band-aid if it becomes moist or soiled. Watch for signs of infection at the puncture site:  redness, swelling, pus, fever or chills. Should any of of these occur contact your physician immediately. If you experience severe sweating and new, severe abdominal pain seek emergency medical treatment. If any lab samples were sent during your procedure today the resutls will be sent to your Physician. Follow up with your physician as previously planned. If you have any questions please call and ask to speak to a radiology nurse.

## 2022-04-11 NOTE — PROGRESS NOTES
[place], [place]., inserted paracentesis fluid outer, lower abd. Without difficulty with immed. Return clear, yellow fluid.

## 2022-04-12 NOTE — TELEPHONE ENCOUNTER
Patient has congestion and wants to know what he can would like to know what medicine is ok to take sudafed / tylenol?  Patient would like a call back

## 2022-04-12 NOTE — TELEPHONE ENCOUNTER
[email or web address] Spoke w/patient concerning sudafed/tylenol for nasal congestion only recommended dose. Can take allergy pills if needed. Patient verbalize understanding. (KF)

## 2022-04-14 NOTE — TELEPHONE ENCOUNTER
[email or web address] Spoke w/US staff @[facility] concerning oozing from puncture site since para on 4/11/22. At this time [address] will need to see patient next week--patient possibly will need another para. Scheduled for para on 4/19/22 arrival time 0930 am for appt at 10 am @[facility]. Patient also have appt with MLS on that day. Patient sister made aware of date/time/location. [address] with Jose Luis Rahman in [address] to change location to [facility] @10 am on 4/19/22. Patient sister made aware of the change in location. (KF)

## 2022-04-14 NOTE — TELEPHONE ENCOUNTER
Patient's sister called regarding removal of fluid from Monday. Where he was punctured it is still oozing. He has two bruises - one on front of stomach and another on lower back. She thinks he is having more fluid build up as well again.

## 2022-04-19 NOTE — Clinical Note
5/8/2022    Patient: Saul Jordan   YOB: 1965   Date of Visit: 4/19/2022     Raj Roy  [address] [phone]  Via Fax: [phone]    Dear Lauren Downing DO,      Thank you for referring Mr. Saul Jordan to [address] [facility] for evaluation. My notes for this consultation are attached. If you have questions, please do not hesitate to call me. I look forward to following your patient along with you.       Sincerely,    Gucci Walker MD

## 2022-04-19 NOTE — PROGRESS NOTES
[address], MD, [address], [place], [place]      DANNA Blackwell, ACNP-BC     April S Enma, [facility]   FIDELINA Nguyen, [facility]       Elisabethmelissa Cantu Sam [address] 136    at [facility]    [facility], [address] [place] [address], [facility].    [phone]    FAX: [address] [place] [address], [address]    [phone]    FAX: [phone]       Patient Care Team:  Juan Luis Hurtado DO as PCP - General (Family Medicine)  Patrick Jrodan MD (Gastroenterology)      Problem List  Date Reviewed: 3/15/2022          Codes Class Noted    Cirrhosis ([facility] ICD-10-CM: K74.60  ICD-9-CM: 571.5  Unknown        Alcoholic liver disease ([facility] ICD-10-CM: K70.9  ICD-9-CM: 571.3  Unknown        Sarcoidosis, lung ([facility] ICD-10-CM: D86.0  ICD-9-CM: 135, 517.8  Unknown        Mitral valve prolapse ICD-10-CM: I34.1  ICD-9-CM: 424.0  Unknown              Keesha Manzano is being seen at 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 The active problem list, all pertinent past medical history, medications, endoscopic studies, radiologic findings and laboratory findings related to the liver disorder were reviewed and discussed with the patient. The patient is a 59 y.o.  male who was found to have chronic liver disease and cirrhosis in 11/2018 when he was hospitalized with jaundice. Serologic evaluation for markers of chronic liver disease was negative. An assessment of liver fibrosis with biopsy or elastography has not been performed. The patient has not developed any of the major complications of cirrhosis to date.     The patient has the following symptoms which could be attributed to the liver disorder:    Fatigue is greatly improved  yellowing of the eyes or skin persists    The patient is not experiencing the following symptoms which are commonly seen in this liver disorder:   problems concentrating,   swelling of the abdomen,   swelling of the lower extremities,   hematemesis,   hematochezia. The patient completes all daily activities without any functional limitations. ASSESSMENT AND PLAN:  Cirrhosis  The diagnosis of cirrhosis is based upon imaging, laboratory studies, complications of cirrhosis. 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 The CTP is 10. Child class C. The MELD score is up to 31. Will need to start liver transplant evaluation. IT si unlikely that he will recover liver function. Alcohol liver disease  The diagnosis is based upon a history of consuming significant alcohol on a daily basis for many years, pattern of AST>ALT, an elevation in ferritin and FE saturation, serology that is negative for other causes of chronic liver disease. The histologic severity has not been defined. The patient has consumed alcoholic in excess for many years. He reduced alcohol use to 6 drinks per day when he found out he liver disease. The patient has been abstinent from alcohol since 11/2021 when he was hospitalized for jaundice. Discussed the need to reman abstinent from alcohol. Will confirm this with PEth    Elevation in Ferritin  There is an elevation in ferritin with elevated iron saturation. The patient may have hemochromatosis or be a carrier for an HFE gene. The elevation in ferritin may be secondary to alcohol use and will come down to normal with abstinence. HFE genetic testing was negative    Ascites   Ascites developed for the first time in 3/2022. Ascites is mild, seen on US only and not clinically apparent.     The patient was counseled regarding the need to maintain sodium restriction and the types of foods containing high amounts of sodium to be avoided. Screening for Esophageal varices   The patient has not had an EGD to screen for varices. Will schedule for EGD to assess for varices     Hepatic encephalopathy   Overt HE has not developed to date. There is no reason for treatment with lactulose of xifaxan  There is no need to restrict dietary protein at this time. Thrombocytopenia   This is secondary to cirrhosis. There is no evidence of overt bleeding. No treatment is required. The platelet count is adequate for the patient to undergo procedures without the need for platelet transfusion or platelet growth factors. Anemia   This is due to multifactorial causes including portal hypertension with chronic GI blood loss, bone marrow suppression secondary to alcohol. The most recent FE studies were from 3/2022. The serum ferritin is 937  The FE saturation is 87  Will schedule for EGD to assess for UGI blood loss. Screening for Hepatocellular Carcinoma  HCC screening has not been not been performed   Will perform liver US to screen for [address] Treatment of other medical problems in patients with chronic liver disease  There are no contraindications for the patient to take most medications that are necessary for treatment of other medical issues. The patient has cirrhrosis and should avoid taking NSAIDs which are associated with a higher rate of developing GERALDINE. The patient can take any medications utilized for treatment of DM, statins to treat hypercholesterolemia    The patient consumes alcohol on a daily basis or has recently stopped consuming alcohol. Regular alcohol use increases the risk of toxicity from acetaminophen. This analgesic should be avoided until the patient has been abstinent from alcohol for 6 months. Osteoporosis  The risk of osteoporosis is increased in patients with cirrhosis. DEXA bone density to assess for osteoporosis has not been performed.     This will be scheduled as part of liver transplant evaluation. Vaccinations   Vaccination for viral hepatitis A is not needed. The patient has serologic evidence of prior exposure or vaccination with immunity. The patient has received 2 doses of COVID-19 vaccine. Routine vaccinations against other bacterial and viral agents can be performed as indicated. Annual flu vaccination should be administered if indicated. ALLERGIES  Allergies   Allergen Reactions    Contrast Agent [Iodine] Itching    Penicillin G Hives     30 years ago        MEDICATIONS  Current Outpatient Medications   Medication Sig    levocetirizine (Xyzal) 5 mg tablet Take 5 mg by mouth daily as needed for Allergies.  spironolactone (Aldactone) 100 mg tablet Take 1 Tablet by mouth daily.  furosemide (Lasix) 40 mg tablet Take 1 Tablet by mouth daily.  pantoprazole (Protonix) 20 mg tablet Take 2 Tablets by mouth daily.  citalopram (CELEXA) 10 mg tablet     cefUROXime (Ceftin) 500 mg tablet Take 500 mg by mouth two (2) times a day. (Patient not taking: Reported on 2022)     No current facility-administered medications for this visit. SYSTEM REVIEW NOT RELATED TO LIVER DISEASE OR REVIEWED ABOVE:  Constitution systems: Negative for fever, chills, weight gain, weight loss. Eyes: Eyes are yellow. ENT: Negative for sore throat, painful swallowing. Respiratory: Negative for cough, hemoptysis, SOB. Cardiology: Negative for chest pain, palpitations. GI:  Negative for constipation or diarrhea. : Negative for urinary frequency, dysuria, hematuria, nocturia. Skin: Negative for rash. Hematology: Negative for easy bruising, blood clots. Musculo-skelatal: Negative for back pain, muscle pain, weakness. Neurologic: Negative for headaches, dizziness, vertigo, memory problems not related to HE. Psychology: Negative for anxiety, depression. FAMILY HISTORY:  The father  of AAA. The mother  of CVA.     There is no family history of liver disease. SOCIAL HISTORY:  The patient has never been . The patient has no children. The patient stopped using tobacco products in 2017    The patient had been consuming 1/5 of alcohol per day. He reduced this to 6 alcoholic beverages per day in 2018 when he found out he had liver disease. The patient has been abstinent from alcohol since 11/2021. The patient used to work as  for the [employer]. PHYSICAL EXAMINATION:  Visit Vitals  BP (!) 119/53 (BP 1 Location: Right upper arm, BP Patient Position: Sitting, BP Cuff Size: Adult)   Pulse (!) 103   Temp 97.7 °F (36.5 °C) (Temporal)   Resp 16   Ht 6' 3\" (1.905 m)   Wt 186 lb (84.4 kg)   SpO2 98%   BMI 23.25 kg/m²     General: No acute distress. Eyes: Sclera icteric. ENT: No oral lesions. Thyroid normal.  Nodes: No adenopathy. Skin: Spider angiomata. Jaundice. Respiratory: Lungs clear to auscultation. Cardiovascular: Regular heart rate. No murmurs. No JVD. Abdomen: Soft non-tender. Liver size normal to percussion/palpation. Spleen not palpable. No obvious ascites. Extremities: No edema. No muscle wasting. No gross arthritic changes. Neurologic: Alert and oriented. Cranial nerves grossly intact. No asterixis.     LABORATORY STUDIES:  Liver [place] of [address] & Units 4/19/2022 3/15/2022   ANC 1.4 - 7.0 x10E3/uL 6.3 4.9   HGB 13.0 - 17.7 g/dL 7.1 (L) 11.2 (L)    - 450 x10E3/uL 78 (LL) 73 (LL)   INR 0.9 - 1.2 2.4 (H) 2.1 (H)   AST 0 - 40 IU/L 91 (H) 65 (H)   ALT 0 - 44 IU/L 57 (H) 34   Alk Phos 44 - 121 IU/L 192 (H) 182 (H)   Bili, Total 0.0 - 1.2 mg/dL 19.4 (>) 9.6 (H)   Bili, Direct 0.00 - 0.40 mg/dL 9.17 (H) 4.58 (H)   Albumin 3.8 - 4.9 g/dL 3.0 (L) 3.4 (L)   BUN 6 - 24 mg/dL 27 (H) 7   Creat 0.76 - 1.27 mg/dL 1.40 (H) 0.68 (L)   Na 134 - 144 mmol/L 136 136   K 3.5 - 5.2 mmol/L 3.3 (L) 3.3 (L)   Cl 96 - 106 mmol/L 95 (L) 100   CO2 20 - 29 mmol/L 21 20   Glucose 65 - 99 mg/dL 156 (H) 108 (H)       SEROLOGIES:  Serologies Latest Ref Rng & Units 3/15/2022   Hep A Ab, Total Negative Positive (A)   Hep B Surface Ag Negative Negative   Hep B Core Ab, Total Negative Negative   Hep B Surface AB QL  Non Reactive   Hep C Ab 0.0 - 0.9 s/co ratio <0.1   Ferritin 30 - 400 ng/mL 937 (H)   Iron % Saturation 15 - 55 % 87 (HH)   JOSE, IFA  Negative   ASMCA 0 - 19 Units 16   Ceruloplasmin 16.0 - 31.0 mg/dL 21.5   Alpha-1 antitrypsin level 101 - 187 mg/dL 189 (H)     4/2022. HFE genetic testing negative    LIVER HISTOLOGY:  Not available or performed    ENDOSCOPIC PROCEDURES:  Not available or performed    RADIOLOGY:  3/2022. Ultrasound of liver. Echogenic consistent with chronic liver disease. No liver mass lesions. No dilated bile ducts. Mild ascites. OTHER TESTING:  Not available or performed    FOLLOW-UP:  All of the issues listed above in the Assessment and Plan were discussed with the patient. All questions were answered. The patient expressed a clear understanding of the above. [address] [place] [address] in 4 weeks for monitoring and to review LT evaluation testing.         Curry Garcia MD  [facility] 13 of [address], [address] [place] [address] 22.  [phone]  [address] [place] [address]

## 2022-04-19 NOTE — PROGRESS NOTES
Identified pt with two pt identifiers(name and ). Reviewed record in preparation for visit and have obtained necessary documentation. Chief Complaint   Patient presents with    Cirrhosis Of Liver     4 week f/u      Vitals:    22 0922   BP: (!) 119/53   Pulse: (!) 103   Resp: 16   Temp: 97.7 °F (36.5 °C)   TempSrc: Temporal   SpO2: 98%   Weight: 186 lb (84.4 kg)   Height: 6' 3\" (1.905 m)   PainSc:   7   PainLoc: Hip       Health Maintenance Review: Patient reminded of \"due or due soon\" health maintenance. I have asked the patient to contact his/her primary care provider (PCP) for follow-up on his/her health maintenance. Coordination of Care Questionnaire:  :   1) Have you been to an emergency room, urgent care, or hospitalized since your last visit? If yes, where when, and reason for visit? no       2. Have seen or consulted any other health care provider since your last visit? If yes, where when, and reason for visit? NO      Patient is accompanied by sister I have received verbal consent from [facility] to discuss any/all medical information while they are present in the room.

## 2022-04-22 NOTE — TELEPHONE ENCOUNTER
4/21/2022 late entry:    Pts sister, Ranjan Davis, left message asking for a call back regarding PT and pre-medication for CT. Faxed PT order to [address] [place] [address] at [phone]. Received VO from Dr. Carloz Lao for Medrol 32 mg PO 12 hours and 2 hours prior to contrast. Also received VO for PEth test given increased T. Bili and INR. Pt reports he stopped consuming ETOH in 11/2021. Return call placed to Ranjan Davis. I told her PT order has been faxed. I asked her to call the facility if she hasn't heard from them in 2-3 days. Advised sister to give Medrol and Benadryl 50 mg PO together. Sister has noticed pt sleeps more and she asked if that's normal. Upon further questioning, she's also noticed sometimes he'll be \"staring off,\" he has trouble finding his words, and he shakes when eating/drinking. I asked about obvious bleeding sources given drop in hemoglobin. She said he's been having nosebleeds almost every day. Also reports coughing that sounds like mucous/congestion, but he coughs up blood clots. She said he had sinus surgery years ago and she doesn't think he needs the Sudafed and antihistamine he's been taking.     4/22/2022 update:    Return call placed to . I told her I reviewed symptoms with Dr. Carloz Lao. I let her know Dr. Carloz Lao wants pt to begin lactulose. We discussed that goal is 2-3 soft BMs per day and dose can be titrated as needed. I relayed message from Dr. Carloz Lao that we'll see what chest CT shows. Relayed message that pt can continue or stop Sudafed and antihistamine. I asked sister to bring pt by the office on Monday for labwork, since he'll be at [facility] for testing.

## 2022-04-25 NOTE — TELEPHONE ENCOUNTER
[email or web address] Patient will need additional information before scheduled cardiac stress test/echo on 4/28/22. Call insurance company at [phone]. Then call the central scheduling dept  to make aware. (KF)

## 2022-04-25 NOTE — PROGRESS NOTES
This note also relates to the following rows which could not be included:  PCO2 Lab Result (Last Filed Value) - Cannot attach notes to extension rows  PO2 on 100% O2 Lab Result (Last Filed Value) - Cannot attach notes to extension rows       04/25/22 0001   Liver Shunt Study Calculation   Liver Shunt Study 0   PAO2 686.6   Qs/Qt % (!) 25.8

## 2022-04-26 NOTE — TELEPHONE ENCOUNTER
Called [employer] to provide additional information. I was told the case was closed; therefore, plan does not allow [employer] to handle [id number]. Options are submit appeal/reconsideration request through the health plan or start a new case with [employer]. Called [employer] and reviewed the above. A new case was initiated.

## 2022-05-03 NOTE — TELEPHONE ENCOUNTER
Patient's sister called regarding questions on the counselor/psychologist needed as well as regarding physical therapy.

## 2022-05-06 NOTE — TELEPHONE ENCOUNTER
I spoke with pts sister and recommended a counselor/psychologist who specializes in Substance Abuse. She said pt has started physical therapy, but he'll only have had 5 sessions before his next appointment. I ok'd that for now and told her we'll see how much progress he's made.

## 2022-05-09 PROBLEM — R41.0 ACUTE DELIRIUM: Status: ACTIVE | Noted: 2022-01-01

## 2022-05-09 PROBLEM — K80.20 CALCULUS OF GALLBLADDER WITHOUT CHOLECYSTITIS WITHOUT OBSTRUCTION: Status: ACTIVE | Noted: 2022-01-01

## 2022-05-09 NOTE — PROGRESS NOTES
Hospitalist Progress Note      Hospital summary: This is a 78-year-old man with a past medical history significant for alcoholic liver cirrhosis, mitral valve prolapse, pulmonary sarcoidosis, who was in his usual state of health until the date of presentation at the emergency room when the patient developed confusion. The patient was brought to the emergency room by his spouse. When the patient arrived at the emergency room, CT scan of the head was obtained, this was negative. The patient's urinalysis was consistent with urinary tract infection. His ammonia level is within normal limits. The patient has been taking his medications as prescribed. The patient's spouse called his hepatologist who advised them to come to the emergency room. There was no fever, rigors, or chills reported. The patient denies any nausea or vomiting, also no abdominal pain. No record of prior admission to this hospital.  The patient has alcoholic liver cirrhosis. The patient last consumed alcohol in 11/2021. The patient is undergoing various evaluation prior to being referred for liver transplant at [facility]. The patient is not able to provide good history because of his confusion. 5/8/2022      Assessment/Plan:   Acute metabolic encephalopathy - improving  Unclear etiology  ? UTI  - normal ammonia level, salicylate level, X67 and folic acid level. - UDS- negative. TSH normal   - supportive care     Sepsis on poa ? UTI   -  presented with change in mental status, leukocytosis, tachycardia, elevated lactic acid level  - urinalysis consistent with urinary tract infection. - blood and urine cx pending   - c/w IV vancomycin and Zosyn     Alcoholic liver cirrhosis without ascites. Elevated LFTs  - on lactulose  - Hepatology consult    Elevated Lipase   - no abd pain. - CT abd: Cirrhosis with mild splenomegaly and small ascites. Cholecystolithiasis. Small bilateral nonobstructing renal calculi.   - started on CLD, advance as tolerated   - appreciate gen surgery input    Coagulopathy/ Thrombocytopenia  - due to liver cirrhosis. Will monitor     Anemia. This is most likely due to chronic disease. We will carry out anemia workup including checking stool guaiac to rule out occult GI bleed. Tobacco abuse. - counseled to quit. patient does not want Nicoderm patch at this time. Code status: Full  DVT prophylaxis: SCDs  Disposition: TBD  ----------------------------------------------    CC: AMS     S: Patient is seen and examined at bedside this AM. Niece present. He is alert and oriented x2-3, mentation improving. He denied any urinary symptoms now. Feels ok. No surgical intervention. Started on CLD. Discussed with nursing     Review of Systems:  A comprehensive review of systems was negative.     O:  Visit Vitals  /67 (BP 1 Location: Left upper arm, BP Patient Position: Supine)   Pulse 99   Temp 98 °F (36.7 °C)   Resp 13   Wt 84.2 kg (185 lb 10 oz)   SpO2 99%   BMI 23.20 kg/m²       PHYSICAL EXAM:  Gen: NAD  HEENT: icterus+, oropharynx clear, MM moist  Neck: supple, trachea midline, no adenopathy  Heart: RRR, no MRG, no JVD, no peripheral edema  Lungs: CTA b/l, non-labored respirations  Abd: soft, NT, ND, BS+, no organomegaly  Extr: warm  Skin: dry, no rash  Neuro: CN II-XII grossly intact, normal speech, moves all extremities  Psych: normal mood, appropriate affect      Intake/Output Summary (Last 24 hours) at 5/9/2022 1742  Last data filed at 5/9/2022 0002  Gross per 24 hour   Intake 500 ml   Output --   Net 500 ml        Recent labs & imaging reviewed:  Recent Results (from the past 24 hour(s))   EKG, 12 LEAD, INITIAL    Collection Time: 05/08/22 10:12 PM   Result Value Ref Range    Ventricular Rate 116 BPM    Atrial Rate 116 BPM    P-R Interval 126 ms    QRS Duration 80 ms    Q-T Interval 370 ms    QTC Calculation (Bezet) 514 ms    Calculated P Axis 87 degrees    Calculated R Axis 71 degrees Calculated T Axis 84 degrees    Diagnosis       Sinus tachycardia  Minimal voltage criteria for LVH, may be normal variant ( Sokolow-Mcdonald )  Nonspecific ST abnormality Prolonged QT  Abnormal ECG  When compared with ECG of 28-APR-2022 09:45,  QT has lengthened  Confirmed by Surya Morgan M.D., Demarionahun Solano ([id number]) on 5/9/2022 4:08:16 PM     AMMONIA    Collection Time: 05/08/22 10:15 PM   Result Value Ref Range    Ammonia, plasma <10 <32 UMOL/L   CBC WITH AUTOMATED DIFF    Collection Time: 05/08/22 10:21 PM   Result Value Ref Range    WBC 15.0 (H) 4.1 - 11.1 K/uL    RBC 2.35 (L) 4.10 - 5.70 M/uL    HGB 9.5 (L) 12.1 - 17.0 g/dL    HCT 28.9 (L) 36.6 - 50.3 %    .0 (H) 80.0 - 99.0 FL    MCH 40.4 (H) 26.0 - 34.0 PG    MCHC 32.9 30.0 - 36.5 g/dL    RDW 15.5 (H) 11.5 - 14.5 %    PLATELET 54 (L) 081 - 400 K/uL    MPV 11.2 8.9 - 12.9 FL    NRBC 0.0 0  WBC    ABSOLUTE NRBC 0.00 0.00 - 0.01 K/uL    NEUTROPHILS 88 (H) 32 - 75 %    LYMPHOCYTES 3 (L) 12 - 49 %    MONOCYTES 7 5 - 13 %    EOSINOPHILS 0 0 - 7 %    BASOPHILS 0 0 - 1 %    IMMATURE GRANULOCYTES 2 (H) 0.0 - 0.5 %    ABS. NEUTROPHILS 13.1 (H) 1.8 - 8.0 K/UL    ABS. LYMPHOCYTES 0.5 (L) 0.8 - 3.5 K/UL    ABS. MONOCYTES 1.1 (H) 0.0 - 1.0 K/UL    ABS. EOSINOPHILS 0.0 0.0 - 0.4 K/UL    ABS. BASOPHILS 0.0 0.0 - 0.1 K/UL    ABS. IMM.  GRANS. 0.3 (H) 0.00 - 0.04 K/UL    DF SMEAR SCANNED      RBC COMMENTS ANISOCYTOSIS  1+        RBC COMMENTS MACROCYTOSIS  2+        RBC COMMENTS JOSESITO CELLS  PRESENT       METABOLIC PANEL, COMPREHENSIVE    Collection Time: 05/08/22 10:21 PM   Result Value Ref Range    Sodium 131 (L) 136 - 145 mmol/L    Potassium 4.2 3.5 - 5.1 mmol/L    Chloride 98 97 - 108 mmol/L    CO2 24 21 - 32 mmol/L    Anion gap 9 5 - 15 mmol/L    Glucose 201 (H) 65 - 100 mg/dL    BUN 34 (H) 6 - 20 MG/DL    Creatinine 1.27 0.70 - 1.30 MG/DL    BUN/Creatinine ratio 27 (H) 12 - 20      GFR est AA >60 >60 ml/min/1.73m2    GFR est non-AA 58 (L) >60 ml/min/1.73m2    Calcium 8.5 8.5 - 10.1 MG/DL    Bilirubin, total 20.3 (H) 0.2 - 1.0 MG/DL    ALT (SGPT) 93 (H) 12 - 78 U/L    AST (SGOT) 56 (H) 15 - 37 U/L    Alk.  phosphatase 325 (H) 45 - 117 U/L    Protein, total 6.6 6.4 - 8.2 g/dL    Albumin 2.4 (L) 3.5 - 5.0 g/dL    Globulin 4.2 (H) 2.0 - 4.0 g/dL    A-G Ratio 0.6 (L) 1.1 - 2.2     LIPASE    Collection Time: 05/08/22 10:21 PM   Result Value Ref Range    Lipase 865 (H) 73 - 393 U/L   PROTHROMBIN TIME + INR    Collection Time: 05/08/22 10:21 PM   Result Value Ref Range    INR 2.1 (H) 0.9 - 1.1      Prothrombin time 21.1 (H) 9.0 - 11.1 sec   ETHYL ALCOHOL    Collection Time: 05/08/22 10:21 PM   Result Value Ref Range    ALCOHOL(ETHYL),SERUM <10 <10 MG/DL   URINALYSIS W/MICROSCOPIC    Collection Time: 05/09/22 12:49 AM   Result Value Ref Range    Color DARK YELLOW      Appearance CLOUDY (A) CLEAR      Specific gravity 1.017 1.003 - 1.030      pH (UA) 5.5 5.0 - 8.0      Protein Negative NEG mg/dL    Glucose Negative NEG mg/dL    Ketone Negative NEG mg/dL    Blood LARGE (A) NEG      Urobilinogen 1.0 0.2 - 1.0 EU/dL    Nitrites Negative NEG      Leukocyte Esterase MODERATE (A) NEG      WBC 10-20 0 - 4 /hpf    RBC 0-5 0 - 5 /hpf    Epithelial cells FEW FEW /lpf    Bacteria 3+ (A) NEG /hpf   BILIRUBIN, CONFIRM    Collection Time: 05/09/22 12:49 AM   Result Value Ref Range    Bilirubin UA, confirm Positive (A) NEG     LACTIC ACID    Collection Time: 05/09/22  1:16 AM   Result Value Ref Range    Lactic acid 2.3 (HH) 0.4 - 2.0 MMOL/L   METABOLIC PANEL, COMPREHENSIVE    Collection Time: 05/09/22  4:36 AM   Result Value Ref Range    Sodium 134 (L) 136 - 145 mmol/L    Potassium 3.8 3.5 - 5.1 mmol/L    Chloride 104 97 - 108 mmol/L    CO2 22 21 - 32 mmol/L    Anion gap 8 5 - 15 mmol/L    Glucose 129 (H) 65 - 100 mg/dL    BUN 30 (H) 6 - 20 MG/DL    Creatinine 0.93 0.70 - 1.30 MG/DL    BUN/Creatinine ratio 32 (H) 12 - 20      GFR est AA >60 >60 ml/min/1.73m2    GFR est non-AA >60 >60 ml/min/1.73m2    Calcium 7.6 (L) 8.5 - 10.1 MG/DL    Bilirubin, total 18.0 (H) 0.2 - 1.0 MG/DL    ALT (SGPT) 78 12 - 78 U/L    AST (SGOT) 49 (H) 15 - 37 U/L    Alk. phosphatase 288 (H) 45 - 117 U/L    Protein, total 5.8 (L) 6.4 - 8.2 g/dL    Albumin 2.2 (L) 3.5 - 5.0 g/dL    Globulin 3.6 2.0 - 4.0 g/dL    A-G Ratio 0.6 (L) 1.1 - 2.2     CBC WITH AUTOMATED DIFF    Collection Time: 05/09/22  4:36 AM   Result Value Ref Range    WBC 12.3 (H) 4.1 - 11.1 K/uL    RBC 2.09 (L) 4.10 - 5.70 M/uL    HGB 8.5 (L) 12.1 - 17.0 g/dL    HCT 25.5 (L) 36.6 - 50.3 %    .0 (H) 80.0 - 99.0 FL    MCH 40.7 (H) 26.0 - 34.0 PG    MCHC 33.3 30.0 - 36.5 g/dL    RDW 15.9 (H) 11.5 - 14.5 %    PLATELET 39 (LL) 899 - 400 K/uL    MPV 11.3 8.9 - 12.9 FL    NRBC 0.2 (H) 0  WBC    ABSOLUTE NRBC 0.02 (H) 0.00 - 0.01 K/uL    NEUTROPHILS 89 (H) 32 - 75 %    LYMPHOCYTES 4 (L) 12 - 49 %    MONOCYTES 5 5 - 13 %    EOSINOPHILS 1 0 - 7 %    BASOPHILS 0 0 - 1 %    MYELOCYTES 1 (H) 0 %    IMMATURE GRANULOCYTES 0 %    ABS. NEUTROPHILS 10.9 (H) 1.8 - 8.0 K/UL    ABS. LYMPHOCYTES 0.5 (L) 0.8 - 3.5 K/UL    ABS. MONOCYTES 0.6 0.0 - 1.0 K/UL    ABS. EOSINOPHILS 0.1 0.0 - 0.4 K/UL    ABS. BASOPHILS 0.0 0.0 - 0.1 K/UL    ABS. IMM.  GRANS. 0.0 K/UL    DF MANUAL      PLATELET COMMENTS Large Platelets      RBC COMMENTS ANISOCYTOSIS  1+        RBC COMMENTS MACROCYTOSIS  2+        RBC COMMENTS JOSESITO CELLS  PRESENT        RBC COMMENTS POLYCHROMASIA  PRESENT       TSH 3RD GENERATION    Collection Time: 05/09/22  4:36 AM   Result Value Ref Range    TSH 0.67 0.36 - 3.74 uIU/mL   MAGNESIUM    Collection Time: 05/09/22  4:36 AM   Result Value Ref Range    Magnesium 1.8 1.6 - 2.4 mg/dL   PHOSPHORUS    Collection Time: 05/09/22  4:36 AM   Result Value Ref Range    Phosphorus 2.3 (L) 2.6 - 4.7 MG/DL   LIPASE    Collection Time: 05/09/22  4:36 AM   Result Value Ref Range    Lipase 596 (H) 73 - 393 U/L   LIPID PANEL    Collection Time: 05/09/22  4:36 AM   Result Value Ref Range    Cholesterol, total 76 <200 MG/DL    Triglyceride 73 <150 MG/DL    HDL Cholesterol 57 MG/DL    LDL, calculated 4.4 0 - 100 MG/DL    VLDL, calculated 14.6 MG/DL    CHOL/HDL Ratio 1.3 0.0 - 5.0     FOLATE    Collection Time: 05/09/22  4:36 AM   Result Value Ref Range    Folate 11.5 5.0 - 21.0 ng/mL   VITAMIN B12    Collection Time: 05/09/22  4:36 AM   Result Value Ref Range    Vitamin B12 1,545 (H) 193 - 986 pg/mL   ACETAMINOPHEN    Collection Time: 05/09/22  4:36 AM   Result Value Ref Range    Acetaminophen level <2 (L) 10 - 30 ug/mL   SALICYLATE    Collection Time: 05/09/22  4:36 AM   Result Value Ref Range    Salicylate level <3.9 (L) 2.8 - 20.0 MG/DL   IRON PROFILE    Collection Time: 05/09/22  4:36 AM   Result Value Ref Range    Iron 94 35 - 150 ug/dL    TIBC 218 (L) 250 - 450 ug/dL    Iron % saturation 43 20 - 50 %   FERRITIN    Collection Time: 05/09/22  4:36 AM   Result Value Ref Range    Ferritin 514 (H) 26 - 388 NG/ML   TROPONIN-HIGH SENSITIVITY    Collection Time: 05/09/22  4:36 AM   Result Value Ref Range    Troponin-High Sensitivity 21 0 - 76 ng/L   NT-PRO BNP    Collection Time: 05/09/22  4:36 AM   Result Value Ref Range    NT pro- (H) <125 PG/ML   LACTIC ACID    Collection Time: 05/09/22  4:36 AM   Result Value Ref Range    Lactic acid 2.2 (HH) 0.4 - 2.0 MMOL/L   D DIMER    Collection Time: 05/09/22  4:36 AM   Result Value Ref Range    D-dimer 5.41 (H) 0.00 - 0.65 mg/L FEU   C REACTIVE PROTEIN, QT    Collection Time: 05/09/22  4:36 AM   Result Value Ref Range    C-Reactive protein 1.51 (H) 0.00 - 0.60 mg/dL   DRUG SCREEN, URINE    Collection Time: 05/09/22  4:38 AM   Result Value Ref Range    AMPHETAMINES Negative NEG      BARBITURATES Negative NEG      BENZODIAZEPINES Negative NEG      COCAINE Negative NEG      METHADONE Negative NEG      OPIATES Negative NEG      PCP(PHENCYCLIDINE) Negative NEG      THC (TH-CANNABINOL) Negative NEG      Drug screen comment (NOTE)      Recent Labs     05/09/22  3907 05/08/22 2221   WBC 12.3* 15.0*   HGB 8.5* 9.5*   HCT 25.5* 28.9*   PLT 39* 54*     Recent Labs     05/09/22 0436 05/08/22 2221   * 131*   K 3.8 4.2    98   CO2 22 24   BUN 30* 34*   CREA 0.93 1.27   * 201*   CA 7.6* 8.5   MG 1.8  --    PHOS 2.3*  --      Recent Labs     05/09/22 0436 05/08/22 2221   ALT 78 93*   * 325*   TBILI 18.0* 20.3*   TP 5.8* 6.6   ALB 2.2* 2.4*   GLOB 3.6 4.2*   LPSE 596* 865*     Recent Labs     05/08/22 2221   INR 2.1*   PTP 21.1*      Recent Labs     05/09/22 0436   TIBC 218*   PSAT 43   FERR 514*      Lab Results   Component Value Date/Time    Folate 11.5 05/09/2022 04:36 AM      No results for input(s): PH, PCO2, PO2 in the last 72 hours. No results for input(s): CPK, CKNDX, TROIQ in the last 72 hours.     No lab exists for component: CPKMB  Lab Results   Component Value Date/Time    Cholesterol, total 76 05/09/2022 04:36 AM    HDL Cholesterol 57 05/09/2022 04:36 AM    LDL, calculated 4.4 05/09/2022 04:36 AM    Triglyceride 73 05/09/2022 04:36 AM    CHOL/HDL Ratio 1.3 05/09/2022 04:36 AM     No results found for: GLUCPOC  Lab Results   Component Value Date/Time    Color DARK YELLOW 05/09/2022 12:49 AM    Appearance CLOUDY (A) 05/09/2022 12:49 AM    Specific gravity 1.017 05/09/2022 12:49 AM    pH (UA) 5.5 05/09/2022 12:49 AM    Protein Negative 05/09/2022 12:49 AM    Glucose Negative 05/09/2022 12:49 AM    Ketone Negative 05/09/2022 12:49 AM    Bilirubin NEGATIVE  05/05/2015 12:46 PM    Urobilinogen 1.0 05/09/2022 12:49 AM    Nitrites Negative 05/09/2022 12:49 AM    Leukocyte Esterase MODERATE (A) 05/09/2022 12:49 AM    Epithelial cells FEW 05/09/2022 12:49 AM    Bacteria 3+ (A) 05/09/2022 12:49 AM    WBC 10-20 05/09/2022 12:49 AM    RBC 0-5 05/09/2022 12:49 AM       Med list reviewed  Current Facility-Administered Medications   Medication Dose Route Frequency    lactated Ringers infusion  75 mL/hr IntraVENous CONTINUOUS    citalopram (CELEXA) tablet 10 mg  10 mg Oral DAILY    lactulose (CHRONULAC) 10 gram/15 mL solution 30 mL  20 g Oral DAILY    pantoprazole (PROTONIX) tablet 40 mg  40 mg Oral DAILY    sodium chloride (NS) flush 5-40 mL  5-40 mL IntraVENous Q8H    sodium chloride (NS) flush 5-40 mL  5-40 mL IntraVENous PRN    polyethylene glycol (MIRALAX) packet 17 g  17 g Oral DAILY PRN    ondansetron (ZOFRAN ODT) tablet 4 mg  4 mg Oral Q8H PRN    Or    ondansetron (ZOFRAN) injection 4 mg  4 mg IntraVENous Q6H PRN    L.acidophilus-paracasei-S.thermophil-bifidobacter (RISAQUAD) 8 billion cell capsule  1 Capsule Oral DAILY    sodium chloride (NS) flush 5-10 mL  5-10 mL IntraVENous PRN    cefepime (MAXIPIME) 2 g in 0.9% sodium chloride (MBP/ADV) 100 mL MBP  2 g IntraVENous Q8H    Vancomycin dosing per pharmacy   Other Rx Dosing/Monitoring    vancomycin (VANCOCIN) 750 mg in  mL infusion  750 mg IntraVENous Q12H       Care Plan discussed with:  Patient/Family and Nurse    Cherri Vega MD  Internal Medicine  Date of Service: 5/9/2022

## 2022-05-09 NOTE — ED NOTES
Emergency Room Nursing Communication Tool        Bedside and Verbal shift change report given to LILIBETH FROST RN (incoming nurse) by Blake Villela RN (outgoing nurse) on Kai Ortiz a 59 y.o. male and born 1965 who arrived at the hospital on 5/8/2022 10:11 PM. Report included the following information SBAR, Kardex, [facility] and Recent Results. Significant changes during shift: complaints of AMS      Issues for physician to address: none            Code Status: No Order     Chief Complaint: Altered mental status and Cirrhosis Of Liver     Admit Diagnosis: No admission diagnoses are documented for this encounter. Admitting Provider: No admitting provider for patient encounter.      Surgery: * No surgery found *     Infections: No current active infections     Allergies: Contrast agent [iodine] and Penicillin g     Current diet: No diet orders on file     Lines:   Peripheral IV 05/08/22 Right Antecubital (Active)   Site Assessment Clean, dry, & intact 05/08/22 2225   Phlebitis Assessment 0 05/08/22 2225   Infiltration Assessment 0 05/08/22 2225   Dressing Status Clean, dry, & intact 05/08/22 2225   Dressing Type Transparent 05/08/22 2225   Hub Color/Line Status Pink;Flushed;Patent 05/08/22 2225   Action Taken Blood drawn 05/08/22 2225                Vital Signs:     Patient Vitals for the past 12 hrs:   Temp Pulse Resp BP SpO2   05/09/22 0000 -- (!) 112 13 139/71 99 %   05/08/22 2300 -- (!) 116 15 (!) 146/80 98 %   05/08/22 2245 -- (!) 117 15 137/69 97 %   05/08/22 2230 -- -- -- 131/73 --   05/08/22 2200 99.8 °F (37.7 °C) (!) 123 18 133/79 99 %      Intake & Output:       Intake/Output Summary (Last 24 hours) at 5/9/2022 0044  Last data filed at 5/9/2022 0002  Gross per 24 hour   Intake 500 ml   Output --   Net 500 ml      Laboratory Results:     Recent Results (from the past 12 hour(s))   EKG, 12 LEAD, INITIAL    Collection Time: 05/08/22 10:12 PM   Result Value Ref Range Ventricular Rate 116 BPM    Atrial Rate 116 BPM    P-R Interval 126 ms    QRS Duration 80 ms    Q-T Interval 370 ms    QTC Calculation (Bezet) 514 ms    Calculated P Axis 87 degrees    Calculated R Axis 71 degrees    Calculated T Axis 84 degrees    Diagnosis       Sinus tachycardia  Minimal voltage criteria for LVH, may be normal variant ( Sokolow-Mcdonald )  Nonspecific ST abnormality  Abnormal ECG  When compared with ECG of 28-APR-2022 09:45,  MANUAL COMPARISON REQUIRED, DATA IS UNCONFIRMED     AMMONIA    Collection Time: 05/08/22 10:15 PM   Result Value Ref Range    Ammonia, plasma <10 <32 UMOL/L   CBC WITH AUTOMATED DIFF    Collection Time: 05/08/22 10:21 PM   Result Value Ref Range    WBC 15.0 (H) 4.1 - 11.1 K/uL    RBC 2.35 (L) 4.10 - 5.70 M/uL    HGB 9.5 (L) 12.1 - 17.0 g/dL    HCT 28.9 (L) 36.6 - 50.3 %    .0 (H) 80.0 - 99.0 FL    MCH 40.4 (H) 26.0 - 34.0 PG    MCHC 32.9 30.0 - 36.5 g/dL    RDW 15.5 (H) 11.5 - 14.5 %    PLATELET 54 (L) 603 - 400 K/uL    MPV 11.2 8.9 - 12.9 FL    NRBC 0.0 0  WBC    ABSOLUTE NRBC 0.00 0.00 - 0.01 K/uL    NEUTROPHILS 88 (H) 32 - 75 %    LYMPHOCYTES 3 (L) 12 - 49 %    MONOCYTES 7 5 - 13 %    EOSINOPHILS 0 0 - 7 %    BASOPHILS 0 0 - 1 %    IMMATURE GRANULOCYTES 2 (H) 0.0 - 0.5 %    ABS. NEUTROPHILS 13.1 (H) 1.8 - 8.0 K/UL    ABS. LYMPHOCYTES 0.5 (L) 0.8 - 3.5 K/UL    ABS. MONOCYTES 1.1 (H) 0.0 - 1.0 K/UL    ABS. EOSINOPHILS 0.0 0.0 - 0.4 K/UL    ABS. BASOPHILS 0.0 0.0 - 0.1 K/UL    ABS. IMM.  GRANS. 0.3 (H) 0.00 - 0.04 K/UL    DF SMEAR SCANNED      RBC COMMENTS ANISOCYTOSIS  1+        RBC COMMENTS MACROCYTOSIS  2+        RBC COMMENTS JOSESITO CELLS  PRESENT       METABOLIC PANEL, COMPREHENSIVE    Collection Time: 05/08/22 10:21 PM   Result Value Ref Range    Sodium 131 (L) 136 - 145 mmol/L    Potassium 4.2 3.5 - 5.1 mmol/L    Chloride 98 97 - 108 mmol/L    CO2 24 21 - 32 mmol/L    Anion gap 9 5 - 15 mmol/L    Glucose 201 (H) 65 - 100 mg/dL    BUN 34 (H) 6 - 20 MG/DL Creatinine 1.27 0.70 - 1.30 MG/DL    BUN/Creatinine ratio 27 (H) 12 - 20      GFR est AA >60 >60 ml/min/1.73m2    GFR est non-AA 58 (L) >60 ml/min/1.73m2    Calcium 8.5 8.5 - 10.1 MG/DL    Bilirubin, total 20.3 (H) 0.2 - 1.0 MG/DL    ALT (SGPT) 93 (H) 12 - 78 U/L    AST (SGOT) 56 (H) 15 - 37 U/L    Alk. phosphatase 325 (H) 45 - 117 U/L    Protein, total 6.6 6.4 - 8.2 g/dL    Albumin 2.4 (L) 3.5 - 5.0 g/dL    Globulin 4.2 (H) 2.0 - 4.0 g/dL    A-G Ratio 0.6 (L) 1.1 - 2.2     LIPASE    Collection Time: 05/08/22 10:21 PM   Result Value Ref Range    Lipase 865 (H) 73 - 393 U/L   PROTHROMBIN TIME + INR    Collection Time: 05/08/22 10:21 PM   Result Value Ref Range    INR 2.1 (H) 0.9 - 1.1      Prothrombin time 21.1 (H) 9.0 - 11.1 sec   ETHYL ALCOHOL    Collection Time: 05/08/22 10:21 PM   Result Value Ref Range    ALCOHOL(ETHYL),SERUM <10 <10 MG/DL            Opportunity for questions and clarifications were given to the incoming nurse. Patient's bed locked and is in low position, side rails up x2, door open PRN, call bell within reach of patient and patient not in distress.       Signed by: Winston Burdick, RN, TERRY, BSN, [facility]                       5/9/2022 at 12:44 AM

## 2022-05-09 NOTE — PROGRESS NOTES
Pharmacist Note - Vancomycin Dosing    Consult provided for this 59 y.o. male for indication of possible sepsis. Antibiotic regimen(s): Vancomycin and Cefepime  Patient on vancomycin PTA? NO     Recent Labs     22  2221   WBC 15.0*   CREA 1.27   BUN 34*     Frequency of BMP: daily entered per protocol  Height: 190.5 cm  Weight: 84.2 kg  Est CrCl: ~75 ml/min  Temp (24hrs), Av.2 °F (37.3 °C), Min:98.6 °F (37 °C), Max:99.8 °F (37.7 °C)    Cultures:   Blood:  pending   Urine:  pending    MRSA Swab ordered (if applicable)? NO. Entered per protocol     The plan below is expected to result in a target range of AUC/ZACH 400-600    Therapy will be initiated with a loading dose of 2000 mg IV x 1 to be followed by a maintenance dose of 750 mg IV every 12 hours. Pharmacy to follow patient daily and order levels / make dose adjustments as appropriate. *Vancomycin has been dosed used Bayesian kinetics software to target an AUC/ZACH of 400-600, which provides adequate exposure for an assumed infection due to MRSA with an ZACH of 1 or less while reducing the risk of nephrotoxicity as seen with traditional trough based dosing goals.

## 2022-05-09 NOTE — ROUTINE PROCESS
TRANSFER - OUT REPORT:    Verbal report given to Izaiah Saleem (name) on James Belts  being transferred to [phone] (unit) for routine progression of care       Report consisted of patients Situation, Background, Assessment and   Recommendations(SBAR). Information from the following report(s) SBAR, Kardex, ED Summary, MAR, Accordion and Recent Results was reviewed with the receiving nurse. Lines:   Peripheral IV 05/08/22 Right Antecubital (Active)   Site Assessment Clean, dry, & intact 05/08/22 2225   Phlebitis Assessment 0 05/08/22 2225   Infiltration Assessment 0 05/08/22 2225   Dressing Status Clean, dry, & intact 05/08/22 2225   Dressing Type Transparent 05/08/22 2225   Hub Color/Line Status Pink;Flushed;Patent 05/08/22 2225   Action Taken Blood drawn 05/08/22 2225       Peripheral IV 05/09/22 Left Hand (Active)        Opportunity for questions and clarification was provided.       Patient transported with:   [employer]        '

## 2022-05-09 NOTE — PROGRESS NOTES
[place]: Anticipate discharge home with family assistance pending medical progress. Transportation likely in car with family. Reason for Admission:  Acute delirium     RUR Score:    17%              PCP: First and Last name:   Agnes Rosen DO     Name of Practice:    Are you a current patient: Yes/No: yes   Approximate date of last visit: within the last 3 months   Can you participate in a virtual visit if needed:     Do you (patient/family) have any concerns for transition/discharge? None mentioned              Plan for utilizing home health:  N/A     Current Advanced Directive/Advance Care Plan:  Full Code      Healthcare Decision Maker:   Click here to complete [address] including selection of the Healthcare Decision Maker Relationship (ie \"Primary\")          Transition of Care Plan:  CM met with patient and niece at bedside. Patient is alert and oriented x4. Demographics confirmed. Patient currently resides in a one level home with his sister and brother-in-law. There are 4 steps to enter. Physical address is [address]. DME: none. Patient is independent in ADLs/ambulation, but does not drive at this time. Does have access to a wheelchair if needed at discharge. Hx: alcoholic liver cirrhosis, mitral valve prolapse, pulmonary sarcoidosis. Patient is currently working towards getting on the liver transplant list with [facility]. PCP confirmed and pharmacy used is [facility] located at [address] [place] [address] phone: [phone]. No barriers to discharge noted at this time. Emergency contact: Eric Tavarez, sister, [phone]        Care Management Interventions  PCP Verified by CM: Yes (Dr. Shanika Adams)  Mode of Transport at Discharge:  Other (see comment) (in car with family)  MyChart Signup: No  Discharge Durable Medical Equipment: No  Physical Therapy Consult: No  Occupational Therapy Consult: No  Speech Therapy Consult: No  Support Systems: Other Family Member(s)  Confirm Follow Up Transport: Family  The Plan for Transition of Care is Related to the Following Treatment Goals : home with family assistance  Discharge Location  Patient Expects to be Discharged to[de-identified] Home with family assistance     Matilde Greer, [address] [place] [address],[address]  [phone]

## 2022-05-09 NOTE — ED PROVIDER NOTES
47-year-old male presents from home accompanied by his sister with a complaint of confusion and increased sleepiness. Patient has a history of alcoholic liver cirrhosis and is followed by Dr. Sridhar Contreras. He has been on stable dose of lactulose for the past few weeks. The sister reports that sometimes he has good days and sometimes he has bad days but today he was particularly confused and seemed out of it. She also noted that he was sleeping a lot today and urinating more than usual.  He has not been having diarrhea with the lactulose. He denies any chest pain or abdominal pain. No fever, cough, vomiting. Past Medical History:   Diagnosis Date    Cirrhosis ([address]     Mitral valve prolapse     Sarcoidosis, lung ([facility]        Past Surgical History:   Procedure Laterality Date    HX COLONOSCOPY      HX SEPTOPLASTY      CT APPENDECTOMY           History reviewed. No pertinent family history.     Social History     Socioeconomic History    Marital status: SINGLE     Spouse name: Not on file    Number of children: Not on file    Years of education: Not on file    Highest education level: Not on file   Occupational History    Not on file   Tobacco Use    Smoking status: Current Some Day Smoker    Smokeless tobacco: Never Used    Tobacco comment: occassional cigar   Vaping Use    Vaping Use: Former    Substances: Nicotine    Devices: Pre-filled or refillable cartridge   Substance and Sexual Activity    Alcohol use: Not Currently     Alcohol/week: 2.0 standard drinks     Types: 2 Cans of beer per week     Comment: pt states no longer     Drug use: No    Sexual activity: Not on file   Other Topics Concern    Not on file   Social History Narrative    Not on file     Social Determinants of Health     Financial Resource Strain:     Difficulty of Paying Living Expenses: Not on file   Food Insecurity:     Worried About [address] in the Last Year: Not on file    [address] in the Last Year: Not on file   Transportation Needs:     Lack of Transportation (Medical): Not on file    Lack of Transportation (Non-Medical): Not on file   Physical Activity:     Days of Exercise per Week: Not on file    Minutes of Exercise per Session: Not on file   Stress:     Feeling of Stress : Not on file   Social Connections:     Frequency of Communication with Friends and Family: Not on file    Frequency of Social Gatherings with Friends and Family: Not on file    Attends Restoration[employer] Services: Not on file    Active Member of [address] [place] [address] [employer] or Organizations: Not on file    Attends Club or Organization Meetings: Not on file    Marital Status: Not on file   Intimate Partner Violence:     Fear of Current or Ex-Partner: Not on file    Emotionally Abused: Not on file    Physically Abused: Not on file    Sexually Abused: Not on file   Housing Stability:     Unable to Pay for Housing in the Last Year: Not on file    Number of [place] in the Last Year: Not on file    Unstable Housing in the Last Year: Not on file         ALLERGIES: Contrast agent [iodine] and Penicillin g    Review of Systems   Unable to perform ROS: Mental status change       Vitals:    05/08/22 2230 05/08/22 2245 05/08/22 2300 05/09/22 0000   BP: 131/73 137/69 (!) 146/80 139/71   Pulse:  (!) 117 (!) 116 (!) 112   Resp:  15 15 13   Temp:       SpO2:  97% 98% 99%            Physical Exam  Vitals and nursing note reviewed. Constitutional:       General: He is not in acute distress. Appearance: He is well-developed. He is ill-appearing. HENT:      Head: Normocephalic and atraumatic. Eyes:      General: Scleral icterus present. Pupils: Pupils are equal, round, and reactive to light. Cardiovascular:      Rate and Rhythm: Tachycardia present. Pulmonary:      Effort: Pulmonary effort is normal. No respiratory distress. Abdominal:      General: There is no distension. Palpations: Abdomen is soft. Tenderness:  There is no abdominal tenderness. There is no guarding. Musculoskeletal:         General: Normal range of motion. Cervical back: Normal range of motion and neck supple. Skin:     General: Skin is warm and dry. Comments: jaundiced   Neurological:      Mental Status: He is alert and oriented to person, place, and time. He is confused. MDM  Number of Diagnoses or Management Options     Amount and/or Complexity of Data Reviewed  Clinical lab tests: reviewed  Tests in the radiology section of CPT®: reviewed  Tests in the medicine section of CPT®: reviewed      ED Course as of 05/09/22 0113   Sun May 08, 2022   2346 Ammonia, plasma: <10 [JM]      ED Course User Index  [JM] Meron Heath MD     Perfect Serve Consult for Admission  12:42 AM    ED Room Number: ER12/12  Patient Name and age:  Monico Fiore 59 y.o.  male  Working Diagnosis:   1. Hepatic encephalopathy ([facility]    2. Alcoholic cirrhosis of liver without ascites (HCC)    3. Coagulopathy ([address]    4. Leukocytosis, unspecified type    5. Sepsis, due to unspecified organism, unspecified whether acute organ dysfunction present ([address]    6. Urinary tract infection with hematuria, site unspecified        COVID-19 Suspicion:  no  Sepsis present:  no  Reassessment needed: no  Code Status:  Full Code  Readmission: no  Isolation Requirements:  no  Recommended Level of Care:  telemetry  Department:  [facility] Adult ED - [phone]  Admitting Provider: Hospitalist    Other:  Pt with increased confusion and somnolence today. Ammonia level <10. WBC=15 and slightly tach. LFT's all slightly elevated.  + UTI. Total critical care time spent exclusive of procedures:  35 minutes.     Procedures

## 2022-05-09 NOTE — H&P
[facility]  HISTORY AND PHYSICAL    Name:  Jas Abdi  MR#:  [id number]  :  1965  ACCOUNT #:  [de-identified]  ADMIT DATE:  2022      The patient was seen, evaluated, and admitted by me on 2022. PRIMARY CARE PHYSICIAN:  Ayde Espinosa DO    SOURCE OF INFORMATION:  Patient who is not a good historian because of his change in mental status, the patient's spouse who was present at the bedside, and review of ED electronic medical records. CHIEF COMPLAINT:  Confusion. HISTORY OF PRESENT ILLNESS:  This is a 78-year-old man with a past medical history significant for alcoholic liver cirrhosis, mitral valve prolapse, pulmonary sarcoidosis, who was in his usual state of health until the date of presentation at the emergency room when the patient developed confusion. The patient was brought to the emergency room by his spouse. When the patient arrived at the emergency room, CT scan of the head was obtained, this was negative. The patient's urinalysis was consistent with urinary tract infection. His ammonia level is within normal limits. The patient has been taking his medications as prescribed. The patient's spouse called his hepatologist who advised them to come to the emergency room. There was no fever, rigors, or chills reported. The patient denies any nausea or vomiting, also no abdominal pain. No record of prior admission to this hospital.  The patient has alcoholic liver cirrhosis. The patient last consumed alcohol in 2021. The patient is undergoing various evaluation prior to being referred for liver transplant at [facility]. The patient is not able to provide good history because of his confusion. PAST MEDICAL HISTORY:  Alcoholic liver cirrhosis, mitral valve prolapse, and pulmonary sarcoidosis. ALLERGIES:  THE PATIENT IS ALLERGIC TO PENICILLIN AND CONTRAST AGENT. MEDICATIONS:  1. Celexa 10 mg daily. 2.  Lasix 40 mg daily.   3.  Lactulose 30 mL daily.  4.  Protonix 20 mg daily. 5.  Aldactone 100 mg daily. FAMILY HISTORY:  This was reviewed. His father had hypertension. PAST SURGICAL HISTORY:  This is significant for appendectomy and septoplasty. SOCIAL HISTORY:  The patient smokes less than a pack of cigarettes daily, quit alcohol abuse in 11/2021. REVIEW OF SYSTEMS:  Unable to obtain because of the patient's change in mental status. PHYSICAL EXAMINATION:  GENERAL APPEARANCE:  The patient appeared ill, in moderate distress. VITAL SIGNS:  On arrival at the emergency room, temperature 99.8, pulse 123, respiratory rate 18, blood pressure 133/79, and oxygen saturation 99%. HEAD:  Normocephalic, atraumatic. EYES:  Normal eye movement. No redness, no drainage, no discharge. Icteric. NOSE:  No deformity and no drainage. MOUTH AND THROAT:  No visible oral lesion. Dry oral mucosa. NECK:  Neck is supple. No JVD, no thyromegaly. CHEST:  Clear breath sounds. No wheezing, no crackles. HEART:  Normal S1 and S2, regular. No clinically appreciable murmur. ABDOMEN:  Soft, nontender. Normal bowel sounds. CENTRAL NERVOUS SYSTEM:  Lethargic but arousable. No gross focal neurological deficit. EXTREMITIES:  Edema 2+. Pulses 2+ bilaterally. MUSCULOSKELETAL:  No obvious joint deformity or swelling. SKIN:  Jaundice noted. No active skin lesions seen in the exposed parts of the body. PSYCHIATRIC:  Unable to assess mood and affect. LYMPHATIC:  No cervical lymphadenopathy. DIAGNOSTIC DATA:  EKG shows sinus tachycardia and nonspecific ST and T-wave abnormalities. Chest x-ray, no evidence of superimposed acute process. CT scan of the head without contrast, no acute intracranial process. CT scan of the abdomen and pelvis without contrast shows cirrhosis with mild splenomegaly and small ascites, cholecystolithiasis, small bilateral nonobstructing renal calculi. LABORATORY DATA:  Ammonia level less than 10.   Coagulation profile, INR 2.1, PT 21.1. Serum alcohol level less than 10. Chemistry, sodium 131, potassium 4.2, chloride 98, CO2 of 24, glucose 201, BUN 34, creatinine 1.27, calcium 8.5. Total bilirubin 20.3, ALT 93, AST 56, alkaline phosphatase 325, total protein 6.6, albumin level 2.4, globulin 4.2. Lipase level 865. Hematology, WBC 15.0, hemoglobin 9.5, hematocrit 28.9, and platelets 54. Urinalysis, this is significant for negative nitrites, moderate leukocyte esterase, large blood, 3+ bacteria. Lactic acid level 2.3. ASSESSMENT:  1. Acute delirium. 2.  Alcoholic liver cirrhosis without ascites. 3.  Suspected acute pancreatitis. 4.  Tobacco abuse. 5.  Coagulopathy. 6.  Thrombocytopenia. 7.  Hyperglycemia. 8.  Acute cystitis with hematuria. 9.  Anemia. 10.  Hyponatremia. 11.  Suspected sepsis. 12.  Sinus tachycardia. PLAN:  1. Acute delirium. We will admit the patient for further evaluation and treatment. This is most likely due to metabolic event. We will identify and treat underlying etiologic factors including acute cystitis. The patient's ammonia level is within normal limits. The patient stopped alcohol abuse in 11/2021. We will check salicylate level. We will check J85 and folic acid level. The patient may require further evaluation including MRI and Neurology consult if the patient did not return to normal baseline mental status after treatment of the underlying metabolic etiological factors. 2.  Alcoholic liver cirrhosis without ascites. We will resume preadmission medication. Hepatology consult will be requested to assist in further evaluation and treatment. 3.  Acute pancreatitis. The patient presented with elevated lipase level. We will carry out conservative treatment including fluid therapy and pain control. We will monitor the patient's lipase level. We will check C-reactive protein level to assess the severity of the acute pancreatitis.   The patient's CT scan of the abdomen and pelvis shows cholecystolithiasis. General Surgery consult will be requested to assist in evaluation and treatment of suspected gallstone pancreatitis. 4.  Tobacco abuse. The patient advised to quit smoking. The patient does not want to be placed on Nicoderm patch at this time. 5.  Coagulopathy. This is due to the patient's liver disease. We will monitor the patient closely. 6.  Thrombocytopenia. This is also due to liver disease. The patient is asymptomatic. We will continue to monitor and avoid heparin product. 7.  Hyperglycemia. We will check hemoglobin A1c level. The patient has no history of diabetes. 8.  Acute cystitis with hematuria. The patient will be started on antibiotics. This is the most likely cause of the patient's acute delirium. We will await urine culture. 9.  Anemia. This is most likely due to chronic disease. We will carry out anemia workup including checking stool guaiac to rule out occult GI bleed. 10.  Hyponatremia. This is mild and most likely due to volume depletion. We will carry out fluid therapy and repeat sodium level. 11.  Suspected sepsis. The patient presented with change in mental status, leukocytosis, tachycardia, elevated lactic acid level, and urinalysis consistent with urinary tract infection. We will start the patient on vancomycin and Zosyn for the suspected sepsis. The sepsis is most likely coming from urinary tract infection. We will obtain CT scan of the chest to evaluate the patient for other possible sources of sepsis. 12.  Sinus tachycardia. This is most likely due to the sepsis. We will carry out fluid therapy. We will check serial cardiac markers to rule out acute myocardial infarction. We will check TSH level. We will also check urine drug screen. We will check D-dimer to evaluate the patient for thromboembolism as another possible cause of tachycardia at rest.  13.  Other issues:  Code status, the patient is a full code.   We will request SCD for DVT prophylaxis. FUNCTIONAL STATUS PRIOR TO ADMISSION:  The patient came from home. The patient is ambulatory with no assistive device. COVID PRECAUTION:  The patient was wearing a face mask. I was wearing a face mask and gloves for this patient's encounter. Sepsis reassessment completed: Patient has normal capillary refills, improved cardiopulmonary examination and moist mucous membranes.         Marlene Maurer MD      [id number]  D:  05/09/2022 3:44  T:  05/09/2022 4:51  JOB #:  [id number]  CC:  Haja Ochoa DO

## 2022-05-09 NOTE — CONSULTS
CONSULT  HOSPITAL     Subjective:      Janna Fox is a 59 y.o. male who presents for evaluation of encephalopathy, and was noted incidentally to have a lipase elevation of 500 and gallstones. There was no biliary dilation, bilirubin 18 consistent with his known cirrhosis. Patient has significant liver disease with cirrhosis, thrombocytopenia of 30,000, coagulopathy pro time INR of 2, apparently is working towards transplant candidacy I believe at [facility]. Patient and his sister who was present and patient agreed to have his sister present during discussion, indicates patient denies any significant abdominal pain, apparently has had multiple ascites drainage procedures by ultrasound. She had was admitted primarily for encephalopathy issues.   Patient's hematologist is Dr. Jason Ribeiro Results (from the past 50 hour(s))   EKG, 12 LEAD, INITIAL    Collection Time: 05/08/22 10:12 PM   Result Value Ref Range    Ventricular Rate 116 BPM    Atrial Rate 116 BPM    P-R Interval 126 ms    QRS Duration 80 ms    Q-T Interval 370 ms    QTC Calculation (Bezet) 514 ms    Calculated P Axis 87 degrees    Calculated R Axis 71 degrees    Calculated T Axis 84 degrees    Diagnosis       Sinus tachycardia  Minimal voltage criteria for LVH, may be normal variant ( Sokolow-Mcdonald )  Nonspecific ST abnormality  Abnormal ECG  When compared with ECG of 28-APR-2022 09:45,  MANUAL COMPARISON REQUIRED, DATA IS UNCONFIRMED     AMMONIA    Collection Time: 05/08/22 10:15 PM   Result Value Ref Range    Ammonia, plasma <10 <32 UMOL/L   CBC WITH AUTOMATED DIFF    Collection Time: 05/08/22 10:21 PM   Result Value Ref Range    WBC 15.0 (H) 4.1 - 11.1 K/uL    RBC 2.35 (L) 4.10 - 5.70 M/uL    HGB 9.5 (L) 12.1 - 17.0 g/dL    HCT 28.9 (L) 36.6 - 50.3 %    .0 (H) 80.0 - 99.0 FL    MCH 40.4 (H) 26.0 - 34.0 PG    MCHC 32.9 30.0 - 36.5 g/dL    RDW 15.5 (H) 11.5 - 14.5 %    PLATELET 54 (L) 707 - 400 K/uL    MPV 11.2 8.9 - 12.9 FL    NRBC 0.0 0  WBC    ABSOLUTE NRBC 0.00 0.00 - 0.01 K/uL    NEUTROPHILS 88 (H) 32 - 75 %    LYMPHOCYTES 3 (L) 12 - 49 %    MONOCYTES 7 5 - 13 %    EOSINOPHILS 0 0 - 7 %    BASOPHILS 0 0 - 1 %    IMMATURE GRANULOCYTES 2 (H) 0.0 - 0.5 %    ABS. NEUTROPHILS 13.1 (H) 1.8 - 8.0 K/UL    ABS. LYMPHOCYTES 0.5 (L) 0.8 - 3.5 K/UL    ABS. MONOCYTES 1.1 (H) 0.0 - 1.0 K/UL    ABS. EOSINOPHILS 0.0 0.0 - 0.4 K/UL    ABS. BASOPHILS 0.0 0.0 - 0.1 K/UL    ABS. IMM. GRANS. 0.3 (H) 0.00 - 0.04 K/UL    DF SMEAR SCANNED      RBC COMMENTS ANISOCYTOSIS  1+        RBC COMMENTS MACROCYTOSIS  2+        RBC COMMENTS JOSESITO CELLS  PRESENT       METABOLIC PANEL, COMPREHENSIVE    Collection Time: 05/08/22 10:21 PM   Result Value Ref Range    Sodium 131 (L) 136 - 145 mmol/L    Potassium 4.2 3.5 - 5.1 mmol/L    Chloride 98 97 - 108 mmol/L    CO2 24 21 - 32 mmol/L    Anion gap 9 5 - 15 mmol/L    Glucose 201 (H) 65 - 100 mg/dL    BUN 34 (H) 6 - 20 MG/DL    Creatinine 1.27 0.70 - 1.30 MG/DL    BUN/Creatinine ratio 27 (H) 12 - 20      GFR est AA >60 >60 ml/min/1.73m2    GFR est non-AA 58 (L) >60 ml/min/1.73m2    Calcium 8.5 8.5 - 10.1 MG/DL    Bilirubin, total 20.3 (H) 0.2 - 1.0 MG/DL    ALT (SGPT) 93 (H) 12 - 78 U/L    AST (SGOT) 56 (H) 15 - 37 U/L    Alk.  phosphatase 325 (H) 45 - 117 U/L    Protein, total 6.6 6.4 - 8.2 g/dL    Albumin 2.4 (L) 3.5 - 5.0 g/dL    Globulin 4.2 (H) 2.0 - 4.0 g/dL    A-G Ratio 0.6 (L) 1.1 - 2.2     LIPASE    Collection Time: 05/08/22 10:21 PM   Result Value Ref Range    Lipase 865 (H) 73 - 393 U/L   PROTHROMBIN TIME + INR    Collection Time: 05/08/22 10:21 PM   Result Value Ref Range    INR 2.1 (H) 0.9 - 1.1      Prothrombin time 21.1 (H) 9.0 - 11.1 sec   ETHYL ALCOHOL    Collection Time: 05/08/22 10:21 PM   Result Value Ref Range    ALCOHOL(ETHYL),SERUM <10 <10 MG/DL   URINALYSIS W/MICROSCOPIC    Collection Time: 05/09/22 12:49 AM   Result Value Ref Range    Color DARK YELLOW      Appearance CLOUDY (A) CLEAR      Specific gravity 1.017 1.003 - 1.030      pH (UA) 5.5 5.0 - 8.0      Protein Negative NEG mg/dL    Glucose Negative NEG mg/dL    Ketone Negative NEG mg/dL    Blood LARGE (A) NEG      Urobilinogen 1.0 0.2 - 1.0 EU/dL    Nitrites Negative NEG      Leukocyte Esterase MODERATE (A) NEG      WBC 10-20 0 - 4 /hpf    RBC 0-5 0 - 5 /hpf    Epithelial cells FEW FEW /lpf    Bacteria 3+ (A) NEG /hpf   BILIRUBIN, CONFIRM    Collection Time: 05/09/22 12:49 AM   Result Value Ref Range    Bilirubin UA, confirm Positive (A) NEG     LACTIC ACID    Collection Time: 05/09/22  1:16 AM   Result Value Ref Range    Lactic acid 2.3 (HH) 0.4 - 2.0 MMOL/L   METABOLIC PANEL, COMPREHENSIVE    Collection Time: 05/09/22  4:36 AM   Result Value Ref Range    Sodium 134 (L) 136 - 145 mmol/L    Potassium 3.8 3.5 - 5.1 mmol/L    Chloride 104 97 - 108 mmol/L    CO2 22 21 - 32 mmol/L    Anion gap 8 5 - 15 mmol/L    Glucose 129 (H) 65 - 100 mg/dL    BUN 30 (H) 6 - 20 MG/DL    Creatinine 0.93 0.70 - 1.30 MG/DL    BUN/Creatinine ratio 32 (H) 12 - 20      GFR est AA >60 >60 ml/min/1.73m2    GFR est non-AA >60 >60 ml/min/1.73m2    Calcium 7.6 (L) 8.5 - 10.1 MG/DL    Bilirubin, total 18.0 (H) 0.2 - 1.0 MG/DL    ALT (SGPT) 78 12 - 78 U/L    AST (SGOT) 49 (H) 15 - 37 U/L    Alk.  phosphatase 288 (H) 45 - 117 U/L    Protein, total 5.8 (L) 6.4 - 8.2 g/dL    Albumin 2.2 (L) 3.5 - 5.0 g/dL    Globulin 3.6 2.0 - 4.0 g/dL    A-G Ratio 0.6 (L) 1.1 - 2.2     CBC WITH AUTOMATED DIFF    Collection Time: 05/09/22  4:36 AM   Result Value Ref Range    WBC 12.3 (H) 4.1 - 11.1 K/uL    RBC 2.09 (L) 4.10 - 5.70 M/uL    HGB 8.5 (L) 12.1 - 17.0 g/dL    HCT 25.5 (L) 36.6 - 50.3 %    .0 (H) 80.0 - 99.0 FL    MCH 40.7 (H) 26.0 - 34.0 PG    MCHC 33.3 30.0 - 36.5 g/dL    RDW 15.9 (H) 11.5 - 14.5 %    PLATELET 39 (LL) 138 - 400 K/uL    MPV 11.3 8.9 - 12.9 FL    NRBC 0.2 (H) 0  WBC    ABSOLUTE NRBC 0.02 (H) 0.00 - 0.01 K/uL    NEUTROPHILS 89 (H) 32 - 75 %    LYMPHOCYTES 4 (L) 12 - 49 % MONOCYTES 5 5 - 13 %    EOSINOPHILS 1 0 - 7 %    BASOPHILS 0 0 - 1 %    MYELOCYTES 1 (H) 0 %    IMMATURE GRANULOCYTES 0 %    ABS. NEUTROPHILS 10.9 (H) 1.8 - 8.0 K/UL    ABS. LYMPHOCYTES 0.5 (L) 0.8 - 3.5 K/UL    ABS. MONOCYTES 0.6 0.0 - 1.0 K/UL    ABS. EOSINOPHILS 0.1 0.0 - 0.4 K/UL    ABS. BASOPHILS 0.0 0.0 - 0.1 K/UL    ABS. IMM.  GRANS. 0.0 K/UL    DF MANUAL      PLATELET COMMENTS Large Platelets      RBC COMMENTS ANISOCYTOSIS  1+        RBC COMMENTS MACROCYTOSIS  2+        RBC COMMENTS JOSESITO CELLS  PRESENT        RBC COMMENTS POLYCHROMASIA  PRESENT       TSH 3RD GENERATION    Collection Time: 05/09/22  4:36 AM   Result Value Ref Range    TSH 0.67 0.36 - 3.74 uIU/mL   MAGNESIUM    Collection Time: 05/09/22  4:36 AM   Result Value Ref Range    Magnesium 1.8 1.6 - 2.4 mg/dL   PHOSPHORUS    Collection Time: 05/09/22  4:36 AM   Result Value Ref Range    Phosphorus 2.3 (L) 2.6 - 4.7 MG/DL   LIPASE    Collection Time: 05/09/22  4:36 AM   Result Value Ref Range    Lipase 596 (H) 73 - 393 U/L   LIPID PANEL    Collection Time: 05/09/22  4:36 AM   Result Value Ref Range    Cholesterol, total 76 <200 MG/DL    Triglyceride 73 <150 MG/DL    HDL Cholesterol 57 MG/DL    LDL, calculated 4.4 0 - 100 MG/DL    VLDL, calculated 14.6 MG/DL    CHOL/HDL Ratio 1.3 0.0 - 5.0     FOLATE    Collection Time: 05/09/22  4:36 AM   Result Value Ref Range    Folate 11.5 5.0 - 21.0 ng/mL   VITAMIN B12    Collection Time: 05/09/22  4:36 AM   Result Value Ref Range    Vitamin B12 1,545 (H) 193 - 986 pg/mL   ACETAMINOPHEN    Collection Time: 05/09/22  4:36 AM   Result Value Ref Range    Acetaminophen level <2 (L) 10 - 30 ug/mL   SALICYLATE    Collection Time: 05/09/22  4:36 AM   Result Value Ref Range    Salicylate level <3.9 (L) 2.8 - 20.0 MG/DL   IRON PROFILE    Collection Time: 05/09/22  4:36 AM   Result Value Ref Range    Iron 94 35 - 150 ug/dL    TIBC 218 (L) 250 - 450 ug/dL    Iron % saturation 43 20 - 50 %   FERRITIN    Collection Time: 05/09/22  4:36 AM   Result Value Ref Range    Ferritin 514 (H) 26 - 388 NG/ML   TROPONIN-HIGH SENSITIVITY    Collection Time: 05/09/22  4:36 AM   Result Value Ref Range    Troponin-High Sensitivity 21 0 - 76 ng/L   NT-PRO BNP    Collection Time: 05/09/22  4:36 AM   Result Value Ref Range    NT pro- (H) <125 PG/ML   LACTIC ACID    Collection Time: 05/09/22  4:36 AM   Result Value Ref Range    Lactic acid 2.2 (HH) 0.4 - 2.0 MMOL/L   D DIMER    Collection Time: 05/09/22  4:36 AM   Result Value Ref Range    D-dimer 5.41 (H) 0.00 - 0.65 mg/L FEU   C REACTIVE PROTEIN, QT    Collection Time: 05/09/22  4:36 AM   Result Value Ref Range    C-Reactive protein 1.51 (H) 0.00 - 0.60 mg/dL   DRUG SCREEN, URINE    Collection Time: 05/09/22  4:38 AM   Result Value Ref Range    AMPHETAMINES Negative NEG      BARBITURATES Negative NEG      BENZODIAZEPINES Negative NEG      COCAINE Negative NEG      METHADONE Negative NEG      OPIATES Negative NEG      PCP(PHENCYCLIDINE) Negative NEG      THC (TH-CANNABINOL) Negative NEG      Drug screen comment (NOTE)        XR Results (maximum last 3): Results from [place] encounter on 05/08/22    XR CHEST PORT    Impression  Unchanged chronic interstitial opacities in both lungs. No evidence  of superimposed acute process. Results from [place] encounter on 03/03/22    XR CHEST PA LAT    Impression  1. No acute cardiopulmonary process. 2. Stable chronic interstitial lung disease and calcified hilar and mediastinal  lymph nodes, compatible with sarcoidosis. Results from Hospital Encounter encounter on 04/12/17    XR CHEST PORT    Impression  IMPRESSION:    No acute process on portable chest. Chronic interstitial lung disease and hilar  fullness are compatible with the provided diagnosis of sarcoidosis. CT Results (maximum last 3): Results from [place] encounter on 05/08/22    CT HEAD WO CONT    Impression  No acute intracranial process.       CT ABD PELV WO CONT    Impression  Cirrhosis with mild splenomegaly and small ascites. Cholecystolithiasis. Small  bilateral nonobstructing renal calculi. Results from [place] encounter on 03/10/22    CT CHEST W CONT    Impression  1. Lungs demonstrate chronic changes of sarcoidosis with scarring volume loss  retraction bronchiectasis in both upper lobes extending from the hilar regions  posteriorly. This is similar to the previous examination. Extensive calcified  lymph nodes are unchanged. 2. There is findings consistent with cirrhosis. There is a large amount of  ascites present. There are extensive gastroesophageal varices. There appears to  be a splenorenal shunt. Please see above text      MRI Results (maximum last 3): Results from [place] encounter on 05/03/22    MRI ABD W WO CONT    Impression  1. Cirrhosis. No concerning hepatic mass. 2. Portal hypertension: small to moderate volume of ascites, recanalized  umbilical vein, paraesophageal varices, IMV to right gonadal vein shunt. Results from [place] encounter on 06/04/11    MRI [address] [id number]      Nuclear Medicine Results (maximum last 3): No results found for this or any previous visit. US Results (maximum last 3): Results from [place] encounter on 04/11/22    US PARACENTESIS ABD W IMAGING    Impression  Technically successful ultrasound guided paracentesis. Results from [place] encounter on 03/14/22    US ABD COMP    Impression  Heterogeneous nodular liver without evidence for abnormal mass. Contracted gallbladder with stones and sludge. No evidence for cholecystitis. Mild splenomegaly. Small amount of ascites. Bilateral nonobstructing renal calculi and small cysts      Results from [place] encounter on 06/02/20    US SCROTUM/TESTICLES    Impression  IMPRESSION:  1. Right epididymoorchitis. 2. Bilateral varicoceles.     23X      Past Medical History:   Diagnosis Date    [facility] [address]     Mitral valve prolapse     Sarcoidosis, lung ([facility] [address]      Past Surgical History:   Procedure Laterality Date    HX COLONOSCOPY      HX SEPTOPLASTY      FL APPENDECTOMY        History reviewed. No pertinent family history. Social History     Tobacco Use    Smoking status: Current Some Day Smoker    Smokeless tobacco: Never Used    Tobacco comment: occassional cigar   Substance Use Topics    Alcohol use: Not Currently     Alcohol/week: 2.0 standard drinks     Types: 2 Cans of beer per week     Comment: pt states no longer       Prior to Admission medications    Medication Sig Start Date End Date Taking? Authorizing Provider   methylPREDNISolone (MEDROL) 32 mg tablet Take 1 tablet by mouth 12 hours and 2 hours before contrast media injection. 4/22/22   Luis Mcgee MD   lactulose (CHRONULAC) 10 gram/15 mL solution Take 30 mL by mouth daily. 4/22/22   Luis Mcgee MD   citalopram (CELEXA) 10 mg tablet  4/15/22   Provider, Historical   levocetirizine (Xyzal) 5 mg tablet Take 5 mg by mouth daily as needed for Allergies. Provider, Historical   spironolactone (Aldactone) 100 mg tablet Take 1 Tablet by mouth daily. 4/7/22   Luis Mcgee MD   furosemide (Lasix) 40 mg tablet Take 1 Tablet by mouth daily. 4/7/22   Luis Mcgee MD   pantoprazole (Protonix) 20 mg tablet Take 2 Tablets by mouth daily. 4/7/22   Luis Mcgee MD   cefUROXime (Ceftin) 500 mg tablet Take 500 mg by mouth two (2) times a day. Patient not taking: Reported on 4/7/2022 9/20/19   Provider, Historical      Allergies   Allergen Reactions    Contrast Agent [Iodine] Itching    Penicillin G Hives     30 years ago        Review of Systems   Constitutional: Positive for fatigue. Respiratory: Negative. Cardiovascular: Negative. Gastrointestinal: Negative. Psychiatric/Behavioral: Positive for confusion.    All other systems reviewed and are negative. Objective:     Patient Vitals for the past 8 hrs:   BP Temp Pulse Resp SpO2 Weight   22 1000 -- -- 100 -- -- --   22 0836 (!) 145/60 98.5 °F (36.9 °C) (!) 101 12 99 % --   22 0732 124/65 -- 99 12 93 % --   22 0700 (!) 111/59 -- 98 10 93 % --   22 0644 122/61 -- 97 10 93 % --   22 0300 131/67 -- (!) 107 13 96 % --   22 0239 -- -- -- -- -- 84.2 kg (185 lb 10 oz)   22 0236 (!) 122/59 -- (!) 105 12 92 % --       Temp (24hrs), Av °F (37.2 °C), Min:98.5 °F (36.9 °C), Max:99.8 °F (37.7 °C)      Physical Exam  Vitals and nursing note reviewed. Exam conducted with a chaperone present (NP Bc Coreas and family sister). Constitutional:       Comments: Patient appears jaundiced and tired and sleepy   HENT:      Head: Normocephalic. Mouth/Throat:      Pharynx: Oropharynx is clear. Eyes:      General: Scleral icterus present. Pulmonary:      Effort: Pulmonary effort is normal.   Abdominal:      Comments: Distended with ascites but nontender no peritoneal signs or guarding, has ascites, reducible   Skin:     General: Skin is warm and dry. Coloration: Skin is jaundiced. Neurological:      General: No focal deficit present. Mental Status: He is alert. Psychiatric:         Mood and Affect: Mood normal.      Comments: Patient alert and seems cooperative and somewhat confused         Assessment:     Principal Problem:    Acute delirium (2022)    Active Problems:    Cirrhosis ([address] ()      Calculus of gallbladder without cholecystitis without obstruction (2022)        Plan:     Although patient has gallstones, I do not think his gallstones are the source of any symptoms at this point, he could have some chronic pancreatitis but doubt biliary obstruction.   If there is more concern for this I would defer to GI Dr. Daniela Taylor whether to get MRCP or not but patient would be a prohibitive risk for any invasive procedure such as cholecystectomy or even cholecystostomy tube at this point and abdomen symptoms and exam seem to be fairly benign. Would not recommend any further work-up at this point and continue with hepatic transplant work-up. Any elective or emergent intervention in the abdomen at this point would carry high morbidity and mortality.     Surgery is available if needed, please call us back if further questions    [address] [facility] time including review of any indicated imaging, discussion with patient, and other providers, exam and discussion with patient: 45      minutes    Signed By: Matthieu Robb MD   [facility]    May 9, 2022

## 2022-05-09 NOTE — ED TRIAGE NOTES
Pt arrives with CC of liver failure and AMS, stating he has alcohol related liver cirrhosis and is seen by Dr. Lenard Barragan. He states he is coughing up some blood and appears jaundiced in triage.

## 2022-05-09 NOTE — CONSULTS
[facility], Wong SHETH, Yazan Tranrui [place], [place]      DANNA Gil, [facility]     April S Enma, [facility]   FIDELINA Marley, [facility]       Elisabeth Cantu [facility] [address] 136    at [address], [facility] Rob De La Cruz  22.    [phone]    FAX: [address] [place] [address] Avenue    at [address], [address]    [phone]    FAX: [phone]         HEPATOLOGY CONSULT NOTE  The patient is well known to and regularly cared for at [place] [address]. He is a 59 y.o.  male who was found to have cirrhosis in 11/2018 when he was hospitalized with jaundice. Serologic evaluation for markers of chronic liver disease was negative. He had been consuming a 1/5 bottle of alcohol daily. He became abstinent from alcohol in 11/2021. He has not previous developed any of the complications of cirrhosis. He was brought to the ED by his family after he developed confusion. In the ED Laboratory studies were significant for:    WBC 15, HB 9.5 gms, PLT 54, Sna 131, Scr 1.27 mg, TBILI 20 mg, INR 2.1, Sammonia 10,   UA was significant for blood, LE moderate, 10-20 WBC, 3+ bacteria    Imaging of the liver with CT scan demonstrated cirrhosis, no liver mass, minimal ascites. A CT scan of the head demonstrated no acute process. I have reviewed the Emergency room note, Hospital admission note, Notes by all other physicians who have seen the patient during this hospitalization to date. I have reviewed the problem list, all past medical history and the reason for this hospitalization. I have reviewed the allergies and the medications the patient was taking at home prior to this hospitalization.     In his room this evening he is alert, oriented and can carry on conversation. ON exam there is no obvious ascites, edema or asterixis. Hepatology Plan:  Continue IV ABX and nasrin based upon results of blood and urine cultures. Continue IV albumin for now. Since Scr and Sna now both normal can stop IV albumin if blood cultures negative     ASSESSMENT AND PLAN:  Cirrhosis  The diagnosis of cirrhosis is based upon imaging, laboratory studies, complications of cirrhosis. 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        The CTP is 11. Child class C. The MELD score is up to 27.     He has completed nearly all testing required to be considered for liver transplant.     Alcohol liver disease  The diagnosis is based upon a history of consuming significant alcohol on a daily basis for many years, pattern of AST>ALT, an elevation in ferritin and FE saturation, serology that is negative for other causes of chronic liver disease.       The histologic severity has not been defined.       The patient has consumed alcoholic in excess for many years. He reduced alcohol use to 6 drinks per day when he found out he liver disease. The patient has been abstinent from alcohol since 11/2021 when he was hospitalized for jaundice. Discussed the need to reman abstinent from alcohol. A PEth test in 4/2020 was negative.       Elevation in Ferritin  There is an elevation in ferritin with elevated iron saturation. HFE genetic testing was negative  The elevation in ferritin may be secondary to alcohol use and will come down to normal with abstinence.     Ascites   Ascites developed for the first time in 5/2022. Ascites was small-moderate and seen on imaging but not clinically apparent. He is not on diuretics. He has never had Paracentesis and does not need this now. Screening for Esophageal varices   The patient does not have esophageal or gastric varices. The last EGD to assess for varices was performed in 4/2022.       Hepatic encephalopathy   The patient was brought to the ED for confusion but ammonia was <10  CT scan of the head was negative for any acute event. Confusion was due to UTI and has resolved with hydration and ABX. He was started on lactulose once every day which is fine. UTI  Blood and urine culture are pending. He is on IV ABX.     Thrombocytopenia   This is secondary to cirrhosis. There is no evidence of overt bleeding. No treatment is required. The platelet count is adequate for the patient to undergo procedures without the need for platelet transfusion or platelet growth factors.     Anemia   This is due to multifactorial causes including portal hypertension with chronic GI blood loss, bone marrow suppression secondary to alcohol. The most recent FE studies were from 3/2022. The serum ferritin is 937  The FE saturation is 87  Will schedule for EGD to assess for UGI blood loss.       Screening for Hepatocellular Carcinoma  HCC screening has not been not been performed   Will perform liver US to screen for [address]     Treatment of other medical problems in patients with chronic liver disease  There are no contraindications for the patient to take most medications that are necessary for treatment of other medical issues.     The patient has cirrhrosis and should avoid taking NSAIDs which are associated with a higher rate of developing GERALDINE.         The patient can take any medications utilized for treatment of DM, statins to treat hypercholesterolemia     The patient consumes alcohol on a daily basis or has recently stopped consuming alcohol. Regular alcohol use increases the risk of toxicity from acetaminophen. This analgesic should be avoided until the patient has been abstinent from alcohol for 6 months. SYSTEM REVIEW:  Constitution systems: Negative for fever, chills, weight gain, weight loss. Eyes: Negative for visual changes. ENT: Negative for sore throat, painful swallowing.    Respiratory: Negative for cough, hemoptysis, SOB.   Cardiology: Negative for chest pain, palpitations. GI:  Negative for constipation or diarrhea. : Negative for urinary frequency, dysuria, hematuria, nocturia. Skin: Negative for rash. Hematology: Negative for easy bruising, blood clots. Musculo-skelatal: Negative for back pain, muscle pain, weakness. Neurologic: Negative for headaches, dizziness, vertigo, memory problems not related to HE. Psychology: Negative for anxiety, depression. FAMILY HISTORY:  The father  of AAA. The mother  of CVA. There is no family history of liver disease.       SOCIAL HISTORY:  The patient has never been . The patient has no children. The patient stopped using tobacco products in     The patient had been consuming 1/5 of alcohol per day. He reduced this to 6 alcoholic beverages per day in 2018 when he found out he had liver disease. The patient has been abstinent from alcohol since 2021. The patient used to work as  for the [employer]. PHYSICAL EXAMINATION:  VS: per nursing note  General:  No acute distress. Eyes:  Sclera deeply icteric  ENT:  No oral lesions. Thyroid normal.  Nodes:  No adenopathy. Skin:  Spider angiomata. Jaundice. Respiratory:  Lungs clear to auscultation. Cardiovascular:  Regular heart rate. Abdomen:  Soft non-tender, No obvious ascites. Some ascites may be present  Extremities:  No lower extremity edema. Neurologic:  Alert and oriented. Cranial nerves grossly intact. No asterixis. LABORATORY:  Results for Cole Holder (MRN [id number]) as of 2022 19:39   Ref.  Range 2022 22:21 2022 04:36   WBC Latest Ref Range: 4.1 - 11.1 K/uL 15.0 (H) 12.3 (H)   HGB Latest Ref Range: 12.1 - 17.0 g/dL 9.5 (L) 8.5 (L)   PLATELET Latest Ref Range: 150 - 400 K/uL 54 (L) 39 (LL)   INR Latest Ref Range: 0.9 - 1.1   2.1 (H)    Sodium Latest Ref Range: 136 - 145 mmol/L 131 (L) 134 (L)   Potassium Latest Ref Range: 3.5 - 5.1 mmol/L 4.2 3.8   Chloride Latest Ref Range: 97 - 108 mmol/L 98 104   CO2 Latest Ref Range: 21 - 32 mmol/L 24 22   Glucose Latest Ref Range: 65 - 100 mg/dL 201 (H) 129 (H)   BUN Latest Ref Range: 6 - 20 MG/DL 34 (H) 30 (H)   Creatinine Latest Ref Range: 0.70 - 1.30 MG/DL 1.27 0.93   Bilirubin, total Latest Ref Range: 0.2 - 1.0 MG/DL 20.3 (H) 18.0 (H)   Albumin Latest Ref Range: 3.5 - 5.0 g/dL 2.4 (L) 2.2 (L)   ALT Latest Ref Range: 12 - 78 U/L 93 (H) 78   AST Latest Ref Range: 15 - 37 U/L 56 (H) 49 (H)   Alk. phosphatase Latest Ref Range: 45 - 117 U/L 325 (H) 288 (H)   Ammonia, plasma Latest Ref Range: <32 UMOL/L <10        RADIOLOGY:  CT scan abdomen without IV contrast.  Changes consistent with cirrhosis. No liver mass lesions. No dilated bile ducts. Small ascites. Head CT. No acute process.       Curry Garcia MD  [facility] 13  [address], [address] [facility] 22.  [phone]  [facility] [address]

## 2022-05-10 NOTE — PROGRESS NOTES
Hospitalist Progress Note      Hospital summary: This is a 25-year-old man with a past medical history significant for alcoholic liver cirrhosis, mitral valve prolapse, pulmonary sarcoidosis, who was in his usual state of health until the date of presentation at the emergency room when the patient developed confusion. The patient was brought to the emergency room by his spouse. When the patient arrived at the emergency room, CT scan of the head was obtained, this was negative. The patient's urinalysis was consistent with urinary tract infection. His ammonia level is within normal limits. The patient has been taking his medications as prescribed. The patient's spouse called his hepatologist who advised them to come to the emergency room. There was no fever, rigors, or chills reported. The patient denies any nausea or vomiting, also no abdominal pain. No record of prior admission to this hospital.  The patient has alcoholic liver cirrhosis. The patient last consumed alcohol in 11/2021. The patient is undergoing various evaluation prior to being referred for liver transplant at [facility]. The patient is not able to provide good history because of his confusion. 5/8/2022      Assessment/Plan:    Sepsis on poa   GNR bacteremia   -  presented with change in mental status, leukocytosis, tachycardia, elevated lactic acid level  - urinalysis consistent with urinary tract infection. - blood : 1 of 2 growing GNRs  - urine cx : GNRs  - rpt blood cx sent  - appreciate ID input  - echo ordered   - c/w IV cefepime     Acute metabolic encephalopathy - improved  Likely due to sepis  - normal ammonia level, salicylate level, O70 and folic acid level. - UDS- negative. TSH normal   - supportive care     Alcoholic liver cirrhosis without ascites. Elevated LFTs  - on lactulose  - appreciate Hepatology input   - pt on transplant list     Elevated Lipase   - no abd pain.    - CT abd: Cirrhosis with mild splenomegaly and small ascites. Cholecystolithiasis. Small bilateral nonobstructing renal calculi. - started on CLD, advance as tolerated   - appreciate gen surgery input    Coagulopathy/ Thrombocytopenia  - due to liver cirrhosis. Will monitor     Anemia -likely due to chronic disease.    - monitor hb, transfuse <7     Tobacco abuse. - counseled to quit. patient does not want Nicoderm patch at this time. Code status: Full  DVT prophylaxis: SCDs  Disposition: TBD. Home likely when ready. PT/OT eval  ----------------------------------------------    CC: AMS     S: Patient is seen and examined at bedside this AM. He feels ok, alert and oriented. Explained positive blood cx. Discussed with nursing     Review of Systems:  A comprehensive review of systems was negative.     O:  Visit Vitals  BP (!) 144/69 (BP 1 Location: Left upper arm, BP Patient Position: At rest)   Pulse 90   Temp 98.1 °F (36.7 °C)   Resp 13   Wt 84.2 kg (185 lb 10 oz)   SpO2 97%   BMI 23.20 kg/m²       PHYSICAL EXAM:  Gen: NAD  HEENT: icterus+, oropharynx clear, MM moist  Neck: supple, trachea midline, no adenopathy  Heart: RRR, no MRG, no JVD, no peripheral edema  Lungs: CTA b/l, non-labored respirations  Abd: soft, NT, ND, BS+, no organomegaly  Extr: warm  Skin: dry, no rash  Neuro: CN II-XII grossly intact, normal speech, moves all extremities  Psych: normal mood, appropriate affect      Intake/Output Summary (Last 24 hours) at 5/10/2022 1657  Last data filed at 5/10/2022 0758  Gross per 24 hour   Intake 500 ml   Output 600 ml   Net -100 ml        Recent labs & imaging reviewed:  Recent Results (from the past 24 hour(s))   METABOLIC PANEL, BASIC    Collection Time: 05/10/22  3:24 AM   Result Value Ref Range    Sodium 134 (L) 136 - 145 mmol/L    Potassium 4.1 3.5 - 5.1 mmol/L    Chloride 107 97 - 108 mmol/L    CO2 18 (L) 21 - 32 mmol/L    Anion gap 9 5 - 15 mmol/L    Glucose 114 (H) 65 - 100 mg/dL    BUN 26 (H) 6 - 20 MG/DL Creatinine 0.83 0.70 - 1.30 MG/DL    BUN/Creatinine ratio 31 (H) 12 - 20      GFR est AA >60 >60 ml/min/1.73m2    GFR est non-AA >60 >60 ml/min/1.73m2    Calcium 7.6 (L) 8.5 - 10.1 MG/DL   CBC WITH AUTOMATED DIFF    Collection Time: 05/10/22  3:24 AM   Result Value Ref Range    WBC 9.5 4.1 - 11.1 K/uL    RBC 1.90 (L) 4.10 - 5.70 M/uL    HGB 7.7 (L) 12.1 - 17.0 g/dL    HCT 23.8 (L) 36.6 - 50.3 %    .3 (H) 80.0 - 99.0 FL    MCH 40.5 (H) 26.0 - 34.0 PG    MCHC 32.4 30.0 - 36.5 g/dL    RDW 15.7 (H) 11.5 - 14.5 %    PLATELET 36 (LL) 038 - 400 K/uL    MPV 11.9 8.9 - 12.9 FL    NRBC 0.0 0  WBC    ABSOLUTE NRBC 0.00 0.00 - 0.01 K/uL    NEUTROPHILS 86 (H) 32 - 75 %    LYMPHOCYTES 4 (L) 12 - 49 %    MONOCYTES 8 5 - 13 %    EOSINOPHILS 1 0 - 7 %    BASOPHILS 0 0 - 1 %    IMMATURE GRANULOCYTES 1 (H) 0.0 - 0.5 %    ABS. NEUTROPHILS 8.1 (H) 1.8 - 8.0 K/UL    ABS. LYMPHOCYTES 0.4 (L) 0.8 - 3.5 K/UL    ABS. MONOCYTES 0.8 0.0 - 1.0 K/UL    ABS. EOSINOPHILS 0.1 0.0 - 0.4 K/UL    ABS. BASOPHILS 0.0 0.0 - 0.1 K/UL    ABS. IMM.  GRANS. 0.1 (H) 0.00 - 0.04 K/UL    DF SMEAR SCANNED      RBC COMMENTS ANISOCYTOSIS  1+        RBC COMMENTS MACROCYTOSIS  2+        RBC COMMENTS JOSESITO CELLS  1+        RBC COMMENTS SCHISTOCYTES  PRESENT         Recent Labs     05/10/22  0324 05/09/22  0436   WBC 9.5 12.3*   HGB 7.7* 8.5*   HCT 23.8* 25.5*   PLT 36* 39*     Recent Labs     05/10/22  0324 05/09/22 0436 05/08/22 2221   * 134* 131*   K 4.1 3.8 4.2    104 98   CO2 18* 22 24   BUN 26* 30* 34*   CREA 0.83 0.93 1.27   * 129* 201*   CA 7.6* 7.6* 8.5   MG  --  1.8  --    PHOS  --  2.3*  --      Recent Labs     05/09/22 0436 05/08/22 2221   ALT 78 93*   * 325*   TBILI 18.0* 20.3*   TP 5.8* 6.6   ALB 2.2* 2.4*   GLOB 3.6 4.2*   LPSE 596* 865*     Recent Labs     05/08/22 2221   INR 2.1*   PTP 21.1*      Recent Labs     05/09/22 0436   TIBC 218*   PSAT 43   FERR 514*      Lab Results   Component Value Date/Time Folate 11.5 05/09/2022 04:36 AM      No results for input(s): PH, PCO2, PO2 in the last 72 hours. No results for input(s): CPK, CKNDX, TROIQ in the last 72 hours.     No lab exists for component: CPKMB  Lab Results   Component Value Date/Time    Cholesterol, total 76 05/09/2022 04:36 AM    HDL Cholesterol 57 05/09/2022 04:36 AM    LDL, calculated 4.4 05/09/2022 04:36 AM    Triglyceride 73 05/09/2022 04:36 AM    CHOL/HDL Ratio 1.3 05/09/2022 04:36 AM     No results found for: GLUCPOC  Lab Results   Component Value Date/Time    Color DARK YELLOW 05/09/2022 12:49 AM    Appearance CLOUDY (A) 05/09/2022 12:49 AM    Specific gravity 1.017 05/09/2022 12:49 AM    pH (UA) 5.5 05/09/2022 12:49 AM    Protein Negative 05/09/2022 12:49 AM    Glucose Negative 05/09/2022 12:49 AM    Ketone Negative 05/09/2022 12:49 AM    Bilirubin NEGATIVE  05/05/2015 12:46 PM    Urobilinogen 1.0 05/09/2022 12:49 AM    Nitrites Negative 05/09/2022 12:49 AM    Leukocyte Esterase MODERATE (A) 05/09/2022 12:49 AM    Epithelial cells FEW 05/09/2022 12:49 AM    Bacteria 3+ (A) 05/09/2022 12:49 AM    WBC 10-20 05/09/2022 12:49 AM    RBC 0-5 05/09/2022 12:49 AM       Med list reviewed  Current Facility-Administered Medications   Medication Dose Route Frequency    lactated Ringers infusion  75 mL/hr IntraVENous CONTINUOUS    citalopram (CELEXA) tablet 10 mg  10 mg Oral DAILY    lactulose (CHRONULAC) 10 gram/15 mL solution 30 mL  20 g Oral DAILY    pantoprazole (PROTONIX) tablet 40 mg  40 mg Oral DAILY    sodium chloride (NS) flush 5-40 mL  5-40 mL IntraVENous Q8H    sodium chloride (NS) flush 5-40 mL  5-40 mL IntraVENous PRN    polyethylene glycol (MIRALAX) packet 17 g  17 g Oral DAILY PRN    ondansetron (ZOFRAN ODT) tablet 4 mg  4 mg Oral Q8H PRN    Or    ondansetron (ZOFRAN) injection 4 mg  4 mg IntraVENous Q6H PRN    L.acidophilus-paracasei-S.thermophil-bifidobacter (RISAQUAD) 8 billion cell capsule  1 Capsule Oral DAILY    sodium chloride (NS) flush 5-10 mL  5-10 mL IntraVENous PRN    cefepime (MAXIPIME) 2 g in 0.9% sodium chloride (MBP/ADV) 100 mL MBP  2 g IntraVENous Q8H       Care Plan discussed with:  Patient/Family and Nurse    Erika Vuong MD  Internal Medicine  Date of Service: 5/10/2022

## 2022-05-10 NOTE — CONSULTS
Infectious Disease Consult    Today's Date: 5/10/2022   Admit Date: 5/8/2022    Impression:   Bacteremia  UTI  - afebrile, WBC 15->9.2    U/A (5/9) wbc 10-20 with 3+ bacteria, urine cx; GNR    Blood cx (5/9) GNR    CT of abd/pel (5/9) Cirrhosis with mild splenomegaly and small ascites. Cholecystolithiasis. Small  bilateral nonobstructing renal calculi. CXR (5/9) Unchanged chronic interstitial opacities in both lungs  Plan:   ·  continue with IV cefepime, will need at least 2 weeks of ABX therapy. Final discharge ABX order will be placed once reviewed GNR of identification and susceptibility which are currently pending  ·  adjust abx prn  ·  fever work up if temp >= 100.4    Above plan of care discussed and agreed with Dr. Marco Antonio Cronin:   · IV rocephin 5/9  · IV cefepime 5/10-    Subjective:   Date of Consultation:  May 10, 2022  Referring Physician: Dr. Talib Blancas    Patient is a 59 y.o. male with medical hx of alcoholic liver cirrhosis, mitral valve prolapse, pulmonary sarcoidosis was admitted to the hospital on 5/8 AMS. The patient was brought to the emergency room by his sister. Pt's Head CT on admission revealed no acute intracranial process. Pt's urine cx grew and blood cx from 5/9 grew GNR. Pt received one time dose IV rocephin then changed to IV cefepime. The patient has alcoholic liver cirrhosis. The patient last consumed alcohol in 11/2021. The patient is undergoing various evaluation prior to being referred for liver transplant at [facility]. During visit, pt denies any discomfort. No fever, chills, nausea, vomiting, diarrhea, dysuria, sob, roque, chest pain, abd pain, or lower extremity pain. Pt has not been vaccinated for COVID 19. Pt is allergic to PCN which causes hives. ID team was consulted for bacteremia and UTI evaluation and its ABX treatment recommendation.   Patient Active Problem List   Diagnosis Code    Cirrhosis ([facility] C71.22    Alcoholic liver disease ([facility] K70.9    Sarcoidosis, lung ([facility] D86.0    Mitral valve prolapse I34.1    Acute delirium R41.0    Calculus of gallbladder without cholecystitis without obstruction K80.20     Past Medical History:   Diagnosis Date    [facility]     Mitral valve prolapse     Sarcoidosis, lung ([facility]       History reviewed. No pertinent family history. Social History     Tobacco Use    Smoking status: Current Some Day Smoker    Smokeless tobacco: Never Used    Tobacco comment: occassional cigar   Substance Use Topics    Alcohol use: Not Currently     Alcohol/week: 2.0 standard drinks     Types: 2 Cans of beer per week     Comment: pt states no longer      Past Surgical History:   Procedure Laterality Date    HX COLONOSCOPY      HX SEPTOPLASTY      MT APPENDECTOMY        Prior to Admission medications    Medication Sig Start Date End Date Taking? Authorizing Provider   methylPREDNISolone (MEDROL) 32 mg tablet Take 1 tablet by mouth 12 hours and 2 hours before contrast media injection. 4/22/22   Trina Owens MD   lactulose (CHRONULAC) 10 gram/15 mL solution Take 30 mL by mouth daily. 4/22/22   Trina Owens MD   citalopram (CELEXA) 10 mg tablet  4/15/22   Provider, Historical   levocetirizine (Xyzal) 5 mg tablet Take 5 mg by mouth daily as needed for Allergies. Provider, Historical   spironolactone (Aldactone) 100 mg tablet Take 1 Tablet by mouth daily. 4/7/22   Trina Owens MD   furosemide (Lasix) 40 mg tablet Take 1 Tablet by mouth daily. 4/7/22   Trina Owens MD   pantoprazole (Protonix) 20 mg tablet Take 2 Tablets by mouth daily. 4/7/22   Trina Owens MD   cefUROXime (Ceftin) 500 mg tablet Take 500 mg by mouth two (2) times a day.   Patient not taking: Reported on 4/7/2022 9/20/19   Provider, Historical     a  Allergies   Allergen Reactions    Contrast Agent [Iodine] Itching    Penicillin G Hives     30 years ago         REVIEW OF SYSTEMS:     Total of 12 systems reviewed as follows:   I am not able to complete the review of systems because: The patient is intubated and sedated    The patient has altered mental status due to his acute medical problems    The patient has baseline aphasia from prior stroke(s)    The patient has baseline dementia and is not reliable historian                 POSITIVE= underlined text  Negative = text not underlined  General:  fever, chills, sweats, generalized weakness, weight loss/gain,      loss of appetite   Eyes:    blurred vision, eye pain, loss of vision, double vision  ENT:    rhinorrhea, pharyngitis   Respiratory:   cough, sputum production, SOB, wheezing, ESTRADA, pleuritic pain   Cardiology:   chest pain, palpitations, orthopnea, PND, edema, syncope   Gastrointestinal:  abdominal pain , N/V, dysphagia, diarrhea, constipation, bleeding   Genitourinary:  frequency, urgency, dysuria, hematuria, incontinence   Muskuloskeletal :  arthralgia, myalgia   Hematology:  easy bruising, nose or gum bleeding, lymphadenopathy   Dermatological: rash, ulceration, pruritis   Endocrine:   hot flashes or polydipsia   Neurological:  headache, dizziness, confusion, focal weakness, paresthesia,     Speech difficulties, memory loss, gait disturbance  Psychological: Feelings of anxiety, depression, agitation    Objective:     Visit Vitals  /66 (BP 1 Location: Left upper arm, BP Patient Position: At rest;Semi fowlers)   Pulse (!) 104   Temp 98.4 °F (36.9 °C)   Resp 13   Wt 84.2 kg (185 lb 10 oz)   SpO2 96%   BMI 23.20 kg/m²     Temp (24hrs), Av.2 °F (36.8 °C), Min:97.8 °F (36.6 °C), Max:98.6 °F (37 °C)       Lines: peripheral line    PHYSICAL EXAM:   General:    Chronically ill appearing. Alert, cooperative, no distress, appears stated age. HEENT: Atraumatic, Icteric sclerae, pink conjunctivae     No oral ulcers, mucosa moist, throat clear  Neck:  Supple, symmetrical,  thyroid: non tender  Lungs:   Clear to auscultation bilaterally. No Wheezing or Rhonchi.  No rales.  Chest wall:  No tenderness  No Accessory muscle use. Heart:   Regular  rhythm,  No  murmur   No edema  Abdomen:   Soft, non-tender. Distended. Bowel sounds normal  Extremities: No cyanosis. No clubbing  Skin:     Not pale. Jaundiced  No rashes   Psych:  Good insight. Not depressed. Not anxious or agitated. Neurologic: EOMs intact. No facial asymmetry. No aphasia or slurred speech. Symmetrical strength, Alert and oriented X 4. Data Review:     CBC:  Recent Labs     05/10/22  0324 05/09/22  0436 05/08/22  2221 05/08/22  2221   WBC 9.5 12.3*  --  15.0*   GRANS 86* 89*   < > 88*   MONOS 8 5   < > 7   EOS 1 1   < > 0   ANEU 8.1* 10.9*   < > 13.1*   ABL 0.4* 0.5*   < > 0.5*   HGB 7.7* 8.5*  --  9.5*   HCT 23.8* 25.5*  --  28.9*   PLT 36* 39*  --  54*    < > = values in this interval not displayed.        BMP:  Recent Labs     05/10/22  0324 05/09/22  0436 05/08/22  2221   CREA 0.83 0.93 1.27   BUN 26* 30* 34*   * 134* 131*   K 4.1 3.8 4.2    104 98   CO2 18* 22 24   AGAP 9 8 9   * 129* 201*       LFTS:  Recent Labs     05/09/22 0436 05/08/22 2221   TBILI 18.0* 20.3*   ALT 78 93*   * 325*   TP 5.8* 6.6   ALB 2.2* 2.4*       Microbiology:     All Micro Results     Procedure Component Value Units Date/Time    CULTURE, BLOOD, PAIRED [[id number]]     Order Status: Sent Specimen: Blood     CULTURE, URINE [[id number]]  (Abnormal) Collected: 05/09/22 0116    Order Status: Completed Specimen: Urine from Clean catch Updated: 05/10/22 0712     Special Requests: NO SPECIAL REQUESTS        Tuttle Count --        >100,000  COLONIES/mL       Culture result: GRAM NEGATIVE RODS       CULTURE, BLOOD, PAIRED [[id number]]  (Abnormal) Collected: 05/09/22 0049    Order Status: Completed Specimen: Blood Updated: 05/10/22 0557     Special Requests: NO SPECIAL REQUESTS        Culture result:       GRAM NEGATIVE RODS GROWING IN 1 OF 2 BOTTLES DRAWN , SITE = RH                  REMAINING BOTTLE(S) HAS/HAVE NO GROWTH SO FAR            (NOTE) PRELIMINARY REPORT OF GRAM NEGATIVE RODS GROWING IN 1 OF 2 BOTTLES, CALLED TO AND READ BACK BY MICHAEL MCCORMACK 5/10/22 0557 [facility].     CULTURE, MRSA [[id number]]     Order Status: Sent Specimen: Nasal             Signed By: Sobeida Finch NP     May 10, 2022

## 2022-05-10 NOTE — PROGRESS NOTES
Bedside and Verbal shift change report given to [facility] (oncoming nurse) by Jerry Harris (offgoing nurse).  Report included the following information SBAR, Kardex, Intake/Output, MAR and Cardiac Rhythm SR.

## 2022-05-11 NOTE — PROCEDURES
[facility]  PULMONARY FUNCTION TEST    Name:  Cole Holder  MR#:  [id number]  :  1965  ACCOUNT #:  [de-identified]  DATE OF SERVICE:  2022      CLINICAL INDICATION:  Cirrhosis. Spirometry is performed. Spirometry reveals mild airflow obstruction. The expiratory limb of the flow volume loop is consistent with an obstructive pattern. There is flattening of the inspiratory limb of the flow volume loop, which may be seen in variable extrathoracic airflow obstruction. Clinical correlation is recommended.       MD ES Schwartz/[email or web address]/  D:  2022 14:14  T:  2022 18:32  JOB #:  [id number]

## 2022-05-11 NOTE — PROGRESS NOTES
Problem: Self Care Deficits Care Plan (Adult)  Goal: *Acute Goals and Plan of Care (Insert Text)  Description:   FUNCTIONAL STATUS PRIOR TO ADMISSION: Patient was staying with sister and brother in law. Was independent in basic ADL, requires assist for IADL. Was going to outpatient PT to assist with strengthening prior to liver transplant     HOME SUPPORT: Lived with sister and brother in law in ranch style home. Occupational Therapy Goals  Initiated 5/11/2022  1. Patient will perform grooming with modified independence within 7 day(s). 2.  Patient will perform upper body dressing with modified independence within 7 day(s). 3.  Patient will perform lower body dressing with modified independence within 7 day(s). 4.  Patient will perform all aspects of toileting with modified independence within 7 day(s). 5.  Patient will utilize energy conservation techniques during functional activities with verbal cues within 7 day(s). Outcome: Not Met   OCCUPATIONAL THERAPY EVALUATION  Patient: Marin Bear (2 y.o. male)  Date: 5/11/2022  Primary Diagnosis: Acute delirium [R41.0]        Precautions: Falls       ASSESSMENT  Based on the objective data described below, the patient presents with minimally impaired balance and strength impacting ability to complete ADL/IADL at baseline. Patient received reclined in bed, amenable to session. Patient able to complete all transfers and functional mobility with overall CGA, Min A for ADL. Educated on benefits of OOB ADL and having meals OOB. Patient remains below functional baseline, will likely benefit from resuming outpatient therapy at discharge. Patient left reclined in bed, all needs in reach, NAD. Current Level of Function Impacting Discharge (ADLs/self-care): up to supervision/CGA ADL and mobility    Functional Outcome Measure:   The patient scored Total: 65/100 on the Barthel Index outcome measure which is indicative of 35% impaired ability to care for basic self needs/dependency on others; inferred 50% dependency on others for instrumental ADLs. Other factors to consider for discharge: below baseline, AMS cleared     Patient will benefit from skilled therapy intervention to address the above noted impairments. PLAN :  Recommendations and Planned Interventions: self care training, functional mobility training, therapeutic exercise, therapeutic activities, endurance activities, patient education, home safety training, and family training/education    Frequency/Duration: Patient will be followed by occupational therapy 3 times a week to address goals. Recommendation for discharge: (in order for the patient to meet his/her long term goals)  Return to outpatient PT once medically cleared, No OT needs identified    This discharge recommendation:  Has been made in collaboration with the attending provider and/or case management    IF patient discharges home will need the following DME: patient owns DME required for discharge       SUBJECTIVE:   Patient stated I was getting stronger with PT before I came here.     OBJECTIVE DATA SUMMARY:   HISTORY:   Past Medical History:   Diagnosis Date    [facility] ([facility]     Mitral valve prolapse     Sarcoidosis, lung ([facility])      Past Surgical History:   Procedure Laterality Date    HX COLONOSCOPY      HX SEPTOPLASTY      VA APPENDECTOMY         Expanded or extensive additional review of patient history:     Home Situation  Home Environment: Private residence  # Steps to Enter: 3  Rails to Enter: Yes  Hand Rails : Bilateral  One/Two Story Residence: One story  Living Alone: No  Support Systems: Other Family Member(s)  Patient Expects to be Discharged to[de-identified] Home  Current DME Used/Available at Home: Walker, rolling  Tub or Shower Type: Tub/Shower combination    Hand dominance: Right    EXAMINATION OF PERFORMANCE DEFICITS:  Cognitive/Behavioral Status:  Neurologic State: Alert  Orientation Level: Oriented X4  Cognition: Appropriate decision making; Follows commands  Perception: Appears intact  Perseveration: No perseveration noted  Safety/Judgement: Good awareness of safety precautions    Skin: bruising throughout BUE/BLE    Edema: none noted    Hearing: Auditory  Auditory Impairment: None    Vision/Perceptual:                           Acuity: Within Defined Limits;Able to read clock/calendar on wall without difficulty; Able to read employee name badge without difficulty         Range of Motion:  AROM: Within functional limits  PROM: Within functional limits                      Strength:  Strength: Within functional limits                Coordination:  Coordination: Within functional limits  Fine Motor Skills-Upper: Left Intact; Right Intact    Gross Motor Skills-Upper: Left Intact; Right Intact    Tone & Sensation:  Tone: Normal  Sensation: Intact                      Balance:  Sitting: Intact  Standing: Intact    Functional Mobility and Transfers for ADLs:  Bed Mobility:  Rolling: Independent  Supine to Sit: Independent  Scooting: Independent    Transfers:  Sit to Stand: Supervision  Stand to Sit: Supervision  Bathroom Mobility: Supervision/set up  Toilet Transfer : Contact guard assistance    ADL Assessment:  Feeding: Independent    Oral Facial Hygiene/Grooming: Supervision    Bathing: Supervision    Upper Body Dressing: Supervision    Lower Body Dressing: Supervision    Toileting: Supervision                ADL Intervention and task modifications:       Grooming  Grooming Assistance: Supervision  Position Performed: Standing  Washing Face: Supervision              Upper Body [address]: Supervision    Lower Body Dressing Assistance  Socks: Contact guard assistance  Leg Crossed Method Used: Yes  Position Performed: Seated edge of bed         Cognitive Retraining  Safety/Judgement: Good awareness of safety precautions    Functional Measure:  Barthel Index:    Bathin  Bladder: 10  Bowels: 10  Grooming: 5  Dressing: 10  Feeding: 10  Mobility: 0  Stairs: 0  Toilet Use: 5  Transfer (Bed to Chair and Back): 10  Total: 65/100        The Barthel ADL Index: Guidelines  1. The index should be used as a record of what a patient does, not as a record of what a patient could do. 2. The main aim is to establish degree of independence from any help, physical or verbal, however minor and for whatever reason. 3. The need for supervision renders the patient not independent. 4. A patient's performance should be established using the best available evidence. Asking the patient, friends/relatives and nurses are the usual sources, but direct observation and common sense are also important. However direct testing is not needed. 5. Usually the patient's performance over the preceding 24-48 hours is important, but occasionally longer periods will be relevant. 6. Middle categories imply that the patient supplies over 50 per cent of the effort. 7. Use of aids to be independent is allowed. Magnus Restrepo., Barthel, D.W. (5753). Functional evaluation: the Barthel Index. [facility] (14)2. SARA Calderon, Jostin Burns., Edwardo Gaytan., [place], [facility] (1999). Measuring the change indisability after inpatient rehabilitation; comparison of the responsiveness of the Barthel Index and Functional [place] Measure. Journal of Neurology, Neurosurgery, and Psychiatry, 66(4), 590-585. Moe Hui, N.J.A, JOSE G Connor, & Judith Logan M.A. (2004.) Assessment of post-stroke quality of life in cost-effectiveness studies: The usefulness of the Barthel Index and the EuroQoL-5D.  Quality of Life Research, 15, 920-29         Occupational Therapy Evaluation Charge Determination   History Examination Decision-Making   MEDIUM Complexity : Expanded review of history including physical, cognitive and psychosocial  history  LOW Complexity : 1-3 performance deficits relating to physical, cognitive , or psychosocial skils that result in activity limitations and / or participation restrictions  LOW Complexity : No comorbidities that affect functional and no verbal or physical assistance needed to complete eval tasks       Based on the above components, the patient evaluation is determined to be of the following complexity level: LOW   Pain Rating:  None reported    Activity Tolerance:   Fair and requires rest breaks    After treatment patient left in no apparent distress:    Supine in bed, Call bell within reach, Caregiver / family present, and Side rails x 3    COMMUNICATION/EDUCATION:   The patients plan of care was discussed with: Registered nurse. Home safety education was provided and the patient/caregiver indicated understanding., Patient/family have participated as able in goal setting and plan of care. , and Patient/family agree to work toward stated goals and plan of care. This patients plan of care is appropriate for delegation to [facility].     Thank you for this referral.  Tima Victoria, OT

## 2022-05-11 NOTE — PROGRESS NOTES
Hospitalist Progress Note      Hospital summary: This is a 57-year-old man with a past medical history significant for alcoholic liver cirrhosis, mitral valve prolapse, pulmonary sarcoidosis, who was in his usual state of health until the date of presentation at the emergency room when the patient developed confusion. The patient was brought to the emergency room by his spouse. When the patient arrived at the emergency room, CT scan of the head was obtained, this was negative. The patient's urinalysis was consistent with urinary tract infection. His ammonia level is within normal limits. The patient has been taking his medications as prescribed. The patient's spouse called his hepatologist who advised them to come to the emergency room. There was no fever, rigors, or chills reported. The patient denies any nausea or vomiting, also no abdominal pain. No record of prior admission to this hospital.  The patient has alcoholic liver cirrhosis. The patient last consumed alcohol in 11/2021. The patient is undergoing various evaluation prior to being referred for liver transplant at [facility]. The patient is not able to provide good history because of his confusion. 5/8/2022      Assessment/Plan:    Sepsis on poa   Klebsiella bacteremia / UTI   -  presented with change in mental status, leukocytosis, tachycardia, elevated lactic acid level  - blood : 1 of 2 growing lactose fermenting GNRs  - urine cx : Klebsiella   - rpt blood cx 5/10: NGTD   - appreciate ID input  - echo ordered   - c/w IV cefepime  - need final plan from ID regarding IV abx for discharge      Acute metabolic encephalopathy - resolved   Likely due to sepis  - normal ammonia level, salicylate level, K03 and folic acid level. - UDS- negative. TSH normal   - supportive care     Alcoholic liver cirrhosis without ascites.     Elevated LFTs  - on lactulose  - appreciate Hepatology input   - pt on transplant list     Elevated Lipase   - no abd pain. - CT abd: Cirrhosis with mild splenomegaly and small ascites. Cholecystolithiasis. Small bilateral nonobstructing renal calculi. - appreciate gen surgery input  - tolerating diet    Coagulopathy/ Thrombocytopenia  - due to liver cirrhosis. Will monitor     Anemia -likely due to chronic disease.    - monitor hb, transfuse <7  - hb low stable      Tobacco abuse. - counseled to quit. patient does not want Nicoderm patch at this time. Code status: Full  DVT prophylaxis: SCDs  Disposition: TBD. outpt PT. Home likely tomorrow with IV abx   ----------------------------------------------    CC: AMS     S: Patient is seen and examined at bedside this AM. He feels ok, alert and oriented. Still waiting for blood cx report. Likely dc tomorrow   Discussed with nursing     Spoke with sister Karyn Alexandre on phone and updated patient's status- Klebsiella bacteremia , will need IV abx, possible dc tomorrow     Review of Systems:  A comprehensive review of systems was negative.     O:  Visit Vitals  BP (!) 158/71 (BP 1 Location: Left upper arm, BP Patient Position: Sitting)   Pulse (!) 102   Temp 98.2 °F (36.8 °C)   Resp 13   Ht 6' 3\" (1.905 m)   Wt 81.8 kg (180 lb 5.4 oz)   SpO2 99%   BMI 22.54 kg/m²       PHYSICAL EXAM:  Gen: NAD  HEENT: icterus+, oropharynx clear, MM moist  Neck: supple, trachea midline, no adenopathy  Heart: RRR, no MRG, no JVD, no peripheral edema  Lungs: CTA b/l, non-labored respirations  Abd: soft, NT, ND, BS+, no organomegaly  Extr: warm  Skin: dry, no rash  Neuro: CN II-XII grossly intact, normal speech, moves all extremities  Psych: normal mood, appropriate affect      Intake/Output Summary (Last 24 hours) at 5/11/2022 1504  Last data filed at 5/11/2022 1319  Gross per 24 hour   Intake 4447.5 ml   Output 1650 ml   Net 2797.5 ml        Recent labs & imaging reviewed:  Recent Results (from the past 24 hour(s))   METABOLIC PANEL, BASIC    Collection Time: 05/11/22  2:05 AM   Result Value Ref Range    Sodium 133 (L) 136 - 145 mmol/L    Potassium 5.0 3.5 - 5.1 mmol/L    Chloride 106 97 - 108 mmol/L    CO2 20 (L) 21 - 32 mmol/L    Anion gap 7 5 - 15 mmol/L    Glucose 117 (H) 65 - 100 mg/dL    BUN 25 (H) 6 - 20 MG/DL    Creatinine 1.08 0.70 - 1.30 MG/DL    BUN/Creatinine ratio 23 (H) 12 - 20      GFR est AA >60 >60 ml/min/1.73m2    GFR est non-AA >60 >60 ml/min/1.73m2    Calcium 7.7 (L) 8.5 - 10.1 MG/DL   CBC WITH AUTOMATED DIFF    Collection Time: 05/11/22  2:05 AM   Result Value Ref Range    WBC 9.8 4.1 - 11.1 K/uL    RBC 1.89 (L) 4.10 - 5.70 M/uL    HGB 7.8 (L) 12.1 - 17.0 g/dL    HCT 22.7 (L) 36.6 - 50.3 %    .1 (H) 80.0 - 99.0 FL    MCH 41.3 (H) 26.0 - 34.0 PG    MCHC 34.4 30.0 - 36.5 g/dL    RDW 15.6 (H) 11.5 - 14.5 %    PLATELET 79 (L) 799 - 400 K/uL    MPV 12.1 8.9 - 12.9 FL    NRBC 0.2 (H) 0  WBC    ABSOLUTE NRBC 0.02 (H) 0.00 - 0.01 K/uL    NEUTROPHILS 80 (H) 32 - 75 %    LYMPHOCYTES 6 (L) 12 - 49 %    MONOCYTES 10 5 - 13 %    EOSINOPHILS 2 0 - 7 %    BASOPHILS 0 0 - 1 %    IMMATURE GRANULOCYTES 2 (H) 0.0 - 0.5 %    ABS. NEUTROPHILS 7.8 1.8 - 8.0 K/UL    ABS. LYMPHOCYTES 0.6 (L) 0.8 - 3.5 K/UL    ABS. MONOCYTES 1.0 0.0 - 1.0 K/UL    ABS. EOSINOPHILS 0.2 0.0 - 0.4 K/UL    ABS. BASOPHILS 0.0 0.0 - 0.1 K/UL    ABS. IMM.  GRANS. 0.2 (H) 0.00 - 0.04 K/UL    DF SMEAR SCANNED      RBC COMMENTS ANISOCYTOSIS  1+        RBC COMMENTS MACROCYTOSIS  1+        RBC COMMENTS JOSESITO CELLS  1+        RBC COMMENTS ATYPICAL LYMPHOCYTES PRESENT       Recent Labs     05/11/22  0205 05/10/22  0324   WBC 9.8 9.5   HGB 7.8* 7.7*   HCT 22.7* 23.8*   PLT 79* 36*     Recent Labs     05/11/22  0205 05/10/22  0324 05/09/22  0436   * 134* 134*   K 5.0 4.1 3.8    107 104   CO2 20* 18* 22   BUN 25* 26* 30*   CREA 1.08 0.83 0.93   * 114* 129*   CA 7.7* 7.6* 7.6*   MG  --   --  1.8   PHOS  --   --  2.3*     Recent Labs     05/09/22 0436 05/08/22  2221   ALT 78 93*   * 325*   TBILI 18.0* 20.3*   TP 5.8* 6.6   ALB 2.2* 2.4*   GLOB 3.6 4.2*   LPSE 596* 865*     Recent Labs     05/08/22  2221   INR 2.1*   PTP 21.1*      Recent Labs     05/09/22  0436   TIBC 218*   PSAT 43   FERR 514*      Lab Results   Component Value Date/Time    Folate 11.5 05/09/2022 04:36 AM      No results for input(s): PH, PCO2, PO2 in the last 72 hours. No results for input(s): CPK, CKNDX, TROIQ in the last 72 hours.     No lab exists for component: CPKMB  Lab Results   Component Value Date/Time    Cholesterol, total 76 05/09/2022 04:36 AM    HDL Cholesterol 57 05/09/2022 04:36 AM    LDL, calculated 4.4 05/09/2022 04:36 AM    Triglyceride 73 05/09/2022 04:36 AM    CHOL/HDL Ratio 1.3 05/09/2022 04:36 AM     No results found for: GLUCPOC  Lab Results   Component Value Date/Time    Color DARK YELLOW 05/09/2022 12:49 AM    Appearance CLOUDY (A) 05/09/2022 12:49 AM    Specific gravity 1.017 05/09/2022 12:49 AM    pH (UA) 5.5 05/09/2022 12:49 AM    Protein Negative 05/09/2022 12:49 AM    Glucose Negative 05/09/2022 12:49 AM    Ketone Negative 05/09/2022 12:49 AM    Bilirubin NEGATIVE  05/05/2015 12:46 PM    Urobilinogen 1.0 05/09/2022 12:49 AM    Nitrites Negative 05/09/2022 12:49 AM    Leukocyte Esterase MODERATE (A) 05/09/2022 12:49 AM    Epithelial cells FEW 05/09/2022 12:49 AM    Bacteria 3+ (A) 05/09/2022 12:49 AM    WBC 10-20 05/09/2022 12:49 AM    RBC 0-5 05/09/2022 12:49 AM       Med list reviewed  Current Facility-Administered Medications   Medication Dose Route Frequency    lactated Ringers infusion  75 mL/hr IntraVENous CONTINUOUS    citalopram (CELEXA) tablet 10 mg  10 mg Oral DAILY    lactulose (CHRONULAC) 10 gram/15 mL solution 30 mL  20 g Oral DAILY    pantoprazole (PROTONIX) tablet 40 mg  40 mg Oral DAILY    sodium chloride (NS) flush 5-40 mL  5-40 mL IntraVENous Q8H    sodium chloride (NS) flush 5-40 mL  5-40 mL IntraVENous PRN    polyethylene glycol (MIRALAX) packet 17 g  17 g Oral DAILY PRN    ondansetron (ZOFRAN ODT) tablet 4 mg  4 mg Oral Q8H PRN    Or    ondansetron (ZOFRAN) injection 4 mg  4 mg IntraVENous Q6H PRN    L.acidophilus-paracasei-S.thermophil-bifidobacter (RISAQUAD) 8 billion cell capsule  1 Capsule Oral DAILY    sodium chloride (NS) flush 5-10 mL  5-10 mL IntraVENous PRN    cefepime (MAXIPIME) 2 g in 0.9% sodium chloride (MBP/ADV) 100 mL MBP  2 g IntraVENous Q8H       Care Plan discussed with:  Patient/Family and Nurse    Maryjo Wiley MD  Internal Medicine  Date of Service: 5/11/2022

## 2022-05-11 NOTE — PROGRESS NOTES
ID Progress Note  2022    Subjective:     Sitting up in chair  No complaints  Review of Systems:            Symptom Y/N Comments   Symptom Y/N Comments   Fever/Chills n      Chest Pain n       Poor Appetite       Edema        Cough       Abdominal Pain n       Sputum       Joint Pain        SOB/ESTRADA n      Pruritis/Rash        Nausea/vomit n      Tolerating PT/OT        Diarrhea n      Tolerating Diet        Constipation  n     Other           Could NOT obtain due to:       Objective:     Vitals:   Visit Vitals  BP (!) 158/71 (BP 1 Location: Left upper arm, BP Patient Position: Sitting)   Pulse (!) 102   Temp 98.2 °F (36.8 °C)   Resp 13   Ht 6' 3\" (1.905 m)   Wt 81.8 kg (180 lb 5.4 oz)   SpO2 99%   BMI 22.54 kg/m²        Tmax:  Temp (24hrs), Av.5 °F (36.9 °C), Min:98.1 °F (36.7 °C), Max:99.1 °F (37.3 °C)      PHYSICAL EXAM:  General: chronically ill appearing. WD, WN. Alert, cooperative, no acute distress    EENT:  EOMI. Icteric sclerae. MMM  Resp:  Clear in apex with decreased breath sounds at bases, no wheezing or rales. No accessory muscle use  CV:  Regular  rhythm,  No edema  GI:  Soft, Distended, Non tender. +Bowel sounds  Neurologic:  Alert and oriented X 3, normal speech,   Psych:   Good insight. Not anxious nor agitated  Skin:  No rashes.   No jaundice    Labs:   Lab Results   Component Value Date/Time    WBC 9.8 2022 02:05 AM    HGB 7.8 (L) 2022 02:05 AM    HCT 22.7 (L) 2022 02:05 AM    PLATELET 79 (L) 09/15/7034 02:05 AM    .1 (H) 2022 02:05 AM     Lab Results   Component Value Date/Time    Sodium 133 (L) 2022 02:05 AM    Potassium 5.0 2022 02:05 AM    Chloride 106 2022 02:05 AM    CO2 20 (L) 2022 02:05 AM    Anion gap 7 2022 02:05 AM    Glucose 117 (H) 2022 02:05 AM    BUN 25 (H) 2022 02:05 AM    Creatinine 1.08 2022 02:05 AM    BUN/Creatinine ratio 23 (H) 2022 02:05 AM    GFR est AA >60 2022 02:05 AM GFR est non-AA >60 05/11/2022 02:05 AM    Calcium 7.7 (L) 05/11/2022 02:05 AM    Bilirubin, total 18.0 (H) 05/09/2022 04:36 AM    Alk.  phosphatase 288 (H) 05/09/2022 04:36 AM    Protein, total 5.8 (L) 05/09/2022 04:36 AM    Albumin 2.2 (L) 05/09/2022 04:36 AM    Globulin 3.6 05/09/2022 04:36 AM    A-G Ratio 0.6 (L) 05/09/2022 04:36 AM    ALT (SGPT) 78 05/09/2022 04:36 AM         Cultures:   Results     Procedure Component Value Units Date/Time    CULTURE, BLOOD, PAIRED [[id number]] Collected: 05/10/22 0936    Order Status: Completed Specimen: Blood Updated: 05/11/22 0719     Special Requests: NO SPECIAL REQUESTS        Culture result: NO GROWTH AFTER 20 HOURS       CULTURE, MRSA [[id number]]     Order Status: Sent Specimen: Nasal     CULTURE, URINE [[id number]]  (Abnormal)  (Susceptibility) Collected: 05/09/22 0116    Order Status: Completed Specimen: Urine from Clean catch Updated: 05/11/22 1419     Special Requests: NO SPECIAL REQUESTS        [place] Count --        >100,000  COLONIES/mL       Culture result: KLEBSIELLA PNEUMONIAE       Susceptibility      Klebsiella pneumoniae     ZACH     Amikacin ($) Susceptible     Ampicillin ($) Resistant     Ampicillin/sulbactam ($) Susceptible     Cefazolin ($) Susceptible     Cefepime ($$) Susceptible     Cefoxitin Susceptible     Ceftazidime ($) Susceptible     Ceftriaxone ($) Susceptible     Ciprofloxacin ($) Susceptible     Gentamicin ($) Susceptible     Levofloxacin ($) Susceptible     Meropenem ($$) Susceptible     Nitrofurantoin Intermediate     Piperacillin/Tazobac ($) Susceptible     Tobramycin ($) Susceptible     Trimeth/Sulfa Susceptible                  Linear View                   CULTURE, BLOOD, PAIRED [[id number]]  (Abnormal) Collected: 05/09/22 0049    Order Status: Completed Specimen: Blood Updated: 05/11/22 1029     Special Requests: NO SPECIAL REQUESTS        Culture result:       LACTOSE FERMENTING GRAM NEGATIVE RODS GROWING IN 1 OF 2 BOTTLES DRAWN , SITE = RH                  REMAINING BOTTLE(S) HAS/HAVE NO GROWTH SO FAR            (NOTE) PRELIMINARY REPORT OF GRAM NEGATIVE RODS GROWING IN 1 OF 2 BOTTLES, CALLED TO AND READ BACK BY MICHAEL MCCORMACK 5/10/22 [address] [place] [address]. Impression:   Bacteremia  UTI  Hx of QTC prolongation  - afebrile, WBC 9.8    U/A (5/9) wbc 10-20 with 3+ bacteria, urine cx; klebsiella pneumoniae    Blood cx (5/9) lactose fermenting GNR      CT of abd/pel (5/9) Cirrhosis with mild splenomegaly and small ascites. Cholecystolithiasis. Small  bilateral nonobstructing renal calculi. CXR (5/9) Unchanged chronic interstitial opacities in both lungs    Plan:   ·  continue with IV cefepime, will need at least 2 weeks of ABX therapy. Final discharge ABX order will be placed once reviewed final blood cx result   · Check QTC today;  ms on 5/8. May need PICC line placement; will order it tomorrow if he needs.   ·  adjust abx prn  ·  fever work up if temp >= 100.4      Above plan of care discussed and agreed with Dr. Félix Ennis, NP

## 2022-05-11 NOTE — PROGRESS NOTES
Problem: Mobility Impaired (Adult and Pediatric)  Goal: *Acute Goals and Plan of Care (Insert Text)  Description: FUNCTIONAL STATUS PRIOR TO ADMISSION: The patient ambulated with no AD. He was going to outpatient PT.     HOME SUPPORT PRIOR TO ADMISSION: The patient lives temporarily with his sister who assists with IADL's. Physical Therapy Goals  Initiated 5/11/2022  1. Patient will move from supine to sit and sit to supine , scoot up and down, and roll side to side in bed with independence within 7 day(s). 2.  Patient will transfer from bed to chair and chair to bed with independence using the least restrictive device within 7 day(s). 3.  Patient will perform sit to stand with independence within 7 day(s). 4.  Patient will ambulate with independence for 150 feet with the least restrictive device within 7 day(s). 5.  Patient will ascend/descend 4 stairs with 1 handrail(s) with supervision/set-up within 7 day(s). Outcome: Not Met  PHYSICAL THERAPY EVALUATION  Patient: Janna Fox (42 y.o. male)  Date: 5/11/2022  Primary Diagnosis: Acute delirium [R41.0]        Precautions:          ASSESSMENT  Based on the objective data described below, the patient presents with generalized debility due to a UTI. The patient is cooperative and motivated to be up and out of the bed although he is feeling a bit weak. He is able to come to sitting independently. He stood and ambulated 3 feet to the bedside chair and was able to be up in standing for 2 minutes before sitting down. He is encouraged to do his LE seated ex while up. No home needs are anticipated-he wants to return to outpatient therapy. .    Current Level of Function Impacting Discharge (mobility/balance): None. Functional Outcome Measure: The patient scored Total: 65/100 on the Barthel Index outcome measure which is indicative of being 35% in basic self-care.      Other factors to consider for discharge: None     Patient will benefit from skilled therapy intervention to address the above noted impairments. PLAN :  Recommendations and Planned Interventions: gait training      Frequency/Duration: Patient will be followed by physical therapy:  5 times a week to address goals. Recommendation for discharge: (in order for the patient to meet his/her long term goals)  Outpatient physical therapy follow up recommended for Patient was going to outpatient and would like to return. This discharge recommendation:  Has been made in collaboration with the attending provider and/or case management    IF patient discharges home will need the following DME: none         SUBJECTIVE:   Patient stated Kristin Weir should have gotten up yesterday but I felt lazy.     OBJECTIVE DATA SUMMARY:   HISTORY:    Past Medical History:   Diagnosis Date    Cirrhosis ([address]     Mitral valve prolapse     Sarcoidosis, lung (HCC)      Past Surgical History:   Procedure Laterality Date    HX COLONOSCOPY      HX SEPTOPLASTY      [place] APPENDECTOMY         Personal factors and/or comorbidities impacting plan of care: None    Home Situation  Home Environment: Private residence  # Steps to Enter: 3  Rails to Enter: Yes  Hand Rails : Bilateral  One/Two Story Residence: One story  Living Alone: No  Support Systems: Other Family Member(s)  Patient Expects to be Discharged to[de-identified] Home  Current DME Used/Available at Home: Walker, rolling  Tub or Shower Type: Tub/Shower combination    EXAMINATION/PRESENTATION/DECISION MAKING:   Critical Behavior:  Neurologic State: Alert  Orientation Level: Oriented X4  Cognition: Appropriate decision making,Follows commands  Safety/Judgement: Good awareness of safety precautions  Hearing:     Skin:  per nursing. Edema: per nursing. Range Of Motion:  AROM: Within functional limits           PROM: Within functional limits           Strength:    Strength:  Within functional limits                    Tone & Sensation:   Tone: Normal              Sensation: Intact               Coordination:  Coordination: Within functional limits  Vision:      Functional Mobility:  Bed Mobility:  Rolling: Independent  Supine to Sit: Independent     Scooting: Independent  Transfers:  Sit to Stand: Supervision  Stand to Sit: Supervision                       Balance:   Sitting: Intact  Standing: Intact  Ambulation/Gait Training:  Distance (ft): 3 Feet (ft)     Ambulation - Level of Assistance: Contact guard assistance                 Base of Support: Narrowed                           Steady when up in standing. Therapeutic Exercises:   Encouraged to do ankle pumps, LAQ, and marching while up. Functional Measure:  Barthel Index:    Bathin  Bladder: 10  Bowels: 10  Groomin  Dressing: 10  Feeding: 10  Mobility: 0  Stairs: 0  Toilet Use: 5  Transfer (Bed to Chair and Back): 10  Total: 65/100       The Barthel ADL Index: Guidelines  1. The index should be used as a record of what a patient does, not as a record of what a patient could do. 2. The main aim is to establish degree of independence from any help, physical or verbal, however minor and for whatever reason. 3. The need for supervision renders the patient not independent. 4. A patient's performance should be established using the best available evidence. Asking the patient, friends/relatives and nurses are the usual sources, but direct observation and common sense are also important. However direct testing is not needed. 5. Usually the patient's performance over the preceding 24-48 hours is important, but occasionally longer periods will be relevant. 6. Middle categories imply that the patient supplies over 50 per cent of the effort. 7. Use of aids to be independent is allowed. Score Interpretation (from [address] [place] [address])    Independent   60-79 Minimally independent   40-59 Partially dependent   20-39 Very dependent   <20 Totally dependent     -Pretty Boyd., Barthel, D.W. (1965).  Functional evaluation: the Barthel Index. [address] [facility] ([address] (1997). The Barthel activities of daily living index: self-reporting versus actual performance in the old (> or = 75 years). Journal of [facility]4 [facility] 45(7), [facility], SARA, Oumou Alexandre., Roxanna Contreras. (1999). Measuring the change in disability after inpatient rehabilitation; comparison of the responsiveness of the Barthel Index and Functional [place] Measure. Journal of Neurology, Neurosurgery, and Psychiatry, 66(4), 436-413. KJ Silverman, JOSE G Connor, & Yana Henderson MReddA. (2004) Assessment of post-stroke quality of life in cost-effectiveness studies: The usefulness of the Barthel Index and the EuroQoL-5D. Quality of Life Research, 15, 241-00        Physical Therapy Evaluation Charge Determination   History Examination Presentation Decision-Making   LOW Complexity : Zero comorbidities / personal factors that will impact the outcome / POC LOW Complexity : 1-2 Standardized tests and measures addressing body structure, function, activity limitation and / or participation in recreation  LOW Complexity : Stable, uncomplicated  LOW Complexity : FOTO score of       Based on the above components, the patient evaluation is determined to be of the following complexity level: LOW         Activity Tolerance:   Good    After treatment patient left in no apparent distress:   Sitting in chair and Call bell within reach    COMMUNICATION/EDUCATION:   The patients plan of care was discussed with: Registered nurse. Fall prevention education was provided and the patient/caregiver indicated understanding., Patient/family have participated as able in goal setting and plan of care. , and Patient/family agree to work toward stated goals and plan of care.     Thank you for this referral.  Royal Jones, PT   Time Calculation: 18 mins

## 2022-05-11 NOTE — PROGRESS NOTES
Orders received, chart reviewed and patient evaluated by occupational therapy. Pending progression with skilled acute occupational therapy, recommend:  Resume outpatient therapy once medically cleared    Recommend with nursing ADLs with supervision/setup, OOB to chair 3x/day and toileting via functional mobility to and from bathroom x1 assist. Thank you for completing as able in order to maintain patient strength, endurance and independence. Full evaluation to follow.      Monika Edouard MS, OTR/L

## 2022-05-12 NOTE — PROGRESS NOTES
ID Progress Note  2022    Subjective:     Sitting up in chair  No complaints  Review of Systems:            Symptom Y/N Comments   Symptom Y/N Comments   Fever/Chills n      Chest Pain n       Poor Appetite       Edema        Cough       Abdominal Pain n       Sputum       Joint Pain        SOB/ESTRADA n      Pruritis/Rash        Nausea/vomit n      Tolerating PT/OT        Diarrhea n      Tolerating Diet        Constipation  n     Other           Could NOT obtain due to:       Objective:     Vitals:   Visit Vitals  BP (!) 155/71   Pulse (!) 105   Temp 98.5 °F (36.9 °C)   Resp 15   Ht 6' 3\" (1.905 m)   Wt 81.6 kg (180 lb)   SpO2 100%   BMI 22.50 kg/m²        Tmax:  Temp (24hrs), Av.7 °F (37.1 °C), Min:98.5 °F (36.9 °C), Max:99 °F (37.2 °C)      PHYSICAL EXAM:  General: chronically ill appearing. WD, WN. Alert, cooperative, no acute distress    EENT:  EOMI. Icteric sclerae. MMM  Resp:  Clear in apex with decreased breath sounds at bases, no wheezing or rales. No accessory muscle use  CV:  Regular  rhythm,  No edema  GI:  Soft, Distended, Non tender. +Bowel sounds  Neurologic:  Alert and oriented X 3, normal speech,   Psych:   Good insight. Not anxious nor agitated  Skin:  No rashes.   No jaundice    Labs:   Lab Results   Component Value Date/Time    WBC 11.0 2022 02:52 AM    HGB 7.6 (L) 2022 02:52 AM    HCT 22.9 (L) 2022 02:52 AM    PLATELET 50 (L)  02:52 AM    .5 (H) 2022 02:52 AM     Lab Results   Component Value Date/Time    Sodium 132 (L) 2022 02:52 AM    Potassium 4.3 2022 02:52 AM    Chloride 106 2022 02:52 AM    CO2 18 (L) 2022 02:52 AM    Anion gap 8 2022 02:52 AM    Glucose 127 (H) 2022 02:52 AM    BUN 21 (H) 2022 02:52 AM    Creatinine 0.88 2022 02:52 AM    BUN/Creatinine ratio 24 (H) 2022 02:52 AM    GFR est AA >60 2022 02:52 AM    GFR est non-AA >60 2022 02:52 AM    Calcium 7.9 (L) 2022 02:52 AM    Bilirubin, total 18.0 (H) 05/09/2022 04:36 AM    Alk.  phosphatase 288 (H) 05/09/2022 04:36 AM    Protein, total 5.8 (L) 05/09/2022 04:36 AM    Albumin 2.2 (L) 05/09/2022 04:36 AM    Globulin 3.6 05/09/2022 04:36 AM    A-G Ratio 0.6 (L) 05/09/2022 04:36 AM    ALT (SGPT) 78 05/09/2022 04:36 AM         Cultures:   Results     Procedure Component Value Units Date/Time    CULTURE, BLOOD, PAIRED [[id number]] Collected: 05/10/22 0936    Order Status: Completed Specimen: Blood Updated: 05/12/22 0610     Special Requests: NO SPECIAL REQUESTS        Culture result: NO GROWTH 2 DAYS       CULTURE, MRSA [[id number]] Collected: 05/09/22 0600    Order Status: Canceled Specimen: Nasal     CULTURE, URINE [[id number]]  (Abnormal)  (Susceptibility) Collected: 05/09/22 0116    Order Status: Completed Specimen: Urine from Clean catch Updated: 05/11/22 1419     Special Requests: NO SPECIAL REQUESTS        Pavillion Count --        >100,000  COLONIES/mL       Culture result: KLEBSIELLA PNEUMONIAE       Susceptibility      Klebsiella pneumoniae     ZACH     Amikacin ($) Susceptible     Ampicillin ($) Resistant     Ampicillin/sulbactam ($) Susceptible     Cefazolin ($) Susceptible     Cefepime ($$) Susceptible     Cefoxitin Susceptible     Ceftazidime ($) Susceptible     Ceftriaxone ($) Susceptible     Ciprofloxacin ($) Susceptible     Gentamicin ($) Susceptible     Levofloxacin ($) Susceptible     Meropenem ($$) Susceptible     Nitrofurantoin Intermediate     Piperacillin/Tazobac ($) Susceptible     Tobramycin ($) Susceptible     Trimeth/Sulfa Susceptible                  Linear View                   CULTURE, BLOOD, PAIRED [[id number]]  (Abnormal)  (Susceptibility) Collected: 05/09/22 0049    Order Status: Completed Specimen: Blood Updated: 05/12/22 0759     Special Requests: NO SPECIAL REQUESTS        Culture result:       KLEBSIELLA PNEUMONIAE GROWING IN 1 OF 2 BOTTLES DRAWN SITE = R HAND                  REMAINING BOTTLE(S) HAS/HAVE NO GROWTH SO FAR            (NOTE) PRELIMINARY REPORT OF GRAM NEGATIVE RODS GROWING IN 1 OF 2 BOTTLES, CALLED TO AND READ BACK BY MICHAEL MCCORMACK 5/10/22 0557 [facility]. Susceptibility      Klebsiella pneumoniae     ZACH (Preliminary)     Amikacin ($) Susceptible     Ampicillin ($) Resistant     Ampicillin/sulbactam ($) Susceptible     Cefazolin ($) Susceptible     Cefepime ($$) Susceptible     Cefoxitin Susceptible     Ceftazidime ($) Susceptible     Ceftriaxone ($) Susceptible     Ciprofloxacin ($) Susceptible     Gentamicin ($) Susceptible     Levofloxacin ($) Susceptible     Meropenem ($$) Susceptible     Piperacillin/Tazobac ($) Susceptible     Tobramycin ($) Susceptible     Trimeth/Sulfa Susceptible                  Linear View                          Impression:   Bacteremia  UTI  Hx of QTC prolongation  - afebrile, WBC 11    U/A (5/9) wbc 10-20 with 3+ bacteria, urine cx; klebsiella pneumoniae    Blood cx (5/9) klebsiella pneumoniae      CT of abd/pel (5/9) Cirrhosis with mild splenomegaly and small ascites. Cholecystolithiasis. Small  bilateral nonobstructing renal calculi. CXR (5/9) Unchanged chronic interstitial opacities in both lungs    Plan:   ·  continue with IV Rocephin, complete 2 weeks of ABX therapy, last dose 5/22  ·   ms; unable to use floroquinoline  ·  PICC line placement request and discharge ABX order in place  ·  fever work up if temp >= 100.4      Above plan of care discussed and agreed with Dr. Kirit Black     Pt is clear to discharge once outpatient IV abx therapy has arranged. ID team signing off. Please contact us with any questions.     Shade Benitez NP

## 2022-05-12 NOTE — PROGRESS NOTES
Problem: Mobility Impaired (Adult and Pediatric)  Goal: *Acute Goals and Plan of Care (Insert Text)  Description: FUNCTIONAL STATUS PRIOR TO ADMISSION: The patient ambulated with no AD. He was going to outpatient PT.     HOME SUPPORT PRIOR TO ADMISSION: The patient lives temporarily with his sister who assists with IADL's. Physical Therapy Goals  Initiated 5/11/2022  1. Patient will move from supine to sit and sit to supine , scoot up and down, and roll side to side in bed with independence within 7 day(s). 2.  Patient will transfer from bed to chair and chair to bed with independence using the least restrictive device within 7 day(s). 3.  Patient will perform sit to stand with independence within 7 day(s). 4.  Patient will ambulate with independence for 150 feet with the least restrictive device within 7 day(s). 5.  Patient will ascend/descend 4 stairs with 1 handrail(s) with supervision/set-up within 7 day(s). Outcome: Progressing Towards Goal   PHYSICAL THERAPY TREATMENT  Patient: Nichelle Aguilar (42 y.o. male)  Date: 5/12/2022  Diagnosis: Acute delirium [R41.0] Acute delirium       Precautions:    Chart, physical therapy assessment, plan of care and goals were reviewed. ASSESSMENT  Patient continues with skilled PT services and is progressing towards goals. He is making progress with his overall mobility and endurance. He was able to ambulate with standby assist with VSS and no complaints of pain or lightheadedness. Reviewed activity recommendations and safety considerations for home with patient and sister. Anticipate D/C home with IV abx soon. He has no further acute PT needs at this time and will decrease frequency to 1x/week to assess for any changes in status in the event D/C is delayed.      Current Level of Function Impacting Discharge (mobility/balance): supervision for safety    Other factors to consider for discharge: none         PLAN :  Patient continues to benefit from skilled intervention to address the above impairments. Continue treatment per established plan of care. to address goals. Recommendation for discharge: (in order for the patient to meet his/her long term goals)  No skilled physical therapy/ follow up rehabilitation needs identified at this time. This discharge recommendation:  Has not yet been discussed the attending provider and/or case management    IF patient discharges home will need the following DME: none       SUBJECTIVE:   Patient stated I feel pretty good.     OBJECTIVE DATA SUMMARY:   Critical Behavior:  Neurologic State: Alert  Orientation Level: Oriented X4  Cognition: Appropriate decision making,Appropriate for age attention/concentration,Appropriate safety awareness  Safety/Judgement: Good awareness of safety precautions  Functional Mobility Training:  Bed Mobility:     Supine to Sit: Modified independent; Additional time;Bed Modified (HOB elevated)              Transfers:  Sit to Stand: Supervision; Additional time  Stand to Sit: Supervision; Additional time        Bed to Chair: Supervision; Additional time                    Balance:  Sitting: Intact; Without support  Standing:  (up in chair after)  Ambulation/Gait Training:  Distance (ft): 200 Feet (ft)     Ambulation - Level of Assistance: Stand-by assistance; Additional time        Gait Abnormalities: Trunk sway increased              Speed/Leeanna: Pace decreased (<100 feet/min)                       Stairs: Therapeutic Exercises:     Pain Rating:  none    Activity Tolerance:   Good    After treatment patient left in no apparent distress:   Sitting in chair, Call bell within reach, and Caregiver / family present    COMMUNICATION/COLLABORATION:   The patients plan of care was discussed with: Registered nurse.      Mckenzie Baugh, PT   Time Calculation: 20 mins

## 2022-05-12 NOTE — PROGRESS NOTES
Hospitalist Progress Note      Hospital summary: This is a 18-year-old man with a past medical history significant for alcoholic liver cirrhosis, mitral valve prolapse, pulmonary sarcoidosis, who was in his usual state of health until the date of presentation at the emergency room when the patient developed confusion. The patient was brought to the emergency room by his spouse. When the patient arrived at the emergency room, CT scan of the head was obtained, this was negative. The patient's urinalysis was consistent with urinary tract infection. His ammonia level is within normal limits. The patient has been taking his medications as prescribed. The patient's spouse called his hepatologist who advised them to come to the emergency room. There was no fever, rigors, or chills reported. The patient denies any nausea or vomiting, also no abdominal pain. No record of prior admission to this hospital.  The patient has alcoholic liver cirrhosis. The patient last consumed alcohol in 11/2021. The patient is undergoing various evaluation prior to being referred for liver transplant at [facility]. The patient is not able to provide good history because of his confusion. 5/8/2022      Assessment/Plan:    Sepsis on poa   Klebsiella bacteremia / UTI   -  presented with change in mental status, leukocytosis, tachycardia, elevated lactic acid level  - blood : 1 of 2 growing lactose fermenting GNRs  - urine cx : Klebsiella   - rpt blood cx 5/10: NGTD   - appreciate ID input  - echo ordered   - c/w IV cefepime  - need final plan from ID regarding IV abx for discharge   Antibiotics have been ordered  Patient abx education tomorrow morning  DC tomorrow morning     Acute metabolic encephalopathy - resolved   Likely due to sepis  - normal ammonia level, salicylate level, Y05 and folic acid level. - UDS- negative. TSH normal   - supportive care     Alcoholic liver cirrhosis without ascites.     Elevated LFTs  - on lactulose  - appreciate Hepatology input   - pt on transplant list     Elevated Lipase   - no abd pain. - CT abd: Cirrhosis with mild splenomegaly and small ascites. Cholecystolithiasis. Small bilateral nonobstructing renal calculi. - appreciate gen surgery input  - tolerating diet    Coagulopathy/ Thrombocytopenia  - due to liver cirrhosis. Will monitor     Anemia -likely due to chronic disease.    - monitor hb, transfuse <7  - hb low stable      Tobacco abuse. - counseled to quit. patient does not want Nicoderm patch at this time. Code status: Full  DVT prophylaxis: SCDs  Disposition: TBD. outpt PT. Home likely tomorrow with IV abx   ----------------------------------------------    CC: AMS     S: Patient is seen and examined at bedside this AM. Seen for f/u of sepsis and bacteremia. No acute complaints. He is looking good and reports feeling ok. Review of Systems:  A comprehensive review of systems was negative.     O:  Visit Vitals  BP (!) 155/71   Pulse (!) 105   Temp 98.5 °F (36.9 °C)   Resp 15   Ht 6' 3\" (1.905 m)   Wt 81.6 kg (180 lb)   SpO2 100%   BMI 22.50 kg/m²       PHYSICAL EXAM:  Gen: NAD  HEENT: icterus+, oropharynx clear, MM moist  Neck: supple, trachea midline, no adenopathy  Heart: RRR, no MRG, no JVD, no peripheral edema  Lungs: CTA b/l, non-labored respirations  Abd: soft, NT, ND, BS+, no organomegaly  Extr: warm  Skin: dry, no rash  Neuro: CN II-XII grossly intact, normal speech, moves all extremities  Psych: normal mood, appropriate affect      Intake/Output Summary (Last 24 hours) at 5/12/2022 1554  Last data filed at 5/12/2022 0811  Gross per 24 hour   Intake 815 ml   Output 250 ml   Net 565 ml        Recent labs & imaging reviewed:  Recent Results (from the past 24 hour(s))   CBC WITH AUTOMATED DIFF    Collection Time: 05/12/22  2:52 AM   Result Value Ref Range    WBC 11.0 4.1 - 11.1 K/uL    RBC 1.90 (L) 4.10 - 5.70 M/uL    HGB 7.6 (L) 12.1 - 17.0 g/dL    HCT 22.9 (L) 36.6 - 50.3 %    .5 (H) 80.0 - 99.0 FL    MCH 40.0 (H) 26.0 - 34.0 PG    MCHC 33.2 30.0 - 36.5 g/dL    RDW 15.7 (H) 11.5 - 14.5 %    PLATELET 50 (L) 133 - 400 K/uL    MPV 11.1 8.9 - 12.9 FL    NRBC 0.3 (H) 0  WBC    ABSOLUTE NRBC 0.03 (H) 0.00 - 0.01 K/uL    NEUTROPHILS 73 32 - 75 %    BAND NEUTROPHILS 4 0 - 6 %    LYMPHOCYTES 12 12 - 49 %    MONOCYTES 9 5 - 13 %    EOSINOPHILS 2 0 - 7 %    BASOPHILS 0 0 - 1 %    IMMATURE GRANULOCYTES 0 %    ABS. NEUTROPHILS 8.5 (H) 1.8 - 8.0 K/UL    ABS. LYMPHOCYTES 1.3 0.8 - 3.5 K/UL    ABS. MONOCYTES 1.0 0.0 - 1.0 K/UL    ABS. EOSINOPHILS 0.2 0.0 - 0.4 K/UL    ABS. BASOPHILS 0.0 0.0 - 0.1 K/UL    ABS. IMM.  GRANS. 0.0 K/UL    DF MANUAL      RBC COMMENTS ANISOCYTOSIS  1+        RBC COMMENTS MACROCYTOSIS  1+        RBC COMMENTS POLYCHROMASIA  1+        RBC COMMENTS SCHISTOCYTES  1+       METABOLIC PANEL, BASIC    Collection Time: 05/12/22  2:52 AM   Result Value Ref Range    Sodium 132 (L) 136 - 145 mmol/L    Potassium 4.3 3.5 - 5.1 mmol/L    Chloride 106 97 - 108 mmol/L    CO2 18 (L) 21 - 32 mmol/L    Anion gap 8 5 - 15 mmol/L    Glucose 127 (H) 65 - 100 mg/dL    BUN 21 (H) 6 - 20 MG/DL    Creatinine 0.88 0.70 - 1.30 MG/DL    BUN/Creatinine ratio 24 (H) 12 - 20      GFR est AA >60 >60 ml/min/1.73m2    GFR est non-AA >60 >60 ml/min/1.73m2    Calcium 7.9 (L) 8.5 - 10.1 MG/DL   ECHO ADULT FOLLOW-UP OR LIMITED    Collection Time: 05/12/22 12:04 PM   Result Value Ref Range    IVSd 1.1 (A) 0.6 - 1.0 cm    LVIDd 4.6 4.2 - 5.9 cm    LVIDs 2.7 cm    LVOT Diameter 2.3 cm    LVPWd 1.2 (A) 0.6 - 1.0 cm    LVOT Peak Gradient 12 mmHg    LVOT Peak Velocity 1.7 m/s    RV Free Wall Peak S' 19 cm/s    LA Diameter 5.1 cm    AV Area by Peak Velocity 3.6 cm2    AV Peak Gradient 15 mmHg    AV Peak Velocity 2.0 m/s    MV A Velocity 1.13 m/s    MV E Wave Deceleration Time 281.6 ms    MV E Velocity 0.99 m/s    LV E' Lateral Velocity 13 cm/s    LV E' Septal Velocity 10 cm/s    MV PHT 81.7 ms MV Area by PHT 2.7 cm2    PV Peak Gradient 9 mmHg    PV Max Velocity 1.5 m/s    TAPSE 3.6 1.7 cm    TR Peak Gradient 50 mmHg    TR Max Velocity 3.54 m/s    Aortic Root 4.0 cm    Fractional Shortening 2D 41 28 - 44 %    LVIDd Index 2.19 cm/m2    LVIDs Index 1.29 cm/m2    LV RWT Ratio 0.52     LV Mass 2D 192.9 88 - 224 g    LV Mass 2D Index 91.9 49 - 115 g/m2    MV E/A 0.88     E/E' Ratio (Averaged) 8.76     E/E' Lateral 7.62     E/E' Septal 9.90     LVOT Area 4.2 cm2    LA Size Index 2.43 cm/m2    LA/AO Root Ratio 1.28     Ao Root Index 1.90 cm/m2    AV Velocity Ratio 0.85     HAYDEN/BSA Peak Velocity 1.7 cm2/m2     Recent Labs     05/12/22  0252 05/11/22  0205   WBC 11.0 9.8   HGB 7.6* 7.8*   HCT 22.9* 22.7*   PLT 50* 79*     Recent Labs     05/12/22 0252 05/11/22 0205 05/10/22  0324   * 133* 134*   K 4.3 5.0 4.1    106 107   CO2 18* 20* 18*   BUN 21* 25* 26*   CREA 0.88 1.08 0.83   * 117* 114*   CA 7.9* 7.7* 7.6*     No results for input(s): ALT, AP, TBIL, TBILI, TP, ALB, GLOB, GGT, AML, LPSE in the last 72 hours. No lab exists for component: SGOT, GPT, AMYP, HLPSE  No results for input(s): INR, PTP, APTT, INREXT, INREXT in the last 72 hours. No results for input(s): FE, TIBC, PSAT, FERR in the last 72 hours. Lab Results   Component Value Date/Time    Folate 11.5 05/09/2022 04:36 AM      No results for input(s): PH, PCO2, PO2 in the last 72 hours. No results for input(s): CPK, CKNDX, TROIQ in the last 72 hours.     No lab exists for component: CPKMB  Lab Results   Component Value Date/Time    Cholesterol, total 76 05/09/2022 04:36 AM    HDL Cholesterol 57 05/09/2022 04:36 AM    LDL, calculated 4.4 05/09/2022 04:36 AM    Triglyceride 73 05/09/2022 04:36 AM    CHOL/HDL Ratio 1.3 05/09/2022 04:36 AM     No results found for: GLUCPOC  Lab Results   Component Value Date/Time    Color DARK YELLOW 05/09/2022 12:49 AM    Appearance CLOUDY (A) 05/09/2022 12:49 AM    Specific gravity 1.017 05/09/2022 12:49 AM    pH (UA) 5.5 05/09/2022 12:49 AM    Protein Negative 05/09/2022 12:49 AM    Glucose Negative 05/09/2022 12:49 AM    Ketone Negative 05/09/2022 12:49 AM    Bilirubin NEGATIVE  05/05/2015 12:46 PM    Urobilinogen 1.0 05/09/2022 12:49 AM    Nitrites Negative 05/09/2022 12:49 AM    Leukocyte Esterase MODERATE (A) 05/09/2022 12:49 AM    Epithelial cells FEW 05/09/2022 12:49 AM    Bacteria 3+ (A) 05/09/2022 12:49 AM    WBC 10-20 05/09/2022 12:49 AM    RBC 0-5 05/09/2022 12:49 AM       Med list reviewed  Current Facility-Administered Medications   Medication Dose Route Frequency    cefTRIAXone (ROCEPHIN) 2 g in 0.9% sodium chloride 20 mL IV syringe  2 g IntraVENous Q24H    lactated Ringers infusion  75 mL/hr IntraVENous CONTINUOUS    citalopram (CELEXA) tablet 10 mg  10 mg Oral DAILY    lactulose (CHRONULAC) 10 gram/15 mL solution 30 mL  20 g Oral DAILY    pantoprazole (PROTONIX) tablet 40 mg  40 mg Oral DAILY    sodium chloride (NS) flush 5-40 mL  5-40 mL IntraVENous Q8H    sodium chloride (NS) flush 5-40 mL  5-40 mL IntraVENous PRN    polyethylene glycol (MIRALAX) packet 17 g  17 g Oral DAILY PRN    ondansetron (ZOFRAN ODT) tablet 4 mg  4 mg Oral Q8H PRN    Or    ondansetron (ZOFRAN) injection 4 mg  4 mg IntraVENous Q6H PRN    L.acidophilus-paracasei-S.thermophil-bifidobacter (RISAQUAD) 8 billion cell capsule  1 Capsule Oral DAILY    sodium chloride (NS) flush 5-10 mL  5-10 mL IntraVENous PRN       Care Plan discussed with:  Patient/Family and Nurse    Bethany Rogers MD  Internal Medicine  Date of Service: 5/12/2022

## 2022-05-12 NOTE — PROGRESS NOTES
PICC Placement Note    PRE-PROCEDURE VERIFICATION  Correct Procedure: yes  Correct Site:  yes  Temperature: Temp: 98.5 °F (36.9 °C), Temperature Source: Temp Source: Oral  Recent Labs     05/12/22  0252   BUN 21*   CREA 0.88   PLT 50*   WBC 11.0     Allergies: Contrast agent [iodine] and Penicillin g  Education materials for PICC Care given: yes. See Patient Education activity for further details. PICC Booklet placed at bedside: yes    PROCEDURE DETAIL  Consent was obtained and all questions were answered related to risks and benefits. A double lumen PICC line was inserted, as a sterile procedure using ultrasound and modified Seldinger technique for Home IV Therapy. The following documentation is in addition to the PICC properties in the lines/airways flowsheet :  Lot #: [id number]  Lidocaine 1% administered intradermally :yes  Internal Catheter Total Length: 44 (cm)  Vein Selection for PICC:right basilic  Central Line Bundle followed yes  Complication Related to Insertion:no    The placement was verified by EKG, MAX P WAVE @ 44 (cm). PER EKG PICC TIP @ C/A junction.       Line is okay to use: yes    Serenity Lyle RN

## 2022-05-12 NOTE — DISCHARGE INSTRUCTIONS
Wound Care:  Left arm- Every other day cleanse with normal saline, wipe wound bed clean and dry, apply a piece of Xeroform gauze that has been folded in half, cover with 4 x 4's and secure with roll gauze and tape.

## 2022-05-12 NOTE — PROGRESS NOTES
[facility] hospitals, MD, [address], [place], [place]      DANNA Brink, [facility]     Aileen Norwood, [facility]   FIDELINA Roberts, [facility]       Elisabethmelissa Cantu [facility] 136    at [address]    [address] [place] [address], [facility] Rob De La Cruz  22.    [phone]    FAX: [address] [place] [address] Avenue    [address], [address]    [phone]    FAX: [phone]         HEPATOLOGY PROGRESS NOTE  The patient is well known to and regularly cared for at [facility]. He is a 59 y.o.  male who was found to have cirrhosis in 11/2018 when he was hospitalized with jaundice. Serologic evaluation for markers of chronic liver disease was negative. He had been consuming a 1/5 bottle of alcohol daily. He became abstinent from alcohol in 11/2021. He has not previous developed any of the complications of cirrhosis. He was brought to the ED by his family after he developed confusion. In the ED Laboratory studies were significant for:    WBC 15, HB 9.5 gms, PLT 54, Sna 131, Scr 1.27 mg, TBILI 20 mg, INR 2.1, Sammonia 10,   UA was significant for blood, LE moderate, 10-20 WBC, 3+ bacteria    Imaging of the liver with CT scan demonstrated cirrhosis, no liver mass, minimal ascites. A CT scan of the head demonstrated no acute process. I have reviewed the Emergency room note, Hospital admission note, Notes by all other physicians who have seen the patient during this hospitalization to date. I have reviewed the problem list, all past medical history and the reason for this hospitalization. I have reviewed the allergies and the medications the patient was taking at home prior to this hospitalization. Hospital Course:  Urine CX Klebsiela. Blood culture gm neg rods. IV ABX per ID. Liver function stable    Hepatology Plan:  Complete course of IV ABX. Continue lactulose. NO diuretics for now.     ASSESSMENT AND PLAN:  Cirrhosis  The diagnosis of cirrhosis is based upon imaging, laboratory studies, complications of cirrhosis. 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        The CTP is 11. Child class C. The MELD score is up to 27.     He has completed nearly all testing required to be considered for liver transplant. Will be seen at [facility] last week May.     Alcohol liver disease  The diagnosis is based upon a history of consuming significant alcohol on a daily basis for many years, pattern of AST>ALT, an elevation in ferritin and FE saturation, serology that is negative for other causes of chronic liver disease.       The histologic severity has not been defined.       The patient has consumed alcoholic in excess for many years. He reduced alcohol use to 6 drinks per day when he found out he liver disease. The patient has been abstinent from alcohol since 11/2021 when he was hospitalized for jaundice. Discussed the need to reman abstinent from alcohol. PEth test in 4/2020 was negative.       Elevation in Ferritin  There is an elevation in ferritin with elevated iron saturation. HFE genetic testing was negative  The elevation in ferritin may be secondary to alcohol use and will come down to normal with abstinence.     Ascites   Ascites developed for the first time in 5/2022. Ascites was small-moderate and seen on imaging but not clinically apparent. He is not on diuretics. He has never had Paracentesis and does not need this now. Screening for Esophageal varices   The patient does not have esophageal or gastric varices. The last EGD to assess for varices was performed in 4/2022.       Hepatic encephalopathy   The patient was brought to the ED for confusion but ammonia was <10  CT scan of the head was negative for any acute event.  Confusion was due to UTI and has resolved with hydration and ABX. He was started on lactulose once every day which is fine. UTI Sepsis  Blood and urine culture are both positive for gm neg rods. On IV ABX per ID     Thrombocytopenia   This is secondary to cirrhosis. There is no evidence of overt bleeding. No treatment is required. The platelet count is adequate for the patient to undergo procedures without the need for platelet transfusion or platelet growth factors.     Anemia   This is due to multifactorial causes including portal hypertension with chronic GI blood loss, bone marrow suppression secondary to alcohol. The most recent FE studies were from 3/2022. The serum ferritin is 937  The FE saturation is 87  Will schedule for EGD to assess for UGI blood loss.       Screening for Hepatocellular Carcinoma  HCC screening has not been not been performed   Will perform liver US to screen for [address]       PHYSICAL EXAMINATION:  VS: per nursing note  General:  No acute distress. Eyes:  Sclera deeply icteric  ENT:  No oral lesions. Thyroid normal.  Nodes:  No adenopathy. Skin:  Spider angiomata. Jaundice. Respiratory:  Lungs clear to auscultation. Cardiovascular:  Regular heart rate. Abdomen:  Soft non-tender, No obvious ascites. Some ascites may be present  Extremities:  No lower extremity edema. Neurologic:  Alert and oriented. Cranial nerves grossly intact. No asterixis. LABORATORY:  Results for Cole Holder (MRN [id number]) as of 5/9/2022 19:39   Ref.  Range 5/8/2022 22:21 5/9/2022 04:36   WBC Latest Ref Range: 4.1 - 11.1 K/uL 15.0 (H) 12.3 (H)   HGB Latest Ref Range: 12.1 - 17.0 g/dL 9.5 (L) 8.5 (L)   PLATELET Latest Ref Range: 150 - 400 K/uL 54 (L) 39 (LL)   INR Latest Ref Range: 0.9 - 1.1   2.1 (H)    Sodium Latest Ref Range: 136 - 145 mmol/L 131 (L) 134 (L)   Potassium Latest Ref Range: 3.5 - 5.1 mmol/L 4.2 3.8   Chloride Latest Ref Range: 97 - 108 mmol/L 98 104   CO2 Latest Ref Range: 21 - 32 mmol/L 24 22   Glucose Latest Ref Range: 65 - 100 mg/dL 201 (H) 129 (H)   BUN Latest Ref Range: 6 - 20 MG/DL 34 (H) 30 (H)   Creatinine Latest Ref Range: 0.70 - 1.30 MG/DL 1.27 0.93   Bilirubin, total Latest Ref Range: 0.2 - 1.0 MG/DL 20.3 (H) 18.0 (H)   Albumin Latest Ref Range: 3.5 - 5.0 g/dL 2.4 (L) 2.2 (L)   ALT Latest Ref Range: 12 - 78 U/L 93 (H) 78   AST Latest Ref Range: 15 - 37 U/L 56 (H) 49 (H)   Alk. phosphatase Latest Ref Range: 45 - 117 U/L 325 (H) 288 (H)   Ammonia, plasma Latest Ref Range: <32 UMOL/L <10        RADIOLOGY:  CT scan abdomen without IV contrast.  Changes consistent with cirrhosis. No liver mass lesions. No dilated bile ducts. Small ascites. Head CT. No acute process.       Blair Verdin MD  [facility] 13 of [address], [address] [facility] 22.  [phone]  [address] [place] [address]

## 2022-05-12 NOTE — PROGRESS NOTES
LOUIS: Anticipate discharge home with HH SN and IV abx with [address] pending medical progress. Transportation likely in car with family. RUR: 15%    Disposition: Patient admitted 5/9 for acute delirium. Patient currently resides in a one level home with his sister and brother-in-law. DME: none. Patient is independent in ADLs/ambulation, but does not drive at this time. Does have access to a wheelchair if needed at discharge. Hx: alcoholic liver cirrhosis, mitral valve prolapse, pulmonary sarcoidosis. Patient is currently working towards getting on the liver transplant list at [facility]. IV abx ordered and HH SN. CM met with patient to discuss choice. Referrals submitted via [address] Transition of Care Plan:     The Plan for Transition of Care is related to the following treatment goals: [facility] SN/IV abx    The Patient and/or patient representative, Pao Jose, was provided with a choice of provider and agrees  with the discharge plan. Yes [x] No []    A Freedom of choice list was provided with basic dialogue that supports the patient's individualized plan of care/goals and shares the quality data associated with the providers.        Yes [x] No []    Juan Sanchez, [address] [place] [address],[address]  [phone]

## 2022-05-12 NOTE — WOUND CARE
Wound Care Note:     New consult placed by nurse request for dressing change attempted    Chart shows:  Admitted for acute delirium   Past Medical History:   Diagnosis Date    [facility]     Mitral valve prolapse     Sarcoidosis, lung ([facility]      WBC = 11.0 on 5/12/22  Admitted from home    Assessment:   Patient is A&O x 4, communicative, continent with no assistance needed in repositioning. Bed: [facility]  Diet: Adult diet regular  Patient reports no pain. Right heel, bilateral buttocks, and sacral skin intact and without erythema. Left heel skin intact with blanchable erythema. 1. POA left lower proximal arm skin tears (2) area measures 1.5 cm x 4.5 cm x 0.1 cm, wound bed is beefy red, moderate amount of sero/sang drainage, wound edges are open, tia-wound intact. Xeroform gauze, 4 x 4 and roll gauze applied. 2.  POA left lower distal arm skin tear measures 1.5 cm x 2.1 cm x 0.1 cm, wound bed is beefy red, moderate amount of sero/xang drainage, wound edges are open, tia-wound intact. Xeroform gauze, 4 x 4 and roll gauze applied. Spoke with Dr. Edmond Suazo, wound care orders obtained. Patient up in chair. Recommendations:    Left arm- Every other day cleanse with normal saline, wipe wound bed clean and dry, apply a piece of Xeroform gauze that has been folded in half, cover with 4 x 4's and secure with roll gauze and tape. Skin Care & Pressure Prevention:  Minimize layers of linen/pads under patient to optimize support surface. Turn/reposition approximately every 2 hours and offload heels.   Manage incontinence / promote continence   Nourishing Skin Cream to dry skin, minimize use of briefs when able    Discussed above plan with patient & Hien Loving RN    Transition of Care: Plan to follow as needed while admitted to hospital.    ELIAS Hanson, RN, [facility]  Certified Wound and Ostomy Nurse  office [phone]  pager [phone] or call  to page

## 2022-05-12 NOTE — PROGRESS NOTES
Problem: Self Care Deficits Care Plan (Adult)  Goal: *Acute Goals and Plan of Care (Insert Text)  Description:   FUNCTIONAL STATUS PRIOR TO ADMISSION: Patient was staying with sister and brother in law. Was independent in basic ADL, requires assist for IADL. Was going to outpatient PT to assist with strengthening prior to liver transplant     HOME SUPPORT: Lived with sister and brother in law in ranch style home. Occupational Therapy Goals  Initiated 5/11/2022  1. Patient will perform grooming with modified independence within 7 day(s). 2.  Patient will perform upper body dressing with modified independence within 7 day(s). 3.  Patient will perform lower body dressing with modified independence within 7 day(s). 4.  Patient will perform all aspects of toileting with modified independence within 7 day(s). 5.  Patient will utilize energy conservation techniques during functional activities with verbal cues within 7 day(s). Outcome: Progressing Towards Goal   OCCUPATIONAL THERAPY TREATMENT  Patient: Fred Chino (1 y.o. male)  Date: 5/12/2022  Diagnosis: Acute delirium [R41.0] Acute delirium       Precautions:    Chart, occupational therapy assessment, plan of care, and goals were reviewed. ASSESSMENT  Patient continues with skilled OT services and is progressing towards goals. Tolerated B UE exercises in recliner with S with just right challenge with red medium resistance band, rest breaks required, patient noted up to bathroom and completing mobility with PT. Tolerated session well with all needs met. Continue to recommend home with family, no OT needs. Current Level of Function Impacting Discharge (ADLs): CGA    Other factors to consider for discharge: supportive family         PLAN :  Patient continues to benefit from skilled intervention to address the above impairments. Continue treatment per established plan of care to address goals.     Recommend with staff: bathroom for toileting    Recommend next OT session: theraband review, LB ADL retraining    Recommendation for discharge: (in order for the patient to meet his/her long term goals)  No skilled occupational therapy/ follow up rehabilitation needs identified at this time. This discharge recommendation:  Has been made in collaboration with the attending provider and/or case management    IF patient discharges home will need the following DME: patient owns DME required for discharge       SUBJECTIVE:   Patient stated This is enough.  re: just right challenge for exercises    OBJECTIVE DATA SUMMARY:   Cognitive/Behavioral Status:  Neurologic State: Alert  Orientation Level: Oriented X4  Cognition: Appropriate decision making; Appropriate for age attention/concentration; Appropriate safety awareness             Functional Mobility and Transfers for ADLs:  Bed Mobility:  Supine to Sit: Modified independent; Additional time;Bed Modified (HOB elevated)    Transfers:  Sit to Stand: Supervision; Additional time     Bed to Chair: Supervision; Additional time    Balance:  Sitting: Intact; Without support  Standing:  (up in chair after)    ADL Intervention:     Declined    Patient instructed and indicated understanding the benefits of maintaining activity tolerance, functional mobility, and independence with self care tasks during acute stay  to ensure safe return home and to baseline. Encouraged patient to increase frequency and duration OOB, be out of bed for all meals, perform daily ADLs (as approved by RN/MD regarding bathing etc), and performing functional mobility to/from bathroom. Therapeutic Exercises:   Red theraband B UE issued  Horizontal abduction  Unilateral shoulder flexion  Overhead horizontal abduction  5x/2 sets  Then 1 set of 10 reps, issued HEP for 3x daily with 10 reps and reinforced on board for good carryover.     Pain:      Activity Tolerance:   SpO2 stable on RA and requires frequent rest breaks    After treatment patient left in no apparent distress:   Sitting in chair and Call bell within reach    COMMUNICATION/COLLABORATION:   The patients plan of care was discussed with: Registered nurse.      Peter Xavier OT  Time Calculation: 13 mins

## 2022-05-13 PROBLEM — R41.0 ACUTE DELIRIUM: Status: RESOLVED | Noted: 2022-01-01 | Resolved: 2022-01-01

## 2022-05-13 NOTE — PROGRESS NOTES
Problem: Falls - Risk of  Goal: *Absence of Falls  Description: Document  [place] Fall Risk and appropriate interventions in the flowsheet.   Outcome: Resolved/Met     Problem: Patient Education: Go to Patient Education Activity  Goal: Patient/Family Education  Outcome: Resolved/Met     Problem: Discharge Planning  Goal: *Discharge to safe environment  Outcome: Resolved/Met  Goal: *Knowledge of medication management  Outcome: Resolved/Met  Goal: *Knowledge of discharge instructions  Outcome: Resolved/Met     Problem: Patient Education: Go to Patient Education Activity  Goal: Patient/Family Education  Outcome: Resolved/Met     Problem: Patient Education: Go to Patient Education Activity  Goal: Patient/Family Education  Outcome: Resolved/Met     Problem: Patient Education: Go to Patient Education Activity  Goal: Patient/Family Education  Outcome: Resolved/Met

## 2022-05-13 NOTE — DISCHARGE SUMMARY
Discharge Summary       PATIENT ID: Taylor Joseph  MRN: [id number]   YOB: 1965    DATE OF ADMISSION: 5/8/2022 10:11 PM     DATE OF DISCHARGE: 5/13/22   PRIMARY CARE PROVIDER: Debra Fan DO     ATTENDING PHYSICIAN: Bethany Rogers MD  DISCHARGING PROVIDER: Bethany Rogers MD    To contact this individual call [phone] and ask the  to page. If unavailable ask to be transferred the Adult Hospitalist Department. CONSULTATIONS: IP CONSULT TO GENERAL SURGERY  IP CONSULT TO HEPATOLOGY  IP CONSULT TO INFECTIOUS DISEASES    PROCEDURES/SURGERIES: * No surgery found *    ADMITTING DIAGNOSES & HOSPITAL COURSE:   This is a 57-year-old man with a past medical history significant for alcoholic liver cirrhosis, mitral valve prolapse, pulmonary sarcoidosis, who was in his usual state of health until the date of presentation at the emergency room when the patient developed confusion. The patient was brought to the emergency room by his spouse. When the patient arrived at the emergency room, CT scan of the head was obtained, this was negative. The patient's urinalysis was consistent with urinary tract infection. His ammonia level is within normal limits. The patient has been taking his medications as prescribed. The patient's spouse called his hepatologist who advised them to come to the emergency room. There was no fever, rigors, or chills reported. The patient denies any nausea or vomiting, also no abdominal pain. No record of prior admission to this hospital.  The patient has alcoholic liver cirrhosis. The patient last consumed alcohol in 11/2021. The patient is undergoing various evaluation prior to being referred for liver transplant at [facility]. The patient is not able to provide good history because of his confusion. 1.  Acute delirium. We will admit the patient for further evaluation and treatment. This is most likely due to metabolic event.   We will identify and treat underlying etiologic factors including acute cystitis. The patient's ammonia level is within normal limits. The patient stopped alcohol abuse in 11/2021. We will check salicylate level. We will check J02 and folic acid level. The patient may require further evaluation including MRI and Neurology consult if the patient did not return to normal baseline mental status after treatment of the underlying metabolic etiological factors. 2.  Alcoholic liver cirrhosis without ascites. We will resume preadmission medication. Hepatology consult will be requested to assist in further evaluation and treatment. 3.  Acute pancreatitis. The patient presented with elevated lipase level. We will carry out conservative treatment including fluid therapy and pain control. We will monitor the patient's lipase level. We will check C-reactive protein level to assess the severity of the acute pancreatitis. The patient's CT scan of the abdomen and pelvis shows cholecystolithiasis. General Surgery consult will be requested to assist in evaluation and treatment of suspected gallstone pancreatitis. 4.  Tobacco abuse. The patient advised to quit smoking. The patient does not want to be placed on Nicoderm patch at this time. 5.  Coagulopathy. This is due to the patient's liver disease. We will monitor the patient closely. 6.  Thrombocytopenia. This is also due to liver disease. The patient is asymptomatic. We will continue to monitor and avoid heparin product. 7.  Hyperglycemia. We will check hemoglobin A1c level. The patient has no history of diabetes. 8.  Acute cystitis with hematuria. The patient will be started on antibiotics. This is the most likely cause of the patient's acute delirium. We will await urine culture. 9.  Anemia. This is most likely due to chronic disease. We will carry out anemia workup including checking stool guaiac to rule out occult GI bleed. 10.  Hyponatremia.   This is mild and most likely due to volume depletion. We will carry out fluid therapy and repeat sodium level. 11.  Suspected sepsis. The patient presented with change in mental status, leukocytosis, tachycardia, elevated lactic acid level, and urinalysis consistent with urinary tract infection. We will start the patient on vancomycin and Zosyn for the suspected sepsis. The sepsis is most likely coming from urinary tract infection. We will obtain CT scan of the chest to evaluate the patient for other possible sources of sepsis. 12.  Sinus tachycardia. This is most likely due to the sepsis. We will carry out fluid therapy. We will check serial cardiac markers to rule out acute myocardial infarction. We will check TSH level. We will also check urine drug screen. We will check D-dimer to evaluate the patient for thromboembolism as another possible cause of tachycardia at rest.    Hospital course  Patient was admitted. Started growing gram-negative rods in blood and urine. ID saw the patient. Patient was started on antibiotics. Patient grew Klebsiella. Patient was also seen by hepatology. The cirrhosis was stable throughout the stay. He was placed on some lactulose to improve mental status. Patient improved with antibiotics and lactulose. PICC line was placed and orders placed for ceftriaxone therapy as outpatient. Patient was discharged with stable cirrhosis and to finish IV antibiotic treatment. DISCHARGE DIAGNOSES / PLAN:      1. Sepsis secondary to Klebsiella bacteremia/UTI. Resolved. Finish IV antibiotic treatment outpatient with PICC line. 2. Klebsiella bacteremia and UTI. As above. 3. Acute metabolic encephalopathy. Resolved. Due to sepsis. 4. Alcoholic liver cirrhosis without ascites. Stable. 5. Elevated lipase. Incidental.  6. Coagulopathy/thrombocytopenia secondary to cirrhosis. Stable. 7. Anemia secondary to chronic disease/cirrhosis. Stable. 8. Tobacco use disorder. Stable. Counseled. PENDING TEST RESULTS:   At the time of discharge the following test results are still pending: none    FOLLOW UP APPOINTMENTS:    Follow-up Information     Follow up With Specialties Details Why Contact Info    Joanne Hobson, [address]   [phone]             ADDITIONAL CARE RECOMMENDATIONS: none    DIET: Regular Diet    ACTIVITY: Activity as tolerated    WOUND CARE: none    EQUIPMENT needed: none      DISCHARGE MEDICATIONS:  Discharge Medication List as of 5/13/2022 12:37 PM      CONTINUE these medications which have NOT CHANGED    Details   lactulose (CHRONULAC) 10 gram/15 mL solution Take 30 mL by mouth daily. , Normal, Disp-900 mL, R-2      citalopram (CELEXA) 10 mg tablet Historical Med      levocetirizine (Xyzal) 5 mg tablet Take 5 mg by mouth daily as needed for Allergies. , Historical Med      spironolactone (Aldactone) 100 mg tablet Take 1 Tablet by mouth daily. , Normal, Disp-90 Tablet, R-3      furosemide (Lasix) 40 mg tablet Take 1 Tablet by mouth daily. , Normal, Disp-90 Tablet, R-3      pantoprazole (Protonix) 20 mg tablet Take 2 Tablets by mouth daily. , Normal, Disp-90 Tablet, R-1         STOP taking these medications       methylPREDNISolone (MEDROL) 32 mg tablet Comments:   Reason for Stopping:         cefUROXime (Ceftin) 500 mg tablet Comments:   Reason for Stopping:                 NOTIFY YOUR PHYSICIAN FOR ANY OF THE FOLLOWING:   Fever over 101 degrees for 24 hours. Chest pain, shortness of breath, fever, chills, nausea, vomiting, diarrhea, change in mentation, falling, weakness, bleeding. Severe pain or pain not relieved by medications. Or, any other signs or symptoms that you may have questions about.     DISPOSITION:  X  Home With:   OT  PT X HH  RN       Long term SNF/Inpatient Rehab    Independent/assisted living    Hospice    Other:       PATIENT CONDITION AT DISCHARGE:     Functional status    Poor    X Deconditioned     Independent      Cognition    X Lucid     Forgetful     Dementia      Catheters/lines (plus indication)    Kang    X PICC     PEG     None      Code status   X  Full code     DNR      PHYSICAL EXAMINATION AT DISCHARGE:  Gen: NAD  HEENT: icterus+, oropharynx clear, MM moist  Neck: supple, trachea midline, no adenopathy  Heart: RRR, no MRG, no JVD, no peripheral edema  Lungs: CTA b/l, non-labored respirations  Abd: soft, NT, ND, BS+, no organomegaly  Extr: warm  Skin: dry, no rash  Neuro: CN II-XII grossly intact, normal speech, moves all extremities  Psych: normal mood, appropriate affect    CHRONIC MEDICAL DIAGNOSES:  Problem List as of 5/13/2022 Date Reviewed: 5/9/2022          Codes Class Noted - Resolved    Calculus of gallbladder without cholecystitis without obstruction ICD-10-CM: K80.20  ICD-9-CM: 574.20  5/9/2022 - Present        Cirrhosis ([facility] ICD-10-CM: K74.60  ICD-9-CM: 571.5  Unknown - Present        Alcoholic liver disease ([facility] ICD-10-CM: K70.9  ICD-9-CM: 571.3  Unknown - Present        Sarcoidosis, lung ([facility] ICD-10-CM: D86.0  ICD-9-CM: 135, 517.8  Unknown - Present        Mitral valve prolapse ICD-10-CM: I34.1  ICD-9-CM: 424.0  Unknown - Present        * (Principal) RESOLVED: Acute delirium ICD-10-CM: R41.0  ICD-9-CM: 780.09  5/9/2022 - 5/13/2022              Greater than 35 minutes were spent with the patient on counseling and coordination of care    Signed:   Paris Middleton MD  5/13/2022  5:30 PM

## 2022-05-13 NOTE — PROGRESS NOTES
Discharge instructions reviewed with patient and spouse, all questions answered. Iv and telemetry removed. Patient taken down in wheelchair. End of care.

## 2022-05-16 NOTE — ED NOTES
SBAR given to Lorna Martinez RN at [facility] ED from immediate transfer

## 2022-05-16 NOTE — TELEPHONE ENCOUNTER
Pts sister, Mili Trejo, said she spoke with Dr. Cynthia Solis over the weekend after pt fell in the bathroom. Pt has back pain and right-sided chest pain - right side hit the bathtub when he fell. Sister reports pt is in excruciating pain and it hurts to breathe. Tylenol isn't helping. I suggested she try to get him in with ortho today. If they can't see him, I suggested she take him to Urgent Care.

## 2022-05-16 NOTE — TELEPHONE ENCOUNTER
Petra Randall called back concerned about pt getting SOB with activity. Per home infusion nurse, pt has diminished breath sounds on the left and none on RLL. Sister and nurse are also concerned about pt being dehydrated. Pt has only urinated twice today. He is also nauseous. I recommended sister bring him to the ED for further evaluation.

## 2022-05-16 NOTE — ED TRIAGE NOTES
Per chart noted , pt \"getting SOB with activity. Per home infusion nurse, pt has diminished breath sounds on the left and none on RLL. Sister and nurse are also concerned about pt being dehydrated.  Pt has only urinated twice today\" +nausea , pt also fell in the bathroom over the weekend, c/o back pain and right sided chest pain

## 2022-05-16 NOTE — ED PROVIDER NOTES
HPI       Please note that this dictation was completed with [employer], the computer voice recognition software. Quite often unanticipated grammatical, syntax, homophones, and other interpretive errors are inadvertently transcribed by the computer software. Please disregard these errors. Please excuse any errors that have escaped final proofreading. 57-year-old male with a history of alcoholic cirrhosis and coagulopathy, thrombocytopenia, sarcoidosis here with difficulty breathing onset this morning. Patient reportedly had a ground-level fall yesterday. He has a large bruise and tenderness to his posterior right back area of thorax. He is also has some fluid retention recently and they have been increasing his diuretic. His last INR on May 8 was 2.1. His platelets have been in the 70s in the past.  He is unsure if he struck his head but did not have loss of consciousness. Denies chest pain, neck pain, low localized weakness or numbness or other complaints. Recent admit 5/8 and 5/13/22:    1. Sepsis secondary to Klebsiella bacteremia/UTI. Resolved. Finish IV antibiotic treatment outpatient with PICC line. 2. Klebsiella bacteremia and UTI. As above. 3. Acute metabolic encephalopathy. Resolved. Due to sepsis. 4. Alcoholic liver cirrhosis without ascites. Stable. 5. Elevated lipase. Incidental.  6. Coagulopathy/thrombocytopenia secondary to cirrhosis. Stable. 7. Anemia secondary to chronic disease/cirrhosis. Stable. 8. Tobacco use disorder. Stable. Counseled. Past Medical History:   Diagnosis Date    Cirrhosis ([address]     Mitral valve prolapse     Sarcoidosis, lung ([address]        Past Surgical History:   Procedure Laterality Date    HX COLONOSCOPY      HX SEPTOPLASTY      NE APPENDECTOMY           No family history on file.     Social History     Socioeconomic History    Marital status: SINGLE     Spouse name: Not on file    Number of children: Not on file    Years of education: Not on file    Highest education level: Not on file   Occupational History    Not on file   Tobacco Use    Smoking status: Current Some Day Smoker    Smokeless tobacco: Never Used    Tobacco comment: occassional cigar   Vaping Use    Vaping Use: Former    Substances: Nicotine    Devices: Pre-filled or refillable cartridge   Substance and Sexual Activity    Alcohol use: Not Currently     Alcohol/week: 2.0 standard drinks     Types: 2 Cans of beer per week     Comment: pt states no longer     Drug use: No    Sexual activity: Not on file   Other Topics Concern    Not on file   Social History Narrative    Not on file     Social Determinants of Health     Financial Resource Strain:     Difficulty of Paying Living Expenses: Not on file   Food Insecurity:     Worried About Running Out of Food in the Last Year: Not on file    Amado of Food in the Last Year: Not on file   Transportation Needs:     Lack of Transportation (Medical): Not on file    Lack of Transportation (Non-Medical):  Not on file   Physical Activity:     Days of Exercise per Week: Not on file    Minutes of Exercise per Session: Not on file   Stress:     Feeling of Stress : Not on file   Social Connections:     Frequency of Communication with Friends and Family: Not on file    Frequency of Social Gatherings with Friends and Family: Not on file    Attends Restorationist Services: Not on file    Active Member of [address] [place] [address] or Organizations: Not on file    Attends Club or Organization Meetings: Not on file    Marital Status: Not on file   Intimate Partner Violence:     Fear of Current or Ex-Partner: Not on file    Emotionally Abused: Not on file    Physically Abused: Not on file    Sexually Abused: Not on file   Housing Stability:     Unable to Pay for Housing in the Last Year: Not on file    Number of [place] in the Last Year: Not on file    Unstable Housing in the Last Year: Not on file         ALLERGIES: Contrast agent [iodine] and Penicillin g    Review of Systems   Constitutional: Negative for chills and fever. All other systems reviewed and are negative. Vitals:    05/16/22 1511   BP: 113/67   Pulse: (!) 122   Resp: 24   Temp: 97.6 °F (36.4 °C)   SpO2: 93%            Physical Exam  Vitals and nursing note reviewed. Constitutional:       Appearance: He is well-developed. Comments: Chronically ill appearing. jaundiced   HENT:      Head: Normocephalic and atraumatic. Nose: No congestion or rhinorrhea. Mouth/Throat:      Pharynx: No oropharyngeal exudate. Eyes:      General: No scleral icterus. Right eye: No discharge. Left eye: No discharge. Conjunctiva/sclera: Conjunctivae normal.      Pupils: Pupils are equal, round, and reactive to light. Neck:      Comments: Requested c-collar  Cardiovascular:      Rate and Rhythm: Regular rhythm. Tachycardia present. Heart sounds: No murmur heard. No friction rub. No gallop. Pulmonary:      Effort: Pulmonary effort is normal. No respiratory distress. Breath sounds: No wheezing or rales. Comments: Decreased breath sounds at right base. Patient with posterior right sided thorax area with ecchymosis and tenderness. Chest:      Chest wall: Tenderness (posterior right thorax) present. Abdominal:      General: There is distension (with fluid wave). Palpations: Abdomen is soft. Tenderness: There is no abdominal tenderness. There is no guarding. Musculoskeletal:         General: No tenderness or deformity. Normal range of motion. Cervical back: Normal range of motion and neck supple. Right lower leg: Edema present. Left lower leg: Edema present. Lymphadenopathy:      Cervical: No cervical adenopathy. Skin:     General: Skin is warm and dry. Coloration: Skin is jaundiced. Skin is not pale. Findings: No rash. Neurological:      Mental Status: He is alert and oriented to person, place, and time. Cranial Nerves: No cranial nerve deficit. Coordination: Coordination normal.              [facility]  ED Course as of 05/16/22 1648   Mon May 16, 2022   1551 Notified by radiology Dr. Dede Gallagher that patient had moderate pneumothorax. I have informed Namoy Zuleta the charge nurse that the patient needs a room ASAP. [RG]   12 D/w Dr. Rebeca Linder -general surgery. He agrees that patient should be transferred stat to a trauma center. He agrees that we should not place a chest tube at this time given his known coagulopathy and relative stability. We are calling the transfer center. Calling transportation as well. Patient agreeable to transfer to [facility]. Dr. Rebeca Linder with general surgery are he states that patient needs thoracic surgery which we do not have available. [RG]      ED Course User Index  [RG] Yoli Rosenberg MD       Procedures    5:01 PM  D/w Dr. Korin Evans physician. Reviewed hx, exam and results as well as vitals. He will accept patient in transfer. We do not have thoracic or trauma surgery available. Pt and family agreeable to this transfer and plan. Clint Harrington MD     5:06 PM  I have spent 30 minutes of critical care time involved in lab review, consultations with specialist, family decision-making, and documentation. During this entire length of time I was immediately available to the patient. Critical Care: The reason for providing this level of medical care for this critically ill patient was due a critical illness that impaired one or more vital organ systems such that there was a high probability of imminent or life threatening deterioration in the patients condition. This care involved high complexity decision making to assess, manipulate, and support vital system functions, to treat this degreee vital organ system failure and to prevent further life threatening deterioration of the patients condition.         Recent Results (from the past 24 hour(s))   EKG, 12 LEAD, INITIAL Collection Time: 05/16/22  3:11 PM   Result Value Ref Range    Ventricular Rate 119 BPM    Atrial Rate 119 BPM    P-R Interval 130 ms    QRS Duration 86 ms    Q-T Interval 368 ms    QTC Calculation (Bezet) 517 ms    Calculated P Axis -2 degrees    Calculated R Axis 67 degrees    Calculated T Axis 63 degrees    Diagnosis       Sinus tachycardia  Nonspecific ST and T wave abnormality  Abnormal ECG  When compared with ECG of 08-MAY-2022 22:12,  Nonspecific T wave abnormality now evident in Inferior leads  Nonspecific T wave abnormality, worse in Lateral leads     CBC WITH AUTOMATED DIFF    Collection Time: 05/16/22  3:16 PM   Result Value Ref Range    WBC 18.3 (H) 4.1 - 11.1 K/uL    RBC 1.70 (L) 4.10 - 5.70 M/uL    HGB 7.0 (L) 12.1 - 17.0 g/dL    HCT 20.7 (L) 36.6 - 50.3 %    .8 (H) 80.0 - 99.0 FL    MCH 41.2 (H) 26.0 - 34.0 PG    MCHC 33.8 30.0 - 36.5 g/dL    RDW 17.4 (H) 11.5 - 14.5 %    PLATELET 77 (L) 502 - 400 K/uL    MPV 10.2 8.9 - 12.9 FL    NRBC 0.2 (H) 0  WBC    ABSOLUTE NRBC 0.03 (H) 0.00 - 0.01 K/uL    NEUTROPHILS 84 (H) 32 - 75 %    LYMPHOCYTES 4 (L) 12 - 49 %    MONOCYTES 9 5 - 13 %    EOSINOPHILS 0 0 - 7 %    BASOPHILS 0 0 - 1 %    IMMATURE GRANULOCYTES 3 (H) 0.0 - 0.5 %    ABS. NEUTROPHILS 15.5 (H) 1.8 - 8.0 K/UL    ABS. LYMPHOCYTES 0.7 (L) 0.8 - 3.5 K/UL    ABS. MONOCYTES 1.6 (H) 0.0 - 1.0 K/UL    ABS. EOSINOPHILS 0.0 0.0 - 0.4 K/UL    ABS. BASOPHILS 0.0 0.0 - 0.1 K/UL    ABS. IMM.  GRANS. 0.5 (H) 0.00 - 0.04 K/UL    DF SMEAR SCANNED      RBC COMMENTS JOSESITO CELLS  PRESENT        RBC COMMENTS ANISOCYTOSIS  1+        RBC COMMENTS POLYCHROMASIA  PRESENT        RBC COMMENTS MACROCYTOSIS  2+        RBC COMMENTS SPHEROCYTES  PRESENT       METABOLIC PANEL, COMPREHENSIVE    Collection Time: 05/16/22  3:16 PM   Result Value Ref Range    Sodium 132 (L) 136 - 145 mmol/L    Potassium 3.7 3.5 - 5.1 mmol/L    Chloride 102 97 - 108 mmol/L    CO2 18 (L) 21 - 32 mmol/L    Anion gap 12 5 - 15 mmol/L Glucose 193 (H) 65 - 100 mg/dL    BUN 36 (H) 6 - 20 MG/DL    Creatinine 1.45 (H) 0.70 - 1.30 MG/DL    BUN/Creatinine ratio 25 (H) 12 - 20      GFR est AA >60 >60 ml/min/1.73m2    GFR est non-AA 50 (L) >60 ml/min/1.73m2    Calcium 8.4 (L) 8.5 - 10.1 MG/DL    Bilirubin, total 13.9 (H) 0.2 - 1.0 MG/DL    ALT (SGPT) 76 12 - 78 U/L    AST (SGOT) 62 (H) 15 - 37 U/L    Alk. phosphatase 251 (H) 45 - 117 U/L    Protein, total 5.3 (L) 6.4 - 8.2 g/dL    Albumin 2.0 (L) 3.5 - 5.0 g/dL    Globulin 3.3 2.0 - 4.0 g/dL    A-G Ratio 0.6 (L) 1.1 - 2.2     TROPONIN-HIGH SENSITIVITY    Collection Time: 05/16/22  3:16 PM   Result Value Ref Range    Troponin-High Sensitivity 24 0 - 76 ng/L   SAMPLES BEING HELD    Collection Time: 05/16/22  3:16 PM   Result Value Ref Range    SAMPLES BEING HELD 1BLU     COMMENT        Add-on orders for these samples will be processed based on acceptable specimen integrity and analyte stability, which may vary by analyte. XR CHEST PA LAT    Result Date: 5/16/2022  INDICATION:  sob COMPARISON: May 8 FINDINGS: PA and lateral views of the chest demonstrate interval placement of a right upper extremity PICC line, tip overlying the distal SVC. There is interval development of a moderate right pneumothorax. There is evidence of chronic lung disease. There is no midline shift. There is also a small moderate right pleural effusion and a small left pleural effusion. 1. Interval development of a moderate right pneumothorax. 2. Bilateral pleural effusions, right greater than left. 3. Evidence of chronic lung disease. The findings were called to Dr. Dorita Larsen on 5/16/2022 at 3:50 PM by Dr. Adonis Tanner.   1

## 2022-05-17 NOTE — TELEPHONE ENCOUNTER
Received VM from [facility], pts sister, letting us know pt is in the ICU at [facility] with a chest tube. He was transferred from [facility] due to 3-4 broken ribs and a punctured lung. Returned call to sister. I asked her to keep me updated and told her we'd leave the follow up with Dr. Lenard Barragan next week as scheduled.

## 2022-05-23 NOTE — TELEPHONE ENCOUNTER
Pts sister, [facility], called about appointment with Dr. Marquis Crockett this week. Pt is still in ICU at [facility], but may be moved to step-down today. She said he still has chest tube. Has not been on oxygen since last night. I told sister we'd cancel the appointment with Dr. Marquis Crockett. I asked her to call me when pt is discharged from the hospital and we'll schedule follow up.

## 2022-05-27 NOTE — TELEPHONE ENCOUNTER
Patient's representative called to notify Shani of an update regarding discharge. Patient states that he will get discharged sometime in the next few days. Requests a call back to discuss rehab as next steps for patient.

## 2022-05-31 NOTE — TELEPHONE ENCOUNTER
Return call placed to sister. She's unsure of when pt will be medically ready to discharge. Plan is for him to go to IP rehab facility. I asked sister to let me know when pt has been discharged to rehab. Timing of follow up with Dr. Lydia GARCIA.

## 2022-06-02 NOTE — TELEPHONE ENCOUNTER
Pts sister, Ruthie Chawla, called and said pt was improving, but now he's jaundiced and having AMS. [facility] team has asked about testing he's completed for transplant evaluation and wants records. She said Hepatology is supposed to see him tomorrow, not sure what the plan is. Sister would like for pt to continue seeing Dr. Tiana Lowery and doesn't know what to do. I told her if the [facility] team feels he needs emergent IP liver transplant evaluation, he should follow their recommendation. I explained that although our plan was to refer pt to [facility] for liver transplant, we want to do what's best for the pt. Sister will keep me updated.      Sent pre-liver transplant testing records -   To: Dr. Alejandro Caldwell  Fax: [phone]

## 2022-06-06 ENCOUNTER — TELEPHONE (OUTPATIENT)
Dept: HEMATOLOGY | Age: 57
End: 2022-06-06

## 2022-06-06 NOTE — TELEPHONE ENCOUNTER
Pts sister, [facility], left a VM letting us know pt passed at [facility] on 6/4/2022. Verified date of death through [facility] records. Return call placed to sister. I thanked her for calling and offered our condolences.

## 2025-04-09 NOTE — PROGRESS NOTES
Home IV Antibiotic Orders     1. Diagnosis:  Klebsiella pneumoniae bacteremia and UTI    2. Routine PICC care per protocol    3. Antibiotic:   IV rocephin 2 gm every 24 hours    4. Lab each Monday & Thursdays             CBC/diff/platelets             BMP             CRP (every Mondays)     5. Fax lab to Dr. Maulik Walters @ [phone]. 6.  Call Dr. Maulik Walters @ [phone] for WBC <4, PLT <100, and/or Creat > 1.5    7. Duration of therapy: 5/22/2022       Please remove PICC line upon completion of last dose    8. Antibiotic Allergies: PCN    9.   Case management has been instructed to Call [phone] with name of [id number], NP [Symptom and Test Evaluation] : symptom and test evaluation [Arrhythmia/ECG Abnorrmalities] : arrhythmia/ECG abnormalities

## (undated) DEVICE — 1200 GUARD II KIT W/5MM TUBE W/O VAC TUBE: Brand: GUARDIAN

## (undated) DEVICE — CONTAINER,SPEC,PNEUM TUBE,3OZ,STRL PATH: Brand: MEDLINE

## (undated) DEVICE — CONTINU-FLO SOLUTION SET, 2 INJECTION SITES, MALE LUER LOCK ADAPTER WITH RETRACTABLE COLLAR, LARGE BORE STOPCOCK WITH ROTATING MALE LUER LOCK EXTENSION SET, 2 INJECTION SITES, MALE LUER LOCK ADAPTER WITH RETRACTABLE COLLAR: Brand: INTERLINK/CONTINU-FLO

## (undated) DEVICE — PACK,EENT,TURBAN DRAPE,PK II: Brand: MEDLINE

## (undated) DEVICE — SYR 10ML CTRL LR LCK NSAF LF --

## (undated) DEVICE — SOLUTION LACTATED RINGERS INJECTION USP

## (undated) DEVICE — GAUZE,SPONGE,2"X2",8PLY,STERILE,LF,2'S: Brand: MEDLINE

## (undated) DEVICE — SOLUTION IV 500ML 0.9% SOD CHL FLX CONT

## (undated) DEVICE — SUT ETHLN 4-0 18IN PS2 BLK --

## (undated) DEVICE — NDL CTR 40/70 FM BLCK MAG: Brand: CARDINAL HEALTH

## (undated) DEVICE — BLADE 1882040 5PK INFERIOR TURB 2MM

## (undated) DEVICE — CODMAN® SURGICAL PATTIES 1/2" X 3" (1.27CM X 7.62CM): Brand: CODMAN®

## (undated) DEVICE — PATIENT TRACKER 9734887XOM NON-INVASIVE

## (undated) DEVICE — NEEDLE HYPO 25GA L1.5IN BVL ORIENTED ECLIPSE

## (undated) DEVICE — INFECTION CONTROL KIT SYS

## (undated) DEVICE — SOLUTION IV 1000ML 0.9% SOD CHL

## (undated) DEVICE — TELFA NON-ADHERENT PADS PREPAK: Brand: TELFA

## (undated) DEVICE — STERILE POLYISOPRENE POWDER-FREE SURGICAL GLOVES: Brand: PROTEXIS

## (undated) DEVICE — SET 2ND L34IN N DEHP THE QUEENS MED CNTR VALUELINK

## (undated) DEVICE — 3M™ TEGADERM™ TRANSPARENT FILM DRESSING FRAME STYLE, 1624W, 2-3/8 IN X 2-3/4 IN (6 CM X 7 CM), 100/CT 4CT/CASE: Brand: 3M™ TEGADERM™

## (undated) DEVICE — TOWEL SURG W17XL27IN STD BLU COT NONFENESTRATED PREWASHED

## (undated) DEVICE — GAUZE SPONGES,8 PLY: Brand: CURITY

## (undated) DEVICE — SPLINT 1524050 5PK PAIR DOYLE II AIRWAY: Brand: DOYLE II ™

## (undated) DEVICE — SKIN MARKER,REGULAR TIP WITH RULER AND LABELS: Brand: DEVON

## (undated) DEVICE — Device

## (undated) DEVICE — AIRLIFE™ CORRUGATED FLEXIBLE POLYETHYLENE AND EVA TUBING FOR AEROSOL AND IPPB USE, SEGMENTED, 6 FEET (1.8 M) LENGTH, 22 MM I.D.: Brand: AIRLIFE™

## (undated) DEVICE — BLADE OPHTH MINI BEAV SHRP --

## (undated) DEVICE — BALLOON SINUPLASTY 6X16 MM SYS RELIEVA SPINPLUS

## (undated) DEVICE — SURGIFOAM SPNG SZ 12-7

## (undated) DEVICE — SYRINGE,EAR/ULCER, 2 OZ, STERILE: Brand: MEDLINE

## (undated) DEVICE — BLADE 1884080EM TRICUT 4MMX13CM M4 ROHS: Brand: FUSION®

## (undated) DEVICE — AIRLIFE™ FACE TENT MASK VINYL: Brand: AIRLIFE™

## (undated) DEVICE — YANKAUER,BULB TIP,W/O VENT,RIGID,STERILE: Brand: MEDLINE

## (undated) DEVICE — INSTRUMENT TRACKER 9733533XOM ENT 1PK

## (undated) DEVICE — KENDALL DL ECG CABLE AND LEAD WIRE SYSTEM, 3-LEAD, SINGLE PATIENT USE: Brand: KENDALL

## (undated) DEVICE — Z DISCONTINUEDSOLUTION PREP 2OZ 10% POVIDONE IOD SCR CAP BTL

## (undated) DEVICE — TUBING, SUCTION, 1/4" X 10', STRAIGHT: Brand: MEDLINE

## (undated) DEVICE — DEVICE INFL PRSS G INDIC DISP (MUST BE PURC IN MULTIPLES OF 5)

## (undated) DEVICE — SUTURE ABSORBABLE MONOFILAMENT 4-0 SC-1 18 IN PLN GUT 1824H

## (undated) DEVICE — SUTURE CHROMIC GUT SZ 4-0 L18IN ABSRB BRN L13MM P-3 3/8 CIR 1654G

## (undated) DEVICE — KIT,ANTI FOG,W/SPONGE & FLUID,SOFT PACK: Brand: MEDLINE

## (undated) DEVICE — X-RAY SPONGES,16 PLY: Brand: DERMACEA

## (undated) DEVICE — SPONGE GZ W4XL4IN COT 12 PLY TYP VII WVN C FLD DSGN

## (undated) DEVICE — KIT ADAP STERILE WATER 1000ML -- AIRLIFE

## (undated) DEVICE — 1010 S-DRAPE TOWEL DRAPE 10/BX: Brand: STERI-DRAPE™

## (undated) DEVICE — SYR LR LCK 1ML GRAD NSAF 30ML --

## (undated) DEVICE — NDL SPNE QNCKE 25GX3.5IN LF --

## (undated) DEVICE — GAUZE SPONGES,12 PLY: Brand: CURITY

## (undated) DEVICE — SYR IRR BLB 2OZ DISP BLU STRL -- CONVERT TO ITEM 357637

## (undated) DEVICE — NASAL EPISTAXIS 8 PACK: Brand: XEROGEL

## (undated) DEVICE — MEDI-VAC NON-CONDUCTIVE SUCTION TUBING: Brand: CARDINAL HEALTH

## (undated) DEVICE — TUBING HYDR IRR --